# Patient Record
Sex: FEMALE | Race: ASIAN | NOT HISPANIC OR LATINO | Employment: FULL TIME | URBAN - METROPOLITAN AREA
[De-identification: names, ages, dates, MRNs, and addresses within clinical notes are randomized per-mention and may not be internally consistent; named-entity substitution may affect disease eponyms.]

---

## 2017-01-11 ENCOUNTER — OFFICE VISIT (OUTPATIENT)
Dept: MEDICAL GROUP | Facility: PHYSICIAN GROUP | Age: 57
End: 2017-01-11
Payer: COMMERCIAL

## 2017-01-11 VITALS
SYSTOLIC BLOOD PRESSURE: 122 MMHG | WEIGHT: 142 LBS | BODY MASS INDEX: 27.88 KG/M2 | RESPIRATION RATE: 12 BRPM | OXYGEN SATURATION: 97 % | HEART RATE: 98 BPM | DIASTOLIC BLOOD PRESSURE: 88 MMHG | HEIGHT: 60 IN | TEMPERATURE: 99.1 F

## 2017-01-11 DIAGNOSIS — H92.02 ACUTE PAIN OF LEFT EAR: ICD-10-CM

## 2017-01-11 DIAGNOSIS — S09.91XA LEFT EAR INJURY, INITIAL ENCOUNTER: Primary | ICD-10-CM

## 2017-01-11 PROCEDURE — 99213 OFFICE O/P EST LOW 20 MIN: CPT | Performed by: NURSE PRACTITIONER

## 2017-01-11 RX ORDER — MECLIZINE HYDROCHLORIDE 25 MG/1
25 TABLET ORAL 3 TIMES DAILY PRN
COMMUNITY
End: 2018-02-07

## 2017-01-11 NOTE — MR AVS SNAPSHOT
Urszula Persaud   2017 2:40 PM   Office Visit   MRN: [id number]    Department:  [facility]   Dept Phone:  [phone]    Description:  Female : 1960   Provider:  CASSIE Newberry           Reason for Visit     Otalgia x2 weeks patient was seen at [facility] and was given meclizine      Allergies as of 2017     No Known Allergies      You were diagnosed with     Left ear injury, initial encounter   [704759]  -  Primary     Acute pain of left ear   [5690792]         Vital Signs     Blood Pressure Pulse Temperature Respirations Height Weight    122/88 mmHg 98 37.3 °C (99.1 °F) 12 1.524 m (5') 64.411 kg (142 lb)    Body Mass Index Oxygen Saturation Smoking Status             27.73 kg/m2 97% Never Smoker          Basic Information     Date Of Birth Sex Race Ethnicity Preferred Language    1960 Female  Non- English      Problem List              ICD-10-CM Priority Class Noted - Resolved    HTN (hypertension) I10   2014 - Present    Foot pain M79.673   2014 - Present    Heel spur M77.30   9/3/2014 - Present    Urinary tract infection symptoms R39.9   2016 - Present    Shoulder blade pain M89.8X1   2016 - Present      Health Maintenance        Date Due Completion Dates    IMM DTaP/Tdap/Td Vaccine (1 - Tdap) 10/1/1980 ---    COLON CANCER SCREENING ANNUAL FIT 2016, 2015, 2014    IMM INFLUENZA (1) 2016 ---    MAMMOGRAM 2017, 8/3/2015, 8/3/2015, 2015, 2014 (Prv Comp)    Override on 2014: Previously completed (RDC)    PAP SMEAR 2018, 2011 (Prv Comp)    Override on 2011: Previously completed            Current Immunizations     No immunizations on file.      Below and/or attached are the medications your provider expects you to take. Review all of your home medications and newly ordered medications with your provider and/or pharmacist. Follow medication instructions as  directed by your provider and/or pharmacist. Please keep your medication list with you and share with your provider. Update the information when medications are discontinued, doses are changed, or new medications (including over-the-counter products) are added; and carry medication information at all times in the event of emergency situations     Allergies:  No Known Allergies          Medications  Valid as of: January 11, 2017 -  4:09 PM    Generic Name Brand Name Tablet Size Instructions for use    AmLODIPine Besylate (Tab) NORVASC 5 MG Take 1 Tab by mouth every day.        Clotrimazole-Betamethasone (Cream) LOTRISONE 1-0.05 % Apply sparingly bid for 7-10 days        Diclofenac Sodium (Gel) Diclofenac Sodium 1 % Apply 4 g to skin as directed 3 times a day as needed.        Meclizine HCl (Tab) ANTIVERT 25 MG Take 25 mg by mouth 3 times a day as needed.        .                 Medicines prescribed today were sent to:     [facility]/PHARMACY #[id number] - [place], [place] - [address]    [address] [place] [address]    Phone: [phone] Fax: [phone]    Open 24 Hours?: No      Medication refill instructions:       If your prescription bottle indicates you have medication refills left, it is not necessary to call your provider’s office. Please contact your pharmacy and they will refill your medication.    If your prescription bottle indicates you do not have any refills left, you may request refills at any time through one of the following ways: The online [employer] system (except Urgent Care), by calling your provider’s office, or by asking your pharmacy to contact your provider’s office with a refill request. Medication refills are processed only during regular business hours and may not be available until the next business day. Your provider may request additional information or to have a follow-up visit with you prior to refilling your medication.   *Please Note: Medication refills are assigned a new Rx number when  refilled electronically. Your pharmacy may indicate that no refills were authorized even though a new prescription for the same medication is available at the pharmacy. Please request the medicine by name with the pharmacy before contacting your provider for a refill.        Referral     A referral request has been sent to our patient care coordination department. Please allow 3-5 business days for us to process this request and contact you either by phone or mail. If you do not hear from us by the 5th business day, please call us at [phone].           ChannelMeter Access Code: [email or web address]  Expires: 2/10/2017  1:27 PM    ChannelMeter  A secure, online tool to manage your health information     [employer]’s ChannelMeter® is a secure, online tool that connects you to your personalized health information from the privacy of your home -- day or night - making it very easy for you to manage your healthcare. Once the activation process is completed, you can even access your medical information using the ChannelMeter nehemiah, which is available for free in the Apple Nehemiah store or Google Play store.     ChannelMeter provides the following levels of access (as shown below):   My Chart Features   [facility]  Specialists [facility]  Urgent  Care Non-[facility]  Primary Care  Doctor   Email your healthcare team securely and privately 24/7 X X X    Manage appointments: schedule your next appointment; view details of past/upcoming appointments X      Request prescription refills. X      View recent personal medical records, including lab and immunizations X X X X   View health record, including health history, allergies, medications X X X X   Read reports about your outpatient visits, procedures, consult and ER notes X X X X   See your discharge summary, which is a recap of your hospital and/or ER visit that includes your diagnosis, lab results, and care plan. X X       How to register for ChannelMeter:  1. Go to   [email or web address].  2. Click on the Sign Up Now box, which takes you to the New Member Sign Up page. You will need to provide the following information:  a. Enter your [employer] Access Code exactly as it appears at the top of this page. (You will not need to use this code after you’ve completed the sign-up process. If you do not sign up before the expiration date, you must request a new code.)   b. Enter your date of birth.   c. Enter your home email address.   d. Click Submit, and follow the next screen’s instructions.  3. Create a Kem[employer]arm ID. This will be your Kem[employer] login ID and cannot be changed, so think of one that is secure and easy to remember.  4. Create a Chef Surfingt password. You can change your password at any time.  5. Enter your Password Reset Question and Answer. This can be used at a later time if you forget your password.   6. Enter your e-mail address. This allows you to receive e-mail notifications when new information is available in Kem[employer].  7. Click Sign Up. You can now view your health information.    For assistance activating your [employer] account, call [phone]
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
## 2017-02-08 ENCOUNTER — OFFICE VISIT (OUTPATIENT)
Dept: MEDICAL GROUP | Facility: PHYSICIAN GROUP | Age: 57
End: 2017-02-08
Payer: COMMERCIAL

## 2017-02-08 VITALS
RESPIRATION RATE: 16 BRPM | OXYGEN SATURATION: 96 % | BODY MASS INDEX: 27.8 KG/M2 | HEIGHT: 60 IN | HEART RATE: 89 BPM | WEIGHT: 141.6 LBS | SYSTOLIC BLOOD PRESSURE: 112 MMHG | TEMPERATURE: 99.3 F | DIASTOLIC BLOOD PRESSURE: 68 MMHG

## 2017-02-08 DIAGNOSIS — L29.9 EAR ITCHING: ICD-10-CM

## 2017-02-08 PROCEDURE — 99213 OFFICE O/P EST LOW 20 MIN: CPT | Performed by: NURSE PRACTITIONER

## 2017-02-08 RX ORDER — TRIAMCINOLONE ACETONIDE 1 MG/G
CREAM TOPICAL
Qty: 1 TUBE | Refills: 1 | Status: SHIPPED | OUTPATIENT
Start: 2017-02-08 | End: 2018-02-07

## 2017-02-09 NOTE — PROGRESS NOTES
Subjective:     Chief Complaint   Patient presents with   • Other     Rt Ear problem        HPI:  Urszula Persaud is a 56 y.o. female here today to discuss the following:    Ear itching  Patient has a rash and itching on her right outer ear for 2 weeks. She has applied rubbing alcohol to relieve itching, but continues to irritate it.           Current medicines (including changes today)  Current Outpatient Prescriptions   Medication Sig Dispense Refill   • triamcinolone acetonide (KENALOG) 0.1 % Cream Apply to affected area twice daily until resolved 1 Tube 1   • meclizine (ANTIVERT) 25 MG Tab Take 25 mg by mouth 3 times a day as needed.     • amlodipine (NORVASC) 5 MG Tab Take 1 Tab by mouth every day. 90 Tab 3   • Diclofenac Sodium (VOLTAREN) 1 % Gel Apply 4 g to skin as directed 3 times a day as needed. 1 Tube 5     No current facility-administered medications for this visit.       She  has no past medical history on file.    ROS   Review of Systems   Constitutional: Negative for fever, chills, weight loss and malaise/fatigue.   HENT: Negative for ear pain, nosebleeds, congestion, sore throat and neck pain.  Positive Rash on right outer ear  Respiratory: Negative for cough, sputum production, shortness of breath and wheezing.    Cardiovascular: Negative for chest pain, palpitations,  and leg swelling.   Gastrointestinal: Negative for heartburn, nausea, vomiting, diarrhea and abdominal pain.   All other systems reviewed and are negative except as in HPI.     Objective:   Physical Exam:  Blood pressure 112/68, pulse 89, temperature 37.4 °C (99.3 °F), resp. rate 16, height 1.524 m (5'), weight 64.229 kg (141 lb 9.6 oz), SpO2 96 %. Body mass index is 27.65 kg/(m^2).   General:  Well nourished, well developed in NAD  Head is grossly normal. Right ear helix erythematous, dry scaly rash, no drainage  Neck: Supple without JVD   Pulmonary:  Normal effort.  Cardiovascular: Regular rate   Extremities: no clubbing, cyanosis,  or edema.   Assessment and Plan:   The following treatment plan was discussed   1. Ear itching  triamcinolone acetonide (KENALOG) 0.1 % Cream       Followup: Return if symptoms worsen or fail to improve, for 2 weeks if not resolved.     Please note that this dictation was created using voice recognition software. I have made every reasonable attempt to correct obvious errors, but I expect that there are errors of grammar and possibly content that I did not discover before finalizing the note.

## 2017-04-24 ENCOUNTER — OFFICE VISIT (OUTPATIENT)
Dept: MEDICAL GROUP | Facility: PHYSICIAN GROUP | Age: 57
End: 2017-04-24
Payer: COMMERCIAL

## 2017-04-24 VITALS
OXYGEN SATURATION: 98 % | BODY MASS INDEX: 28.31 KG/M2 | TEMPERATURE: 99.3 F | HEIGHT: 60 IN | RESPIRATION RATE: 16 BRPM | WEIGHT: 144.2 LBS | DIASTOLIC BLOOD PRESSURE: 80 MMHG | SYSTOLIC BLOOD PRESSURE: 116 MMHG | HEART RATE: 89 BPM

## 2017-04-24 DIAGNOSIS — Z12.39 BREAST CANCER SCREENING: ICD-10-CM

## 2017-04-24 DIAGNOSIS — L50.9 URTICARIA: ICD-10-CM

## 2017-04-24 PROCEDURE — 99214 OFFICE O/P EST MOD 30 MIN: CPT | Performed by: NURSE PRACTITIONER

## 2017-04-24 RX ORDER — TRIAMCINOLONE ACETONIDE 40 MG/ML
40 INJECTION, SUSPENSION INTRA-ARTICULAR; INTRAMUSCULAR ONCE
Status: COMPLETED | OUTPATIENT
Start: 2017-04-24 | End: 2017-04-24

## 2017-04-24 RX ADMIN — TRIAMCINOLONE ACETONIDE 40 MG: 40 INJECTION, SUSPENSION INTRA-ARTICULAR; INTRAMUSCULAR at 11:37

## 2017-04-24 NOTE — ASSESSMENT & PLAN NOTE
Started 1 week ago with redness and itching around her right nipple.  She then had itching all over, but no obvious rash.  She has been using essential oils which have been working very well for her.  Discussed plan.

## 2017-04-24 NOTE — MR AVS SNAPSHOT
Urszula Brittben   2017 11:20 AM   Office Visit   MRN: [id number]    Department:  [facility]   Dept Phone:  [phone]    Description:  Female : 1960   Provider:  CASSIE Echavarria           Reason for Visit     Itchy           Allergies as of 2017     No Known Allergies      You were diagnosed with     Breast cancer screening   [920858]       Urticaria   [[id number]]         Vital Signs     Blood Pressure Pulse Temperature Respirations Height Weight    116/80 mmHg 89 37.4 °C (99.3 °F) 16 1.524 m (5') 65.409 kg (144 lb 3.2 oz)    Body Mass Index Oxygen Saturation Smoking Status             28.16 kg/m2 98% Never Smoker          Basic Information     Date Of Birth Sex Race Ethnicity Preferred Language    1960 Female  Non- English      Your appointments     May 02, 2017 11:50 AM   MA SCRN10 with [facility] MG 1   [facility] BREAST [facility] ([address])    [address]  [address]  [facility] [id number] [phone]           No deodorant, powder, perfume or lotion under the arm or breast area.            May 17, 2017  2:00 PM   ANNUAL EXAM PREVENTATIVE with Makeda Jaquez M.D.   [address] [address] [phone]              Problem List              ICD-10-CM Priority Class Noted - Resolved    HTN (hypertension) I10   2014 - Present    Foot pain M79.673   2014 - Present    Heel spur M77.30   9/3/2014 - Present    Urinary tract infection symptoms R39.9   2016 - Present    Shoulder blade pain M89.8X1   2016 - Present    Ear itching L29.9   2017 - Present    Urticaria L50.9   2017 - Present    Breast cancer screening Z12.39   2017 - Present      Health Maintenance        Date Due Completion Dates    IMM DTaP/Tdap/Td Vaccine (1 - Tdap) 10/1/1980 ---    COLON CANCER SCREENING ANNUAL FIT 2016, 2015, 2014    MAMMOGRAM 2017,  7/31/2015, 5/30/2014 (Prv Comp)    Override on 5/30/2014: Previously completed (RDC)    PAP SMEAR 8/18/2018 8/18/2015, 7/30/2011 (Prv Comp)    Override on 7/30/2011: Previously completed            Current Immunizations     No immunizations on file.      Below and/or attached are the medications your provider expects you to take. Review all of your home medications and newly ordered medications with your provider and/or pharmacist. Follow medication instructions as directed by your provider and/or pharmacist. Please keep your medication list with you and share with your provider. Update the information when medications are discontinued, doses are changed, or new medications (including over-the-counter products) are added; and carry medication information at all times in the event of emergency situations     Allergies:  No Known Allergies          Medications  Valid as of: April 24, 2017 -  1:01 PM    Generic Name Brand Name Tablet Size Instructions for use    AmLODIPine Besylate (Tab) NORVASC 5 MG Take 1 Tab by mouth every day.        Diclofenac Sodium (Gel) Diclofenac Sodium 1 % Apply 4 g to skin as directed 3 times a day as needed.        Meclizine HCl (Tab) ANTIVERT 25 MG Take 25 mg by mouth 3 times a day as needed.        Triamcinolone Acetonide (Cream) KENALOG 0.1 % Apply to affected area twice daily until resolved        .                 Medicines prescribed today were sent to:     [facility]/PHARMACY #[id number] - [place], [place] [address]    [address] [place] [address]    Phone: [phone] Fax: [phone]    Open 24 Hours?: No      Medication refill instructions:       If your prescription bottle indicates you have medication refills left, it is not necessary to call your provider’s office. Please contact your pharmacy and they will refill your medication.    If your prescription bottle indicates you do not have any refills left, you may request refills at any time through one of the following ways: The  online [email or web address].com system (except Urgent Care), by calling your provider’s office, or by asking your pharmacy to contact your provider’s office with a refill request. Medication refills are processed only during regular business hours and may not be available until the next business day. Your provider may request additional information or to have a follow-up visit with you prior to refilling your medication.   *Please Note: Medication refills are assigned a new Rx number when refilled electronically. Your pharmacy may indicate that no refills were authorized even though a new prescription for the same medication is available at the pharmacy. Please request the medicine by name with the pharmacy before contacting your provider for a refill.        Your To Do List     Future Labs/Procedures Complete By Expires    MA-SCREEN MAMMO W/CAD-BILAT  As directed 4/24/2018         [email or web address] Access Code: [email or web address]  Expires: 5/24/2017  1:01 PM    Matrimony.com  A secure, online tool to manage your health information     [employer]’s Matrimony.com® is a secure, online tool that connects you to your personalized health information from the privacy of your home -- day or night - making it very easy for you to manage your healthcare. Once the activation process is completed, you can even access your medical information using the Matrimony.com nehemiah, which is available for free in the Apple Nehemiah store or Google Play store.     Matrimony.com provides the following levels of access (as shown below):   My Chart Features   [facility]  Specialists [facility]  Urgent  Care [facility]  Primary Care  Doctor   Email your healthcare team securely and privately 24/7 X X X    Manage appointments: schedule your next appointment; view details of past/upcoming appointments X      Request prescription refills. X      View recent personal medical records, including lab and immunizations X X X X   View health record, including health history, allergies,  medications X X X X   Read reports about your outpatient visits, procedures, consult and ER notes X X X X   See your discharge summary, which is a recap of your hospital and/or ER visit that includes your diagnosis, lab results, and care plan. X X       How to register for [employer]:  1. Go to  [email or web address].  2. Click on the Sign Up Now box, which takes you to the New Member Sign Up page. You will need to provide the following information:  a. Enter your Britestream [employer] Access Code exactly as it appears at the top of this page. (You will not need to use this code after you’ve completed the sign-up process. If you do not sign up before the expiration date, you must request a new code.)   b. Enter your date of birth.   c. Enter your home email address.   d. Click Submit, and follow the next screen’s instructions.  3. Create a Britestream Networks ID. This will be your Brit[employer] Networks login ID and cannot be changed, so think of one that is secure and easy to remember.  4. Create a [employer] password. You can change your password at any time.  5. Enter your Password Reset Question and Answer. This can be used at a later time if you forget your password.   6. Enter your e-mail address. This allows you to receive e-mail notifications when new information is available in Brit[employer]ream Networks.  7. Click Sign Up. You can now view your health information.    For assistance activating your Brit[employer]ream Networks account, call [phone]

## 2017-04-24 NOTE — PROGRESS NOTES
Chief Complaint   Patient presents with   • Itchy       HISTORY OF PRESENT ILLNESS: Patient is a 56 y.o. female established patient who presents today to discuss the following issues:    Urticaria  Started 1 week ago with redness and itching around her right nipple.  She then had itching all over, but no obvious rash.  She has been using essential oils which have been working very well for her.  Discussed plan.    Breast cancer screening  Is due for screening mammogram.      Patient Active Problem List    Diagnosis Date Noted   • Urticaria 2017   • Breast cancer screening 2017   • Ear itching 2017   • Urinary tract infection symptoms 2016   • Shoulder blade pain 2016   • Heel spur 2014   • HTN (hypertension) 2014   • Foot pain 2014       Allergies:Review of patient's allergies indicates no known allergies.    Current Outpatient Prescriptions   Medication Sig Dispense Refill   • triamcinolone acetonide (KENALOG) 0.1 % Cream Apply to affected area twice daily until resolved 1 Tube 1   • meclizine (ANTIVERT) 25 MG Tab Take 25 mg by mouth 3 times a day as needed.     • amlodipine (NORVASC) 5 MG Tab Take 1 Tab by mouth every day. 90 Tab 3   • Diclofenac Sodium (VOLTAREN) 1 % Gel Apply 4 g to skin as directed 3 times a day as needed. 1 Tube 5     Current Facility-Administered Medications   Medication Dose Route Frequency Provider Last Rate Last Dose   • triamcinolone acetonide (KENALOG-40) injection 40 mg  40 mg Intramuscular Once Gigi Lutz, A.P.N.           Social History   Substance Use Topics   • Smoking status: Never Smoker    • Smokeless tobacco: Never Used   • Alcohol Use: No       Family Status   Relation Status Death Age   • Mother     • Father       Family History   Problem Relation Age of Onset   • Diabetes Neg Hx    • Cancer Neg Hx        Review of Systems:   Constitutional: Negative for fever, chills, weight loss and malaise/fatigue.   HENT:  Negative for ear pain, nosebleeds, congestion, sore throat and neck pain.    Eyes: Negative for blurred vision.   Respiratory: Negative for cough, sputum production, shortness of breath and wheezing.    Cardiovascular: Negative for chest pain, palpitations, orthopnea and leg swelling.   Gastrointestinal: Negative for heartburn, nausea, vomiting and abdominal pain.   Genitourinary: Negative for dysuria, urgency and frequency.   Musculoskeletal: Negative for myalgias, joint pain, and back pain.  Skin: Positive for itching all over and redness and itching on right breast.  Neurological: Negative for dizziness, tingling, tremors, sensory change, focal weakness and headaches.   Endo/Heme/Allergies: Does not bruise/bleed easily.   Psychiatric/Behavioral: Negative for depression, suicidal ideas and memory loss.  The patient is not nervous/anxious and does not have insomnia.    All other systems reviewed and are negative except as in HPI.    Exam:  Blood pressure 116/80, pulse 89, temperature 37.4 °C (99.3 °F), resp. rate 16, height 1.524 m (5'), weight 65.409 kg (144 lb 3.2 oz), SpO2 98 %.  General:  Well nourished, well developed female in NAD  Head: Grossly normal.  Neck: Supple without JVD or bruit. Thyroid is not enlarged.  Pulmonary: Clear to ausculation. Normal effort. No rales, ronchi, or wheezing.  Cardiovascular: Regular rate and rhythm without murmur.   Extremities: No clubbing, cyanosis, or edema.  Skin: Intact with no obvious rashes.  Positive for erythema and mild excoriation around right nipple.  Neuro: Grossly intact.  Psych: Alert and oriented x 3.  Mood and affect appropriate.    Medical decision-making and discussion: Urszula is here today to discuss itching.  She was given a Kenalog shot, and she was encouraged to use otc meds such as benadryl and hydrocortisone cream along with her essential oils.  A screening mammogram was also ordered.  She was encouraged to follow-up if she is wprse/no  better.          Assessment/Plan:  1. Breast cancer screening  MA-SCREEN MAMMO W/CAD-BILAT   2. Urticaria  triamcinolone acetonide (KENALOG-40) injection 40 mg       Return if symptoms worsen or fail to improve.    Please note that this dictation was created using voice recognition software. I have made every reasonable attempt to correct obvious errors, but I expect that there are errors of grammar and possibly content that I did not discover before finalizing the note.

## 2017-05-02 ENCOUNTER — HOSPITAL ENCOUNTER (OUTPATIENT)
Dept: RADIOLOGY | Facility: MEDICAL CENTER | Age: 57
End: 2017-05-02
Attending: NURSE PRACTITIONER
Payer: COMMERCIAL

## 2017-05-02 DIAGNOSIS — Z12.39 BREAST CANCER SCREENING: ICD-10-CM

## 2017-05-02 PROCEDURE — G0202 SCR MAMMO BI INCL CAD: HCPCS

## 2017-05-03 ENCOUNTER — TELEPHONE (OUTPATIENT)
Dept: MEDICAL GROUP | Facility: PHYSICIAN GROUP | Age: 57
End: 2017-05-03

## 2017-05-03 NOTE — TELEPHONE ENCOUNTER
----- Message from CASSIE Echavarria sent at 5/2/2017  6:12 PM PDT -----  Please call patient and let her know that her mammogram was normal.  She should plan to have another one in about a year.     Thank you!  Gigi

## 2017-05-31 ENCOUNTER — OFFICE VISIT (OUTPATIENT)
Dept: MEDICAL GROUP | Facility: PHYSICIAN GROUP | Age: 57
End: 2017-05-31
Payer: COMMERCIAL

## 2017-05-31 VITALS
DIASTOLIC BLOOD PRESSURE: 90 MMHG | RESPIRATION RATE: 20 BRPM | TEMPERATURE: 99.5 F | SYSTOLIC BLOOD PRESSURE: 140 MMHG | BODY MASS INDEX: 29.06 KG/M2 | HEIGHT: 60 IN | HEART RATE: 100 BPM | WEIGHT: 148 LBS | OXYGEN SATURATION: 98 %

## 2017-05-31 DIAGNOSIS — Z00.00 WELL ADULT EXAM: ICD-10-CM

## 2017-05-31 DIAGNOSIS — I10 ESSENTIAL HYPERTENSION: ICD-10-CM

## 2017-05-31 DIAGNOSIS — Z13.29 SCREENING FOR ENDOCRINE DISORDER: ICD-10-CM

## 2017-05-31 DIAGNOSIS — Z12.11 SCREENING FOR COLON CANCER: ICD-10-CM

## 2017-05-31 DIAGNOSIS — N76.0 VULVOVAGINITIS: ICD-10-CM

## 2017-05-31 DIAGNOSIS — Z13.6 SCREENING FOR CARDIOVASCULAR CONDITION: ICD-10-CM

## 2017-05-31 PROBLEM — L50.9 URTICARIA: Status: RESOLVED | Noted: 2017-04-24 | Resolved: 2017-05-31

## 2017-05-31 PROBLEM — L29.9 EAR ITCHING: Status: RESOLVED | Noted: 2017-02-08 | Resolved: 2017-05-31

## 2017-05-31 PROCEDURE — 99396 PREV VISIT EST AGE 40-64: CPT | Performed by: FAMILY MEDICINE

## 2017-05-31 RX ORDER — AMLODIPINE BESYLATE 5 MG/1
5 TABLET ORAL DAILY
Qty: 90 TAB | Refills: 3 | Status: SHIPPED | OUTPATIENT
Start: 2017-05-31 | End: 2017-05-31 | Stop reason: SDUPTHER

## 2017-05-31 RX ORDER — AMLODIPINE BESYLATE 5 MG/1
5 TABLET ORAL DAILY
Qty: 90 TAB | Refills: 3 | Status: SHIPPED | OUTPATIENT
Start: 2017-05-31 | End: 2018-02-01 | Stop reason: SDUPTHER

## 2017-05-31 NOTE — PROGRESS NOTES
Chief Complaint   Patient presents with   • Annual Exam       HISTORY OF PRESENT ILLNESS: Patient is a 56 y.o. Female est  patient who presents today to discuss the following issues:    1. Well adult exam  This is a post-menopausal 56 year old female here today for annual preventive visit.  Due for labs.  Mammo is up to date.  Due for colon cancer screening, declines colonoscopy.     2. Screening for colon cancer  3. Screening for cardiovascular condition  4. Screening for endocrine disorder  5. Vulvovaginitis  Reports that she has been having vaginal itching. Tried some hydrocortisone.      6. Essential hypertension  Continues on the amlodipine, but needs refill.  No CP, SOB, leg swelling.       Active Ambulatory Problems     Diagnosis Date Noted   • HTN (hypertension) 07/30/2014   • Foot pain 07/30/2014   • Heel spur 09/03/2014   • Breast cancer screening 04/24/2017     Resolved Ambulatory Problems     Diagnosis Date Noted   • Urinary tract infection symptoms 09/14/2016   • Shoulder blade pain 09/14/2016   • Ear itching 02/08/2017   • Urticaria 04/24/2017     No Additional Past Medical History       Allergies:Review of patient's allergies indicates no known allergies.    Current Outpatient Prescriptions   Medication Sig Dispense Refill   • nystatin/triamcinolone (MYCOLOG) 085896-2.1 UNIT/GM-% Cream Apply nightly for 3 nights. 30 g 0   • amlodipine (NORVASC) 5 MG Tab Take 1 Tab by mouth every day. 90 Tab 3   • triamcinolone acetonide (KENALOG) 0.1 % Cream Apply to affected area twice daily until resolved 1 Tube 1   • Diclofenac Sodium (VOLTAREN) 1 % Gel Apply 4 g to skin as directed 3 times a day as needed. 1 Tube 5   • meclizine (ANTIVERT) 25 MG Tab Take 25 mg by mouth 3 times a day as needed.       No current facility-administered medications for this visit.       Social History   Substance Use Topics   • Smoking status: Never Smoker    • Smokeless tobacco: Never Used   • Alcohol Use: No       Family Status    Relation Status Death Age   • Mother     • Father       Family History   Problem Relation Age of Onset   • Diabetes Neg Hx    • Cancer Neg Hx      Health Maintenance Due   Topic Date Due   • IMM DTaP/Tdap/Td Vaccine (1 - Tdap) 10/01/1980   • COLON CANCER SCREENING ANNUAL FIT  2016       ROS:  Review of Systems   Constitutional: Negative for fever, chills, weight loss and malaise/fatigue.   HENT: Negative for ear pain, nosebleeds, congestion, sore throat and neck pain.    Eyes: Negative for blurred vision.   Respiratory: Negative for cough, sputum production, shortness of breath and wheezing.    Cardiovascular: Negative for chest pain, palpitations, orthopnea and leg swelling.   Gastrointestinal: Negative for heartburn, nausea, vomiting and abdominal pain.   Genitourinary: Negative for dysuria, urgency and frequency.   Musculoskeletal: Negative for myalgias, back pain and joint pain.   Skin: Negative for rash and itching.   Neurological: Negative for dizziness, tingling, tremors, sensory change, focal weakness and headaches.   Endo/Heme/Allergies: Does not bruise/bleed easily.   Psychiatric/Behavioral: Negative for depression, suicidal ideas and memory loss.  The patient is not nervous/anxious and does not have insomnia.      Exam:  Blood pressure 140/90, pulse 100, temperature 37.5 °C (99.5 °F), resp. rate 20, height 1.524 m (5'), weight 67.132 kg (148 lb), SpO2 98 %.  General:  Well nourished, well developed female in NAD  HEENT: Normocephalic and atraumatic. TMs clear bilaterally. Oropharynx is clear      without erythema and no tonsillar exudate. Nasal mucosa pink with minimal      Rhinorrhea.  EYES: EOMI.  Conjunctiva clear.    Neck: Supple without JVD or bruit. Thyroid is not enlarged.  Pulmonary: Clear to ausculation and percussion.  Normal effort. No rales, ronchi, or wheezing.  Cardiovascular: Regular rate and rhythm without murmur, no peripheral edema  Abdomen: positive bowel sounds.   Non tender, non distended, no hepatosplenomegaly.   MSK: normal ROM in upper and lower extremities bilaterally  Psych: appropriate affect and concentration, oriented to person and place  Neuro: no unilateral weakness in upper or lower extremities.  No gait disturbance    Please note that this dictation was created using voice recognition software. I have made every reasonable attempt to correct obvious errors, but I expect that there are errors of grammar and possibly content that I did not discover before finalizing the note.    Assessment/Plan:  1. Well adult exam  Continue healthy lifestyle.     2. Screening for colon cancer    - OCCULT BLOOD, FECAL IMMUNOASSAY    3. Screening for cardiovascular condition    - LIPID PROFILE; Future  - COMP METABOLIC PANEL; Future    4. Screening for endocrine disorder    - COMP METABOLIC PANEL; Future  - VITAMIN D,25 HYDROXY; Future    5. Vulvovaginitis    - nystatin/triamcinolone (MYCOLOG) 514118-7.1 UNIT/GM-% Cream; Apply nightly for 3 nights.  Dispense: 30 g; Refill: 0    6. Essential hypertension    - amlodipine (NORVASC) 5 MG Tab; Take 1 Tab by mouth every day.  Dispense: 90 Tab; Refill: 3      Annual follow up

## 2017-05-31 NOTE — MR AVS SNAPSHOT
Urszula Persaud   2017 11:20 AM   Office Visit   MRN: [id number]    Department:  [facility]   Dept Phone:  [phone]    Description:  Female : 1960   Provider:  Makeda Jaquez M.D.           Reason for Visit     Annual Exam           Allergies as of 2017     No Known Allergies      You were diagnosed with     Well adult exam   [328489]       Screening for colon cancer   [695535]       Screening for cardiovascular condition   [284833]       Screening for endocrine disorder   [6439650]       Vulvovaginitis   [074089]       Essential hypertension   [7811986]         Vital Signs     Blood Pressure Pulse Temperature Respirations Height Weight    140/90 mmHg 100 37.5 °C (99.5 °F) 20 1.524 m (5') 67.132 kg (148 lb)    Body Mass Index Oxygen Saturation Smoking Status             28.90 kg/m2 98% Never Smoker          Basic Information     Date Of Birth Sex Race Ethnicity Preferred Language    1960 Female  Non- English      Problem List              ICD-10-CM Priority Class Noted - Resolved    HTN (hypertension) I10   2014 - Present    Foot pain M79.673   2014 - Present    Heel spur M77.30   9/3/2014 - Present    Breast cancer screening Z12.39   2017 - Present      Health Maintenance        Date Due Completion Dates    IMM DTaP/Tdap/Td Vaccine (1 - Tdap) 10/1/1980 ---    COLON CANCER SCREENING ANNUAL FIT 2016, 2015, 2014    MAMMOGRAM 2018, 2016, 2015, 2014 (Prv Comp)    Override on 2014: Previously completed (RDC)    PAP SMEAR 2018, 2011 (Prv Comp)    Override on 2011: Previously completed            Current Immunizations     No immunizations on file.      Below and/or attached are the medications your provider expects you to take. Review all of your home medications and newly ordered medications with your provider and/or pharmacist. Follow medication instructions as directed  by your provider and/or pharmacist. Please keep your medication list with you and share with your provider. Update the information when medications are discontinued, doses are changed, or new medications (including over-the-counter products) are added; and carry medication information at all times in the event of emergency situations     Allergies:  No Known Allergies          Medications  Valid as of: May 31, 2017 -  3:24 PM    Generic Name Brand Name Tablet Size Instructions for use    AmLODIPine Besylate (Tab) NORVASC 5 MG Take 1 Tab by mouth every day.        Diclofenac Sodium (Gel) Diclofenac Sodium 1 % Apply 4 g to skin as directed 3 times a day as needed.        Meclizine HCl (Tab) ANTIVERT 25 MG Take 25 mg by mouth 3 times a day as needed.        Nystatin-Triamcinolone (Cream) MYCOLOG 973354-0.1 UNIT/GM-% Apply nightly for 3 nights.        Triamcinolone Acetonide (Cream) KENALOG 0.1 % Apply to affected area twice daily until resolved        .                 Medicines prescribed today were sent to:     [facility][id number] - [place] - [address] [facility].    [address] [place] [address]    Phone: [phone] Fax: [phone]    Open 24 Hours?: No    [id number]  [place] - [address] [facility] [address]    [address] [facility] [address]    Phone: [phone] Fax: [phone]    Open 24 Hours?: No      Medication refill instructions:       If your prescription bottle indicates you have medication refills left, it is not necessary to call your provider’s office. Please contact your pharmacy and they will refill your medication.    If your prescription bottle indicates you do not have any refills left, you may request refills at any time through one of the following ways: The online [employer] system (except Urgent Care), by calling your provider’s office, or by asking your pharmacy to contact your provider’s office with a refill request. Medication refills are processed  only during regular business hours and may not be available until the next business day. Your provider may request additional information or to have a follow-up visit with you prior to refilling your medication.   *Please Note: Medication refills are assigned a new Rx number when refilled electronically. Your pharmacy may indicate that no refills were authorized even though a new prescription for the same medication is available at the pharmacy. Please request the medicine by name with the pharmacy before contacting your provider for a refill.        Your To Do List     Future Labs/Procedures Complete By Expires    COMP METABOLIC PANEL  As directed 5/31/2018    LIPID PROFILE  As directed 5/31/2018    VITAMIN D,25 HYDROXY  As directed 5/31/2018      Referral     A referral request has been sent to our patient care coordination department. Please allow 3-5 business days for us to process this request and contact you either by phone or mail. If you do not hear from us by the 5th business day, please call us at [phone].           Fed[employer]ber Access Code: [email or web address]  Expires: 6/30/2017 11:11 AM    FedCyber  A secure, online tool to manage your health information     [employer]’s FedCyber® is a secure, online tool that connects you to your personalized health information from the privacy of your home -- day or night - making it very easy for you to manage your healthcare. Once the activation process is completed, you can even access your medical information using the FedCyber nehemiah, which is available for free in the [employer] Nehemiah store or Google Play store.     FedCyber provides the following levels of access (as shown below):   My Chart Features   [facility] Doctor [facility]  Specialists [facility]  Urgent  Care Non-[facility]  Primary Care  Doctor   Email your healthcare team securely and privately 24/7 X X X    Manage appointments: schedule your next appointment; view details of past/upcoming appointments X       Request prescription refills. X      View recent personal medical records, including lab and immunizations X X X X   View health record, including health history, allergies, medications X X X X   Read reports about your outpatient visits, procedures, consult and ER notes X X X X   See your discharge summary, which is a recap of your hospital and/or ER visit that includes your diagnosis, lab results, and care plan. X X       How to register for Seeq:  1. Go to  [email or web address].  2. Click on the Sign Up Now box, which takes you to the New Member Sign Up page. You will need to provide the following information:  a. Enter your Seeq Access Code exactly as it appears at the top of this page. (You will not need to use this code after you’ve completed the sign-up process. If you do not sign up before the expiration date, you must request a new code.)   b. Enter your date of birth.   c. Enter your home email address.   d. Click Submit, and follow the next screen’s instructions.  3. Create a Seeq ID. This will be your Seeq login ID and cannot be changed, so think of one that is secure and easy to remember.  4. Create a Seeq password. You can change your password at any time.  5. Enter your Password Reset Question and Answer. This can be used at a later time if you forget your password.   6. Enter your e-mail address. This allows you to receive e-mail notifications when new information is available in Seeq.  7. Click Sign Up. You can now view your health information.    For assistance activating your Seeq account, call [phone]

## 2017-05-31 NOTE — TELEPHONE ENCOUNTER
Was the patient seen in the last year in this department? Yes  today    Does patient have an active prescription for medications requested? Yes pharmacy change    Received Request Via: Patient

## 2017-05-31 NOTE — TELEPHONE ENCOUNTER
Patient requests change in pharmacy.    [id number] - [place], [place] - [address] [address]  Phone: [phone] Fax: [phone]

## 2017-06-01 ENCOUNTER — HOSPITAL ENCOUNTER (OUTPATIENT)
Dept: LAB | Facility: MEDICAL CENTER | Age: 57
End: 2017-06-01
Attending: FAMILY MEDICINE
Payer: COMMERCIAL

## 2017-06-01 ENCOUNTER — HOSPITAL ENCOUNTER (OUTPATIENT)
Facility: MEDICAL CENTER | Age: 57
End: 2017-06-01
Attending: FAMILY MEDICINE
Payer: COMMERCIAL

## 2017-06-01 DIAGNOSIS — Z13.29 SCREENING FOR ENDOCRINE DISORDER: ICD-10-CM

## 2017-06-01 DIAGNOSIS — Z13.6 SCREENING FOR CARDIOVASCULAR CONDITION: ICD-10-CM

## 2017-06-01 LAB
25(OH)D3 SERPL-MCNC: 21 NG/ML (ref 30–100)
ALBUMIN SERPL BCP-MCNC: 4.6 G/DL (ref 3.2–4.9)
ALBUMIN/GLOB SERPL: 1.6 G/DL
ALP SERPL-CCNC: 71 U/L (ref 30–99)
ALT SERPL-CCNC: 20 U/L (ref 2–50)
ANION GAP SERPL CALC-SCNC: 7 MMOL/L (ref 0–11.9)
AST SERPL-CCNC: 15 U/L (ref 12–45)
BILIRUB SERPL-MCNC: 1.1 MG/DL (ref 0.1–1.5)
BUN SERPL-MCNC: 15 MG/DL (ref 8–22)
CALCIUM SERPL-MCNC: 9.8 MG/DL (ref 8.5–10.5)
CHLORIDE SERPL-SCNC: 100 MMOL/L (ref 96–112)
CHOLEST SERPL-MCNC: 224 MG/DL (ref 100–199)
CO2 SERPL-SCNC: 29 MMOL/L (ref 20–33)
CREAT SERPL-MCNC: 0.66 MG/DL (ref 0.5–1.4)
GFR SERPL CREATININE-BSD FRML MDRD: >60 ML/MIN/1.73 M 2
GLOBULIN SER CALC-MCNC: 2.9 G/DL (ref 1.9–3.5)
GLUCOSE SERPL-MCNC: 92 MG/DL (ref 65–99)
HDLC SERPL-MCNC: 76 MG/DL
LDLC SERPL CALC-MCNC: 125 MG/DL
POTASSIUM SERPL-SCNC: 3.9 MMOL/L (ref 3.6–5.5)
PROT SERPL-MCNC: 7.5 G/DL (ref 6–8.2)
SODIUM SERPL-SCNC: 136 MMOL/L (ref 135–145)
TRIGL SERPL-MCNC: 114 MG/DL (ref 0–149)

## 2017-06-01 PROCEDURE — 80061 LIPID PANEL: CPT

## 2017-06-01 PROCEDURE — 80053 COMPREHEN METABOLIC PANEL: CPT

## 2017-06-01 PROCEDURE — 82306 VITAMIN D 25 HYDROXY: CPT

## 2017-06-01 PROCEDURE — 82274 ASSAY TEST FOR BLOOD FECAL: CPT

## 2017-06-01 PROCEDURE — 36415 COLL VENOUS BLD VENIPUNCTURE: CPT

## 2017-06-03 LAB — HEMOCCULT STL QL IA: NEGATIVE

## 2017-06-05 ENCOUNTER — TELEPHONE (OUTPATIENT)
Dept: MEDICAL GROUP | Facility: PHYSICIAN GROUP | Age: 57
End: 2017-06-05

## 2017-06-05 NOTE — TELEPHONE ENCOUNTER
----- Message from CASSIE Newberry sent at 6/5/2017  6:47 AM PDT -----  Your FIT test was negative which indicates with 90% accuracy that there is no blood in the colon.  You can put off a colonoscopy for another year in light of these results.

## 2017-06-07 ENCOUNTER — TELEPHONE (OUTPATIENT)
Dept: MEDICAL GROUP | Facility: PHYSICIAN GROUP | Age: 57
End: 2017-06-07

## 2017-06-07 NOTE — TELEPHONE ENCOUNTER
----- Message from Makeda Jaquez M.D. sent at 6/7/2017  7:58 AM PDT -----  Labs are all acceptable except for Vitamin D, which is a little low.  Recommend 5000 IU per day of OTC vitamin D 3.

## 2018-02-01 ENCOUNTER — OFFICE VISIT (OUTPATIENT)
Dept: MEDICAL GROUP | Facility: PHYSICIAN GROUP | Age: 58
End: 2018-02-01
Payer: COMMERCIAL

## 2018-02-01 VITALS
WEIGHT: 144 LBS | HEART RATE: 76 BPM | OXYGEN SATURATION: 96 % | BODY MASS INDEX: 27.19 KG/M2 | RESPIRATION RATE: 16 BRPM | DIASTOLIC BLOOD PRESSURE: 80 MMHG | HEIGHT: 61 IN | SYSTOLIC BLOOD PRESSURE: 132 MMHG | TEMPERATURE: 98.8 F

## 2018-02-01 DIAGNOSIS — I10 ESSENTIAL HYPERTENSION: ICD-10-CM

## 2018-02-01 DIAGNOSIS — Z12.39 SCREENING FOR BREAST CANCER: ICD-10-CM

## 2018-02-01 DIAGNOSIS — M79.671 FOOT PAIN, RIGHT: ICD-10-CM

## 2018-02-01 DIAGNOSIS — Z12.11 SCREENING FOR COLON CANCER: ICD-10-CM

## 2018-02-01 DIAGNOSIS — Z83.3 FAMILY HISTORY OF DIABETES MELLITUS: ICD-10-CM

## 2018-02-01 DIAGNOSIS — Z00.00 WELL ADULT EXAM: ICD-10-CM

## 2018-02-01 DIAGNOSIS — Z13.6 SCREENING FOR CARDIOVASCULAR CONDITION: ICD-10-CM

## 2018-02-01 PROCEDURE — 99396 PREV VISIT EST AGE 40-64: CPT | Performed by: FAMILY MEDICINE

## 2018-02-01 RX ORDER — AMLODIPINE BESYLATE 5 MG/1
5 TABLET ORAL DAILY
Qty: 90 TAB | Refills: 3 | Status: ON HOLD | OUTPATIENT
Start: 2018-02-01 | End: 2018-02-16

## 2018-02-01 ASSESSMENT — PATIENT HEALTH QUESTIONNAIRE - PHQ9: CLINICAL INTERPRETATION OF PHQ2 SCORE: 0

## 2018-02-01 NOTE — PROGRESS NOTES
Chief Complaint   Patient presents with   • Annual Exam       HISTORY OF PRESENT ILLNESS: Patient is a 57 y.o. female new patient who presents today to discuss the following issues:    1. Well adult exam  Here today for annual preventive exam.  She is doing quite well needs refills on her chronic medications.   Due for pap, mammo, colon cancer screening and labs this summer.  No new symptoms        Active Ambulatory Problems     Diagnosis Date Noted   • HTN (hypertension) 2014   • Foot pain 2014   • Heel spur 2014   • Breast cancer screening 2017     Resolved Ambulatory Problems     Diagnosis Date Noted   • Urinary tract infection symptoms 2016   • Shoulder blade pain 2016   • Ear itching 2017   • Urticaria 2017     No Additional Past Medical History       Allergies:Patient has no known allergies.    Current Outpatient Prescriptions   Medication Sig Dispense Refill   • Diclofenac Sodium (VOLTAREN) 1 % Gel Apply 4 g to skin as directed 3 times a day as needed. 1 Tube 5   • amLODIPine (NORVASC) 5 MG Tab Take 1 Tab by mouth every day. 90 Tab 3   • nystatin/triamcinolone (MYCOLOG) 964494-8.1 UNIT/GM-% Cream Apply nightly for 3 nights. 30 g 0   • meclizine (ANTIVERT) 25 MG Tab Take 25 mg by mouth 3 times a day as needed.     • triamcinolone acetonide (KENALOG) 0.1 % Cream Apply to affected area twice daily until resolved 1 Tube 1     No current facility-administered medications for this visit.        Social History   Substance Use Topics   • Smoking status: Never Smoker   • Smokeless tobacco: Never Used   • Alcohol use No       Family Status   Relation Status   • Mother    • Father    • Neg Hx      Family History   Problem Relation Age of Onset   • Diabetes Neg Hx    • Cancer Neg Hx      Health Maintenance Due   Topic Date Due   • IMM DTaP/Tdap/Td Vaccine (1 - Tdap) 10/01/1980       ROS:  Review of Systems   Constitutional: Negative for fever, chills, weight  "loss and malaise/fatigue.   HENT: Negative for ear pain, nosebleeds, congestion, sore throat and neck pain.    Eyes: Negative for blurred vision.   Respiratory: Negative for cough, sputum production, shortness of breath and wheezing.    Cardiovascular: Negative for chest pain, palpitations, orthopnea and leg swelling.   Gastrointestinal: Negative for heartburn, nausea, vomiting and abdominal pain.   Genitourinary: Negative for dysuria, urgency and frequency.   Musculoskeletal: Negative for myalgias, back pain and joint pain.   Skin: Negative for rash and itching.   Neurological: Negative for dizziness, tingling, tremors, sensory change, focal weakness and headaches.   Endo/Heme/Allergies: Does not bruise/bleed easily.   Psychiatric/Behavioral: Negative for depression, suicidal ideas and memory loss.  The patient is not nervous/anxious and does not have insomnia.      Exam:  Blood pressure 132/80, pulse 76, temperature 37.1 °C (98.8 °F), resp. rate 16, height 1.549 m (5' 1\"), weight 65.3 kg (144 lb), SpO2 96 %.  General:  Well nourished, well developed female in NAD  HEENT: Normocephalic and atraumatic. TMs clear bilaterally. Oropharynx is clear      without erythema and no tonsillar exudate. Nasal mucosa pink with minimal      Rhinorrhea.  EYES: EOMI.  Conjunctiva clear.    Neck: Supple without JVD or bruit. Thyroid is not enlarged.  Pulmonary: Clear to ausculation and percussion.  Normal effort. No rales, ronchi, or wheezing.  Cardiovascular: Regular rate and rhythm without murmur, no peripheral edema  Abdomen: positive bowel sounds.  Non tender, non distended, no hepatosplenomegaly.   MSK: normal ROM in upper and lower extremities bilaterally  Psych: appropriate affect and concentration, oriented to person and place  Neuro: no unilateral weakness in upper or lower extremities.  No gait disturbance    Please note that this dictation was created using voice recognition software. I have made every reasonable attempt " to correct obvious errors, but I expect that there are errors of grammar and possibly content that I did not discover before finalizing the note.    Assessment/Plan:  1. Well adult exam  Continue healthy lifestyle, maintain normal weight      2. Foot pain, right  - Diclofenac Sodium (VOLTAREN) 1 % Gel; Apply 4 g to skin as directed 3 times a day as needed.  Dispense: 1 Tube; Refill: 5    3. Essential hypertension  - amLODIPine (NORVASC) 5 MG Tab; Take 1 Tab by mouth every day.  Dispense: 90 Tab; Refill: 3  - COMP METABOLIC PANEL; Future  - LIPID PROFILE; Future    4. Family history of diabetes mellitus  - HEMOGLOBIN A1C; Future    5. Screening for cardiovascular condition  - COMP METABOLIC PANEL; Future  - LIPID PROFILE; Future    6. Screening for breast cancer  - MA-MAMMO SCREENING BILAT W/DARIUS W/O CAD; Future    7. Screening for colon cancer  - OCCULT BLOOD, FECAL IMMUNOASSAY

## 2018-02-06 ENCOUNTER — OFFICE VISIT (OUTPATIENT)
Dept: URGENT CARE | Facility: PHYSICIAN GROUP | Age: 58
End: 2018-02-06
Payer: COMMERCIAL

## 2018-02-06 ENCOUNTER — HOSPITAL ENCOUNTER (OUTPATIENT)
Dept: RADIOLOGY | Facility: MEDICAL CENTER | Age: 58
End: 2018-02-06
Attending: PHYSICIAN ASSISTANT
Payer: COMMERCIAL

## 2018-02-06 VITALS
WEIGHT: 144 LBS | RESPIRATION RATE: 16 BRPM | BODY MASS INDEX: 27.19 KG/M2 | TEMPERATURE: 98.8 F | HEART RATE: 87 BPM | DIASTOLIC BLOOD PRESSURE: 88 MMHG | HEIGHT: 61 IN | OXYGEN SATURATION: 96 % | SYSTOLIC BLOOD PRESSURE: 118 MMHG

## 2018-02-06 DIAGNOSIS — M25.511 ACUTE PAIN OF RIGHT SHOULDER: ICD-10-CM

## 2018-02-06 DIAGNOSIS — M62.838 MUSCLE SPASM: ICD-10-CM

## 2018-02-06 PROCEDURE — 99214 OFFICE O/P EST MOD 30 MIN: CPT | Mod: 25 | Performed by: PHYSICIAN ASSISTANT

## 2018-02-06 PROCEDURE — 73030 X-RAY EXAM OF SHOULDER: CPT | Mod: RT

## 2018-02-06 RX ORDER — METHYLPREDNISOLONE 4 MG/1
TABLET ORAL
Qty: 21 TAB | Refills: 0 | Status: ON HOLD | OUTPATIENT
Start: 2018-02-06 | End: 2018-02-16

## 2018-02-06 RX ORDER — CYCLOBENZAPRINE HCL 10 MG
10 TABLET ORAL 3 TIMES DAILY PRN
Qty: 30 TAB | Refills: 0 | Status: SHIPPED | OUTPATIENT
Start: 2018-02-06 | End: 2018-04-19

## 2018-02-06 RX ORDER — KETOROLAC TROMETHAMINE 30 MG/ML
60 INJECTION, SOLUTION INTRAMUSCULAR; INTRAVENOUS ONCE
Status: COMPLETED | OUTPATIENT
Start: 2018-02-06 | End: 2018-02-06

## 2018-02-06 RX ADMIN — KETOROLAC TROMETHAMINE 60 MG: 30 INJECTION, SOLUTION INTRAMUSCULAR; INTRAVENOUS at 08:41

## 2018-02-06 ASSESSMENT — ENCOUNTER SYMPTOMS
CONSTITUTIONAL NEGATIVE: 1
TINGLING: 0
RESPIRATORY NEGATIVE: 1
FALLS: 0
SENSORY CHANGE: 0
GASTROINTESTINAL NEGATIVE: 1
NECK PAIN: 1
PALPITATIONS: 0

## 2018-02-06 NOTE — PROGRESS NOTES
Subjective:      Urszula Persaud is a 57 y.o. female who presents with Shoulder Pain (woke up with R shoulder pain x3 days)            Shoulder Pain   This is a new problem. The current episode started in the past 7 days. The problem occurs constantly. The problem has been unchanged. Associated symptoms include neck pain. Pertinent negatives include no chest pain. The symptoms are aggravated by bending and twisting. She has tried NSAIDs and ice for the symptoms. The treatment provided mild relief.   Patient woke up 3 days ago with right trapezius muscle pain and spasm. No injury or falls. No previous injury to the shoulder. Range of motion shoulder and neck Limited secondary to stiffness. Feels like a squeezing/cramping. The pain does not radiate. Denies chest pain, palpitations, short of breath, vision changes, nausea, numbness or tingling.      PMH:  has no past medical history on file.  MEDS:   Current Outpatient Prescriptions:   •  MethylPREDNISolone (MEDROL DOSEPAK) 4 MG Tablet Therapy Pack, Medrol Dosepack, Use as directed, Disp: 21 Tab, Rfl: 0  •  cyclobenzaprine (FLEXERIL) 10 MG Tab, Take 1 Tab by mouth 3 times a day as needed., Disp: 30 Tab, Rfl: 0  •  Diclofenac Sodium (VOLTAREN) 1 % Gel, Apply 4 g to skin as directed 3 times a day as needed., Disp: 1 Tube, Rfl: 5  •  amLODIPine (NORVASC) 5 MG Tab, Take 1 Tab by mouth every day., Disp: 90 Tab, Rfl: 3  •  nystatin/triamcinolone (MYCOLOG) 648350-4.1 UNIT/GM-% Cream, Apply nightly for 3 nights., Disp: 30 g, Rfl: 0  •  triamcinolone acetonide (KENALOG) 0.1 % Cream, Apply to affected area twice daily until resolved, Disp: 1 Tube, Rfl: 1  •  meclizine (ANTIVERT) 25 MG Tab, Take 25 mg by mouth 3 times a day as needed., Disp: , Rfl:   ALLERGIES: No Known Allergies  SURGHX:   Past Surgical History:   Procedure Laterality Date   • VEIN LIGATION  2006     SOCHX:  reports that she has never smoked. She has never used smokeless tobacco. She reports that she does not  "drink alcohol or use drugs.  FH: family history is not on file.    Review of Systems   Constitutional: Negative.    HENT: Negative.    Respiratory: Negative.    Cardiovascular: Negative for chest pain, palpitations and leg swelling.   Gastrointestinal: Negative.    Musculoskeletal: Positive for joint pain (Right shoulder) and neck pain. Negative for falls.   Neurological: Negative for tingling and sensory change.       Medications, Allergies, and current problem list reviewed today in Epic     Objective:     /88   Pulse 87   Temp 37.1 °C (98.8 °F)   Resp 16   Ht 1.549 m (5' 1\")   Wt 65.3 kg (144 lb)   SpO2 96%   Breastfeeding? No   BMI 27.21 kg/m²      Physical Exam   Constitutional: She is oriented to person, place, and time. She appears well-developed and well-nourished. No distress.   HENT:   Head: Normocephalic and atraumatic.   Eyes: Conjunctivae and EOM are normal.   Neck: Normal range of motion. Neck supple.   Cardiovascular: Normal rate, regular rhythm and normal heart sounds.    Pulmonary/Chest: Effort normal and breath sounds normal. No respiratory distress. She has no wheezes.   Musculoskeletal:        Right shoulder: She exhibits decreased range of motion, tenderness, pain and spasm. She exhibits no bony tenderness, no swelling, no effusion, no crepitus, no deformity, normal pulse and normal strength.        Arms:  Tenderness over the right trapezius muscle radiating into the right sternocleidomastoid. Range of motion in cervical spine within normal limits but painful at extremes. Range of motion shoulder limited secondary to pain. Distal neurovascular intact.   Neurological: She is alert and oriented to person, place, and time.   Skin: Skin is warm and dry. She is not diaphoretic.   Psychiatric: She has a normal mood and affect. Her behavior is normal. Judgment and thought content normal.   Nursing note and vitals reviewed.         Xray: no fracture or dislocation by my read. Radiology " review pending.     Assessment/Plan:     1. Acute pain of right shoulder  ketorolac (TORADOL) injection 60 mg    DX-SHOULDER 2+ RIGHT    MethylPREDNISolone (MEDROL DOSEPAK) 4 MG Tablet Therapy Pack   2. Muscle spasm  MethylPREDNISolone (MEDROL DOSEPAK) 4 MG Tablet Therapy Pack    cyclobenzaprine (FLEXERIL) 10 MG Tab     X-ray negative. Appears to be soft tissue pain and spasm. Diffuse reproducible point muscular tenderness. Vital signs normal, patient denies any cardiac symptoms. Unclear etiology.  Patient given a Toradol injection, monitored for 15 minutes, no adverse reaction  Medrol Dosepak and Flexeril, increase fluids, do not drive  OTC meds and conservative measures as discussed  If no improvement over the next 48 hours daughter will take patient to the ER.  Return to clinic or go to ED if symptoms worsen or persist. Indications for ED discussed at length. Patient voices understanding. Follow-up with your primary care provider in 3-5 days. Red flags discussed.    Please note that this dictation was created using voice recognition software. I have made every reasonable attempt to correct obvious errors, but I expect that there are errors of grammar and possibly content that I did not discover before finalizing the note.

## 2018-02-07 ENCOUNTER — APPOINTMENT (OUTPATIENT)
Dept: RADIOLOGY | Facility: MEDICAL CENTER | Age: 58
DRG: 853 | End: 2018-02-07
Attending: EMERGENCY MEDICINE
Payer: COMMERCIAL

## 2018-02-07 ENCOUNTER — RESOLUTE PROFESSIONAL BILLING HOSPITAL PROF FEE (OUTPATIENT)
Dept: HOSPITALIST | Facility: MEDICAL CENTER | Age: 58
End: 2018-02-07
Payer: COMMERCIAL

## 2018-02-07 ENCOUNTER — HOSPITAL ENCOUNTER (INPATIENT)
Facility: MEDICAL CENTER | Age: 58
LOS: 9 days | DRG: 853 | End: 2018-02-16
Attending: EMERGENCY MEDICINE | Admitting: HOSPITALIST
Payer: COMMERCIAL

## 2018-02-07 DIAGNOSIS — M54.2 NECK PAIN: ICD-10-CM

## 2018-02-07 DIAGNOSIS — A41.9 SEPSIS, DUE TO UNSPECIFIED ORGANISM: ICD-10-CM

## 2018-02-07 DIAGNOSIS — M25.511 RIGHT SHOULDER PAIN, UNSPECIFIED CHRONICITY: ICD-10-CM

## 2018-02-07 DIAGNOSIS — J18.9 PNEUMONIA OF RIGHT LOWER LOBE DUE TO INFECTIOUS ORGANISM: ICD-10-CM

## 2018-02-07 PROBLEM — E87.20 LACTIC ACIDOSIS: Status: ACTIVE | Noted: 2018-02-07

## 2018-02-07 PROBLEM — E87.1 HYPONATREMIA: Status: ACTIVE | Noted: 2018-02-07

## 2018-02-07 PROBLEM — E87.6 HYPOKALEMIA: Status: ACTIVE | Noted: 2018-02-07

## 2018-02-07 LAB
ALBUMIN SERPL BCP-MCNC: 4.1 G/DL (ref 3.2–4.9)
ALBUMIN/GLOB SERPL: 1.3 G/DL
ALP SERPL-CCNC: 66 U/L (ref 30–99)
ALT SERPL-CCNC: 53 U/L (ref 2–50)
ANION GAP SERPL CALC-SCNC: 12 MMOL/L (ref 0–11.9)
AST SERPL-CCNC: 34 U/L (ref 12–45)
BASOPHILS # BLD AUTO: 0.1 % (ref 0–1.8)
BASOPHILS # BLD: 0.02 K/UL (ref 0–0.12)
BILIRUB SERPL-MCNC: 1.1 MG/DL (ref 0.1–1.5)
BNP SERPL-MCNC: 60 PG/ML (ref 0–100)
BUN SERPL-MCNC: 10 MG/DL (ref 8–22)
CALCIUM SERPL-MCNC: 8.9 MG/DL (ref 8.5–10.5)
CHLORIDE SERPL-SCNC: 94 MMOL/L (ref 96–112)
CO2 SERPL-SCNC: 23 MMOL/L (ref 20–33)
CREAT SERPL-MCNC: 0.65 MG/DL (ref 0.5–1.4)
EKG IMPRESSION: NORMAL
EOSINOPHIL # BLD AUTO: 0 K/UL (ref 0–0.51)
EOSINOPHIL NFR BLD: 0 % (ref 0–6.9)
ERYTHROCYTE [DISTWIDTH] IN BLOOD BY AUTOMATED COUNT: 45.1 FL (ref 35.9–50)
GLOBULIN SER CALC-MCNC: 3.2 G/DL (ref 1.9–3.5)
GLUCOSE SERPL-MCNC: 184 MG/DL (ref 65–99)
HCT VFR BLD AUTO: 37.4 % (ref 37–47)
HGB BLD-MCNC: 13.3 G/DL (ref 12–16)
IMM GRANULOCYTES # BLD AUTO: 0.24 K/UL (ref 0–0.11)
IMM GRANULOCYTES NFR BLD AUTO: 1.3 % (ref 0–0.9)
LACTATE BLD-SCNC: 3.2 MMOL/L (ref 0.5–2)
LACTATE BLD-SCNC: 3.5 MMOL/L (ref 0.5–2)
LYMPHOCYTES # BLD AUTO: 0.63 K/UL (ref 1–4.8)
LYMPHOCYTES NFR BLD: 3.4 % (ref 22–41)
MCH RBC QN AUTO: 33.3 PG (ref 27–33)
MCHC RBC AUTO-ENTMCNC: 35.6 G/DL (ref 33.6–35)
MCV RBC AUTO: 93.7 FL (ref 81.4–97.8)
MONOCYTES # BLD AUTO: 1.41 K/UL (ref 0–0.85)
MONOCYTES NFR BLD AUTO: 7.7 % (ref 0–13.4)
NEUTROPHILS # BLD AUTO: 16.07 K/UL (ref 2–7.15)
NEUTROPHILS NFR BLD: 87.5 % (ref 44–72)
NRBC # BLD AUTO: 0 K/UL
NRBC BLD-RTO: 0 /100 WBC
PLATELET # BLD AUTO: 187 K/UL (ref 164–446)
PMV BLD AUTO: 9.7 FL (ref 9–12.9)
POTASSIUM SERPL-SCNC: 3.5 MMOL/L (ref 3.6–5.5)
PROT SERPL-MCNC: 7.3 G/DL (ref 6–8.2)
RBC # BLD AUTO: 3.99 M/UL (ref 4.2–5.4)
SODIUM SERPL-SCNC: 129 MMOL/L (ref 135–145)
TROPONIN I SERPL-MCNC: <0.01 NG/ML (ref 0–0.04)
WBC # BLD AUTO: 18.4 K/UL (ref 4.8–10.8)

## 2018-02-07 PROCEDURE — 72156 MRI NECK SPINE W/O & W/DYE: CPT

## 2018-02-07 PROCEDURE — 700105 HCHG RX REV CODE 258: Performed by: HOSPITALIST

## 2018-02-07 PROCEDURE — 87077 CULTURE AEROBIC IDENTIFY: CPT

## 2018-02-07 PROCEDURE — 99285 EMERGENCY DEPT VISIT HI MDM: CPT

## 2018-02-07 PROCEDURE — 99223 1ST HOSP IP/OBS HIGH 75: CPT | Performed by: HOSPITALIST

## 2018-02-07 PROCEDURE — 70491 CT SOFT TISSUE NECK W/DYE: CPT

## 2018-02-07 PROCEDURE — A9270 NON-COVERED ITEM OR SERVICE: HCPCS | Performed by: EMERGENCY MEDICINE

## 2018-02-07 PROCEDURE — 36415 COLL VENOUS BLD VENIPUNCTURE: CPT

## 2018-02-07 PROCEDURE — 93005 ELECTROCARDIOGRAM TRACING: CPT | Performed by: EMERGENCY MEDICINE

## 2018-02-07 PROCEDURE — 96361 HYDRATE IV INFUSION ADD-ON: CPT

## 2018-02-07 PROCEDURE — 73221 MRI JOINT UPR EXTREM W/O DYE: CPT | Mod: RT

## 2018-02-07 PROCEDURE — 71275 CT ANGIOGRAPHY CHEST: CPT

## 2018-02-07 PROCEDURE — 700117 HCHG RX CONTRAST REV CODE 255: Performed by: EMERGENCY MEDICINE

## 2018-02-07 PROCEDURE — 87150 DNA/RNA AMPLIFIED PROBE: CPT | Mod: 91

## 2018-02-07 PROCEDURE — 700111 HCHG RX REV CODE 636 W/ 250 OVERRIDE (IP): Performed by: HOSPITALIST

## 2018-02-07 PROCEDURE — 770020 HCHG ROOM/CARE - TELE (206)

## 2018-02-07 PROCEDURE — 85025 COMPLETE CBC W/AUTO DIFF WBC: CPT

## 2018-02-07 PROCEDURE — 96365 THER/PROPH/DIAG IV INF INIT: CPT

## 2018-02-07 PROCEDURE — 304561 HCHG STAT O2

## 2018-02-07 PROCEDURE — A9270 NON-COVERED ITEM OR SERVICE: HCPCS | Performed by: HOSPITALIST

## 2018-02-07 PROCEDURE — 96375 TX/PRO/DX INJ NEW DRUG ADDON: CPT

## 2018-02-07 PROCEDURE — 87040 BLOOD CULTURE FOR BACTERIA: CPT

## 2018-02-07 PROCEDURE — 72158 MRI LUMBAR SPINE W/O & W/DYE: CPT

## 2018-02-07 PROCEDURE — 700102 HCHG RX REV CODE 250 W/ 637 OVERRIDE(OP): Performed by: HOSPITALIST

## 2018-02-07 PROCEDURE — 87186 SC STD MICRODIL/AGAR DIL: CPT

## 2018-02-07 PROCEDURE — 83880 ASSAY OF NATRIURETIC PEPTIDE: CPT

## 2018-02-07 PROCEDURE — 80053 COMPREHEN METABOLIC PANEL: CPT

## 2018-02-07 PROCEDURE — 72157 MRI CHEST SPINE W/O & W/DYE: CPT

## 2018-02-07 PROCEDURE — 84484 ASSAY OF TROPONIN QUANT: CPT

## 2018-02-07 PROCEDURE — 700111 HCHG RX REV CODE 636 W/ 250 OVERRIDE (IP): Performed by: EMERGENCY MEDICINE

## 2018-02-07 PROCEDURE — 83605 ASSAY OF LACTIC ACID: CPT

## 2018-02-07 PROCEDURE — 700102 HCHG RX REV CODE 250 W/ 637 OVERRIDE(OP): Performed by: EMERGENCY MEDICINE

## 2018-02-07 PROCEDURE — A9579 GAD-BASE MR CONTRAST NOS,1ML: HCPCS | Performed by: EMERGENCY MEDICINE

## 2018-02-07 PROCEDURE — 700105 HCHG RX REV CODE 258: Performed by: EMERGENCY MEDICINE

## 2018-02-07 RX ORDER — AMOXICILLIN 250 MG
2 CAPSULE ORAL 2 TIMES DAILY
Status: DISCONTINUED | OUTPATIENT
Start: 2018-02-07 | End: 2018-02-16 | Stop reason: HOSPADM

## 2018-02-07 RX ORDER — AZITHROMYCIN 500 MG/1
500 INJECTION, POWDER, LYOPHILIZED, FOR SOLUTION INTRAVENOUS ONCE
Status: DISCONTINUED | OUTPATIENT
Start: 2018-02-07 | End: 2018-02-07

## 2018-02-07 RX ORDER — HEPARIN SODIUM 5000 [USP'U]/ML
5000 INJECTION, SOLUTION INTRAVENOUS; SUBCUTANEOUS EVERY 8 HOURS
Status: DISCONTINUED | OUTPATIENT
Start: 2018-02-07 | End: 2018-02-10

## 2018-02-07 RX ORDER — OXYCODONE HYDROCHLORIDE 5 MG/1
2.5 TABLET ORAL
Status: DISCONTINUED | OUTPATIENT
Start: 2018-02-07 | End: 2018-02-11

## 2018-02-07 RX ORDER — SODIUM CHLORIDE 9 MG/ML
30 INJECTION, SOLUTION INTRAVENOUS
Status: DISCONTINUED | OUTPATIENT
Start: 2018-02-07 | End: 2018-02-11

## 2018-02-07 RX ORDER — ACETAMINOPHEN 325 MG/1
650 TABLET ORAL EVERY 6 HOURS PRN
Status: DISCONTINUED | OUTPATIENT
Start: 2018-02-07 | End: 2018-02-16 | Stop reason: HOSPADM

## 2018-02-07 RX ORDER — AZITHROMYCIN 250 MG/1
500 TABLET, FILM COATED ORAL ONCE
Status: COMPLETED | OUTPATIENT
Start: 2018-02-07 | End: 2018-02-07

## 2018-02-07 RX ORDER — SODIUM CHLORIDE 9 MG/ML
1000 INJECTION, SOLUTION INTRAVENOUS
Status: DISCONTINUED | OUTPATIENT
Start: 2018-02-07 | End: 2018-02-11

## 2018-02-07 RX ORDER — HYDROMORPHONE HYDROCHLORIDE 2 MG/ML
0.25 INJECTION, SOLUTION INTRAMUSCULAR; INTRAVENOUS; SUBCUTANEOUS
Status: DISCONTINUED | OUTPATIENT
Start: 2018-02-07 | End: 2018-02-11

## 2018-02-07 RX ORDER — SODIUM CHLORIDE 9 MG/ML
INJECTION, SOLUTION INTRAVENOUS CONTINUOUS
Status: DISCONTINUED | OUTPATIENT
Start: 2018-02-07 | End: 2018-02-09

## 2018-02-07 RX ORDER — CYCLOBENZAPRINE HCL 10 MG
10 TABLET ORAL 3 TIMES DAILY PRN
Status: DISCONTINUED | OUTPATIENT
Start: 2018-02-07 | End: 2018-02-16 | Stop reason: HOSPADM

## 2018-02-07 RX ORDER — OXYCODONE HYDROCHLORIDE 5 MG/1
5 TABLET ORAL
Status: DISCONTINUED | OUTPATIENT
Start: 2018-02-07 | End: 2018-02-11

## 2018-02-07 RX ORDER — HYDROMORPHONE HYDROCHLORIDE 2 MG/ML
0.5 INJECTION, SOLUTION INTRAMUSCULAR; INTRAVENOUS; SUBCUTANEOUS
Status: DISCONTINUED | OUTPATIENT
Start: 2018-02-07 | End: 2018-02-07

## 2018-02-07 RX ORDER — IBUPROFEN 800 MG/1
800 TABLET ORAL ONCE
Status: COMPLETED | OUTPATIENT
Start: 2018-02-08 | End: 2018-02-08

## 2018-02-07 RX ORDER — POLYETHYLENE GLYCOL 3350 17 G/17G
1 POWDER, FOR SOLUTION ORAL
Status: DISCONTINUED | OUTPATIENT
Start: 2018-02-07 | End: 2018-02-16 | Stop reason: HOSPADM

## 2018-02-07 RX ORDER — ACETAMINOPHEN 325 MG/1
650 TABLET ORAL ONCE
Status: COMPLETED | OUTPATIENT
Start: 2018-02-07 | End: 2018-02-07

## 2018-02-07 RX ORDER — ONDANSETRON 2 MG/ML
4 INJECTION INTRAMUSCULAR; INTRAVENOUS ONCE
Status: COMPLETED | OUTPATIENT
Start: 2018-02-07 | End: 2018-02-07

## 2018-02-07 RX ORDER — SODIUM CHLORIDE 9 MG/ML
1000 INJECTION, SOLUTION INTRAVENOUS ONCE
Status: COMPLETED | OUTPATIENT
Start: 2018-02-07 | End: 2018-02-07

## 2018-02-07 RX ORDER — BISACODYL 10 MG
10 SUPPOSITORY, RECTAL RECTAL
Status: DISCONTINUED | OUTPATIENT
Start: 2018-02-07 | End: 2018-02-16 | Stop reason: HOSPADM

## 2018-02-07 RX ADMIN — HYDROMORPHONE HYDROCHLORIDE 0.5 MG: 2 INJECTION INTRAMUSCULAR; INTRAVENOUS; SUBCUTANEOUS at 15:47

## 2018-02-07 RX ADMIN — SODIUM CHLORIDE 1000 ML: 9 INJECTION, SOLUTION INTRAVENOUS at 18:54

## 2018-02-07 RX ADMIN — SODIUM CHLORIDE: 9 INJECTION, SOLUTION INTRAVENOUS at 22:53

## 2018-02-07 RX ADMIN — VANCOMYCIN HYDROCHLORIDE 1700 MG: 100 INJECTION, POWDER, LYOPHILIZED, FOR SOLUTION INTRAVENOUS at 23:39

## 2018-02-07 RX ADMIN — SODIUM CHLORIDE: 9 INJECTION, SOLUTION INTRAVENOUS at 20:41

## 2018-02-07 RX ADMIN — ONDANSETRON 4 MG: 2 INJECTION INTRAMUSCULAR; INTRAVENOUS at 15:46

## 2018-02-07 RX ADMIN — TAZOBACTAM SODIUM AND PIPERACILLIN SODIUM 3.38 G: 375; 3 INJECTION, SOLUTION INTRAVENOUS at 21:55

## 2018-02-07 RX ADMIN — AZITHROMYCIN 500 MG: 250 TABLET, FILM COATED ORAL at 20:41

## 2018-02-07 RX ADMIN — AMPICILLIN SODIUM AND SULBACTAM SODIUM 3 G: 2; 1 INJECTION, POWDER, FOR SOLUTION INTRAMUSCULAR; INTRAVENOUS at 18:54

## 2018-02-07 RX ADMIN — GADODIAMIDE 15 ML: 287 INJECTION INTRAVENOUS at 20:26

## 2018-02-07 RX ADMIN — OXYCODONE HYDROCHLORIDE 5 MG: 5 TABLET ORAL at 20:41

## 2018-02-07 RX ADMIN — HYDROMORPHONE HYDROCHLORIDE 0.5 MG: 2 INJECTION INTRAMUSCULAR; INTRAVENOUS; SUBCUTANEOUS at 17:15

## 2018-02-07 RX ADMIN — IOHEXOL 100 ML: 350 INJECTION, SOLUTION INTRAVENOUS at 17:39

## 2018-02-07 RX ADMIN — ACETAMINOPHEN 650 MG: 325 TABLET, FILM COATED ORAL at 18:31

## 2018-02-07 ASSESSMENT — ENCOUNTER SYMPTOMS
SHORTNESS OF BREATH: 0
CONSTIPATION: 0
TINGLING: 0
PALPITATIONS: 0
SENSORY CHANGE: 0
HEARTBURN: 0
DOUBLE VISION: 0
CHILLS: 0
MEMORY LOSS: 0
SORE THROAT: 0
HEMOPTYSIS: 0
NERVOUS/ANXIOUS: 1
COUGH: 0
DIZZINESS: 0
HEADACHES: 0
WEAKNESS: 1
EYE PAIN: 0
SPUTUM PRODUCTION: 0
PND: 0
NAUSEA: 0
CLAUDICATION: 0
BLURRED VISION: 0
STRIDOR: 0
VOMITING: 0
DEPRESSION: 0
MYALGIAS: 1
BLOOD IN STOOL: 0
TREMORS: 0
SPEECH CHANGE: 0
PHOTOPHOBIA: 0
FEVER: 0
ORTHOPNEA: 0
NECK PAIN: 1
BACK PAIN: 0

## 2018-02-07 ASSESSMENT — PAIN SCALES - GENERAL
PAINLEVEL_OUTOF10: 6
PAINLEVEL_OUTOF10: 10

## 2018-02-07 ASSESSMENT — LIFESTYLE VARIABLES: DO YOU DRINK ALCOHOL: NO

## 2018-02-07 NOTE — ED PROVIDER NOTES
ED Provider Note    Scribed for Oz Alanis M.D. by Lit Whitman. 2/7/2018  2:52 PM    Primary care provider: Makeda Jaquez M.D.  Means of arrival: Walk-in  History obtained from: Patient  History limited by: None    CHIEF COMPLAINT  Chief Complaint   Patient presents with   • Shoulder Pain       HPI  Urszula Persaud is a 57 y.o. female who presents to the Emergency Department complaining of constant and severe right shoulder pain. The pain is exacerbated by palpation and taking deep breaths. The pain radiates up her neck to her head and she describes her head pain as a tension headache. She denies fever, sore throat, rhinorrhea, or cough. Patient was seen at Urgent Care yesterday and received negative shoulder x-rays. Patient has beeen taking a muscle relaxant and steroid followed by acetaminophen for management of her pain since she was discharged from Urgent Care. She last took these medications at 12:00 PM today. Also her family has tried hot showers and ice application without respite from her pain. She denies a history of blood clots or cancer.    REVIEW OF SYSTEMS  Pertinent positives include right shoulder pain, neck pain, and headache. Pertinent negatives include no fever, sore throat, rhinorrhea, or cough.  All other systems negative.  C    PAST MEDICAL HISTORY   No blood clots or cancer.    SURGICAL HISTORY   has a past surgical history that includes vein ligation (2006).    SOCIAL HISTORY  Social History   Substance Use Topics   • Smoking status: Never Smoker   • Smokeless tobacco: Never Used   • Alcohol use No      History   Drug Use No       FAMILY HISTORY  Family History   Problem Relation Age of Onset   • Diabetes Neg Hx    • Cancer Neg Hx        CURRENT MEDICATIONS  Home Medications     Reviewed by Elizabeth Sheth (Pharmacy Tech) on 02/07/18 at 4662  Med List Status: Complete   Medication Last Dose Status   amLODIPine (NORVASC) 5 MG Tab 2/7/2018 Active   cyclobenzaprine  "(FLEXERIL) 10 MG Tab 2/7/2018 Active   Diclofenac Sodium (VOLTAREN) 1 % Gel 2/6/2018 Active   MethylPREDNISolone (MEDROL DOSEPAK) 4 MG Tablet Therapy Pack 2/7/2018 Active                ALLERGIES  No Known Allergies    PHYSICAL EXAM  VITAL SIGNS: /98   Pulse (!) 153   Temp 37.4 °C (99.3 °F)   Resp 16   Ht 1.549 m (5' 1\")   Wt 66.2 kg (146 lb)   SpO2 96%   BMI 27.59 kg/m²     Constitutional: Well developed, Well nourished, No acute distress, Non-toxic appearance.   HENT: Normocephalic, Atraumatic, Bilateral external ears normal, Dry mucous membranes, No oral exudates.   Eyes: PERRLA, EOMI, Conjunctiva normal, No discharge.   Neck: No tenderness, Supple, No stridor.   Lymphatic: No lymphadenopathy noted.   Cardiovascular: Tachycardic, Normal rhythm.   Thorax & Lungs: Crackles on the right upper lung field, No respiratory distress, No wheezing  Abdomen: Soft, No tenderness, No masses, No pulsatile masses.   Skin: Warm, Dry, No erythema, No rash.   Extremities:, No edema No cyanosis.   Musculoskeletal: Tenderness to palpation primarily of the right trapezius and paraspinal rhomboid area, worse with breathing. Slight tenderness of the right supraclavicular area. No major deformities noted.  Intact distal pulses  Neurologic: Awake, alert. Moves all extremities spontaneously.  Psychiatric: Affect normal, Judgment normal, Mood normal.     LABS  Labs Reviewed   CBC WITH DIFFERENTIAL - Abnormal; Notable for the following:        Result Value    WBC 18.4 (*)     RBC 3.99 (*)     MCH 33.3 (*)     MCHC 35.6 (*)     Neutrophils-Polys 87.50 (*)     Lymphocytes 3.40 (*)     Immature Granulocytes 1.30 (*)     Neutrophils (Absolute) 16.07 (*)     Lymphs (Absolute) 0.63 (*)     Monos (Absolute) 1.41 (*)     Immature Granulocytes (abs) 0.24 (*)     All other components within normal limits   COMP METABOLIC PANEL - Abnormal; Notable for the following:     Sodium 129 (*)     Potassium 3.5 (*)     Chloride 94 (*)     Anion " "Gap 12.0 (*)     Glucose 184 (*)     ALT(SGPT) 53 (*)     All other components within normal limits   LACTIC ACID - Abnormal; Notable for the following:     Lactic Acid 3.2 (*)     All other components within normal limits   TROPONIN   BTYPE NATRIURETIC PEPTIDE   ESTIMATED GFR   BLOOD CULTURE    Narrative:     1 of 2 for Blood Culture x 2 sites order. Per Hospital  Policy: Only change Specimen Src: to \"Line\" if specified by  physician order.   BLOOD CULTURE    Narrative:     2 of 2 blood culture x2  Sites order. Per Hospital Policy:  Only change Specimen Src: to \"Line\" if specified by physician  order.   LACTIC ACID     All labs reviewed by me.    RADIOLOGY  CT-SOFT TISSUE NECK WITH   Final Result      1.  No acute inflammatory process identified in the soft tissues of the neck.      2.  Thyroid gland enlargement.      3.  Minor degenerative changes posteriorly at C5-6 and C6-7.      CT-CTA CHEST PULMONARY ARTERY W/ RECONS   Final Result      1.  No evidence of pulmonary embolism.      2.  Minimal atelectasis or pneumonia in the right lower lobe and in the lingula.      3.  Mild bilateral adrenal gland enlargement. Significance unknown.            MR-CERVICAL SPINE-WITH & W/O    (Results Pending)   MR-LUMBAR SPINE-WITH & W/O    (Results Pending)   MR-THORACIC SPINE-WITH & W/O    (Results Pending)   MR-SHOULDER-W/O RIGHT    (Results Pending)     The radiologist's interpretation of all radiological studies have been reviewed by me.    COURSE & MEDICAL DECISION MAKING  Nursing notes, VS, PMSFHx reviewed in chart.    Differential diagnoses include but not limited to: Muscle spasm vs mass vs PE vs pneumonia    2:52 PM - Patient seen and examined at bedside. Patient will be treated with ondansetron injection 4 mg and hydromorphone injection 0.5 mg. Ordered CT soft tissue neck with contrast, CT CTA chest pulmonary artery with recons, estimated GFR, CBC with differential, CMP, Troponin STAT, BNP, and EKG to evaluate her " symptoms.     Decision Making:  Patient with exquisite tenderness to the right trapezius area, the patient is tachycardic, mildly elevated temperature. Due the degree of pain, tachycardia, I entertained the idea of a possible pulmonary embolism versus a mass, therefore get a CT soft tissue neck and an CT scan of the chest to rule out PE. Given the patient IV fluids, pain medicines however the patient spiked a fever up to 102.2, give the patient IV antibiotics, question small pneumonia on CT. I reevaluated the patient, the patient does not have any focal tenderness at the shoulder, does not have an effusion, no erythema, has minimal pain with passive range of motion however with full range of motion she does have pain throughout the area. Differential could possibly be due to epidural abscess, discitis, septic arthritis, therefore get an MRI with and without of her spine, also get an MRI of her right shoulder. I've given the patient IV antibiotics, discussed the case with the hospitalist for admission to the hospital.    DISPOSITION:  Patient will be admitted to Dr. Chong in critical condition.      FINAL IMPRESSION  1. Sepsis, due to unspecified organism (CMS-HCC)    2. Neck pain    3. Pneumonia of right lower lobe due to infectious organism (CMS-HCC)    4. Critical care time of 35 minutes      ILit (Scribe), am scribing for, and in the presence of, Oz Alanis M.D..    Electronically signed by: Lit Whitman (Scribe), 2/7/2018    IOz M.D. personally performed the services described in this documentation, as scribed by Lit Whitman in my presence, and it is both accurate and complete.    The note accurately reflects work and decisions made by me.  Oz Alanis  2/7/2018  7:29 PM      8:58 PM I received a call from radiology, describing an abnormal signal in the patient's painful shoulder, which suggests some fluid, though no gas or air. Considering  that this patient is being admitted in critical condition without any obvious source, this does raise concern for infection in the shoulder itself. I have paged the admitting hospitalist to update him on this discussion with radiology.    9:44 PM I discussed this case with Dr. nhan mcneal and, on call for orthopedics. He has reviewed and sees the abnormality on the patient's chest wall, and we'll discuss the case with Dr. Pruitt, on call for general surgery, and consider exploration/debridement, and coordination with the hospitalist service, who has already placed admission orders on this patient.

## 2018-02-07 NOTE — ED TRIAGE NOTES
"Chief Complaint   Patient presents with   • Shoulder Pain     Ambulatory to triage, pt tearful. R shoulder pain. Seen yesterday at , xrays negative.     /98   Pulse (!) 153   Temp 37.4 °C (99.3 °F)   Resp 16   Ht 1.549 m (5' 1\")   Wt 66.2 kg (146 lb)   SpO2 96%   BMI 27.59 kg/m²     Pt Informed regarding triage process and verbalized understanding to inform triage tech or RN for any changes in condition.  Placed in lobby.    "

## 2018-02-08 LAB
ALBUMIN SERPL BCP-MCNC: 3.8 G/DL (ref 3.2–4.9)
ALBUMIN/GLOB SERPL: 1.4 G/DL
ALP SERPL-CCNC: 50 U/L (ref 30–99)
ALT SERPL-CCNC: 49 U/L (ref 2–50)
ANION GAP SERPL CALC-SCNC: 8 MMOL/L (ref 0–11.9)
APPEARANCE UR: CLEAR
AST SERPL-CCNC: 25 U/L (ref 12–45)
BASOPHILS # BLD AUTO: 0.4 % (ref 0–1.8)
BASOPHILS # BLD: 0.07 K/UL (ref 0–0.12)
BILIRUB SERPL-MCNC: 1.9 MG/DL (ref 0.1–1.5)
BILIRUB UR QL STRIP.AUTO: NEGATIVE
BUN SERPL-MCNC: 9 MG/DL (ref 8–22)
CALCIUM SERPL-MCNC: 8.2 MG/DL (ref 8.5–10.5)
CHLORIDE SERPL-SCNC: 101 MMOL/L (ref 96–112)
CO2 SERPL-SCNC: 24 MMOL/L (ref 20–33)
COLOR UR: YELLOW
CREAT SERPL-MCNC: 0.67 MG/DL (ref 0.5–1.4)
EOSINOPHIL # BLD AUTO: 0 K/UL (ref 0–0.51)
EOSINOPHIL NFR BLD: 0 % (ref 0–6.9)
ERYTHROCYTE [DISTWIDTH] IN BLOOD BY AUTOMATED COUNT: 45.7 FL (ref 35.9–50)
EST. AVERAGE GLUCOSE BLD GHB EST-MCNC: 105 MG/DL
GLOBULIN SER CALC-MCNC: 2.7 G/DL (ref 1.9–3.5)
GLUCOSE SERPL-MCNC: 165 MG/DL (ref 65–99)
GLUCOSE UR STRIP.AUTO-MCNC: NEGATIVE MG/DL
HBA1C MFR BLD: 5.3 % (ref 0–5.6)
HCT VFR BLD AUTO: 35.1 % (ref 37–47)
HGB BLD-MCNC: 12.6 G/DL (ref 12–16)
IMM GRANULOCYTES # BLD AUTO: 0.16 K/UL (ref 0–0.11)
IMM GRANULOCYTES NFR BLD AUTO: 0.9 % (ref 0–0.9)
KETONES UR STRIP.AUTO-MCNC: NEGATIVE MG/DL
LACTATE BLD-SCNC: 1.6 MMOL/L (ref 0.5–2)
LEUKOCYTE ESTERASE UR QL STRIP.AUTO: NEGATIVE
LYMPHOCYTES # BLD AUTO: 0.87 K/UL (ref 1–4.8)
LYMPHOCYTES NFR BLD: 5.1 % (ref 22–41)
MAGNESIUM SERPL-MCNC: 1.6 MG/DL (ref 1.5–2.5)
MCH RBC QN AUTO: 33.6 PG (ref 27–33)
MCHC RBC AUTO-ENTMCNC: 35.9 G/DL (ref 33.6–35)
MCV RBC AUTO: 93.6 FL (ref 81.4–97.8)
MICRO URNS: NORMAL
MONOCYTES # BLD AUTO: 0.79 K/UL (ref 0–0.85)
MONOCYTES NFR BLD AUTO: 4.6 % (ref 0–13.4)
NEUTROPHILS # BLD AUTO: 15.26 K/UL (ref 2–7.15)
NEUTROPHILS NFR BLD: 89 % (ref 44–72)
NITRITE UR QL STRIP.AUTO: NEGATIVE
NRBC # BLD AUTO: 0 K/UL
NRBC BLD-RTO: 0 /100 WBC
PH UR STRIP.AUTO: 6 [PH]
PLATELET # BLD AUTO: 175 K/UL (ref 164–446)
PMV BLD AUTO: 9.3 FL (ref 9–12.9)
POTASSIUM SERPL-SCNC: 3.7 MMOL/L (ref 3.6–5.5)
PROCALCITONIN SERPL-MCNC: 2.89 NG/ML
PROT SERPL-MCNC: 6.5 G/DL (ref 6–8.2)
PROT UR QL STRIP: NEGATIVE MG/DL
RBC # BLD AUTO: 3.75 M/UL (ref 4.2–5.4)
RBC UR QL AUTO: NEGATIVE
SODIUM SERPL-SCNC: 133 MMOL/L (ref 135–145)
SP GR UR STRIP.AUTO: 1.01
UROBILINOGEN UR STRIP.AUTO-MCNC: 0.2 MG/DL
WBC # BLD AUTO: 17.2 K/UL (ref 4.8–10.8)

## 2018-02-08 PROCEDURE — 99233 SBSQ HOSP IP/OBS HIGH 50: CPT | Performed by: HOSPITALIST

## 2018-02-08 PROCEDURE — 700102 HCHG RX REV CODE 250 W/ 637 OVERRIDE(OP): Performed by: INTERNAL MEDICINE

## 2018-02-08 PROCEDURE — 770020 HCHG ROOM/CARE - TELE (206)

## 2018-02-08 PROCEDURE — 83735 ASSAY OF MAGNESIUM: CPT

## 2018-02-08 PROCEDURE — A9270 NON-COVERED ITEM OR SERVICE: HCPCS | Performed by: FAMILY MEDICINE

## 2018-02-08 PROCEDURE — A9270 NON-COVERED ITEM OR SERVICE: HCPCS | Performed by: HOSPITALIST

## 2018-02-08 PROCEDURE — A9270 NON-COVERED ITEM OR SERVICE: HCPCS | Performed by: INTERNAL MEDICINE

## 2018-02-08 PROCEDURE — 83036 HEMOGLOBIN GLYCOSYLATED A1C: CPT

## 2018-02-08 PROCEDURE — 83605 ASSAY OF LACTIC ACID: CPT

## 2018-02-08 PROCEDURE — 81003 URINALYSIS AUTO W/O SCOPE: CPT

## 2018-02-08 PROCEDURE — 80053 COMPREHEN METABOLIC PANEL: CPT

## 2018-02-08 PROCEDURE — 84145 PROCALCITONIN (PCT): CPT

## 2018-02-08 PROCEDURE — 700101 HCHG RX REV CODE 250: Performed by: FAMILY MEDICINE

## 2018-02-08 PROCEDURE — 36415 COLL VENOUS BLD VENIPUNCTURE: CPT

## 2018-02-08 PROCEDURE — 700111 HCHG RX REV CODE 636 W/ 250 OVERRIDE (IP): Performed by: HOSPITALIST

## 2018-02-08 PROCEDURE — 700102 HCHG RX REV CODE 250 W/ 637 OVERRIDE(OP): Performed by: HOSPITALIST

## 2018-02-08 PROCEDURE — 700105 HCHG RX REV CODE 258: Performed by: HOSPITALIST

## 2018-02-08 PROCEDURE — 85025 COMPLETE CBC W/AUTO DIFF WBC: CPT

## 2018-02-08 PROCEDURE — 700102 HCHG RX REV CODE 250 W/ 637 OVERRIDE(OP): Performed by: FAMILY MEDICINE

## 2018-02-08 RX ORDER — METOPROLOL TARTRATE 1 MG/ML
5 INJECTION, SOLUTION INTRAVENOUS ONCE
Status: COMPLETED | OUTPATIENT
Start: 2018-02-08 | End: 2018-02-08

## 2018-02-08 RX ORDER — POTASSIUM CHLORIDE 20 MEQ/1
40 TABLET, EXTENDED RELEASE ORAL ONCE
Status: COMPLETED | OUTPATIENT
Start: 2018-02-08 | End: 2018-02-08

## 2018-02-08 RX ORDER — SODIUM CHLORIDE 9 MG/ML
1000 INJECTION, SOLUTION INTRAVENOUS ONCE
Status: COMPLETED | OUTPATIENT
Start: 2018-02-08 | End: 2018-02-08

## 2018-02-08 RX ORDER — FAMOTIDINE 20 MG/1
20 TABLET, FILM COATED ORAL 2 TIMES DAILY
Status: DISCONTINUED | OUTPATIENT
Start: 2018-02-08 | End: 2018-02-11

## 2018-02-08 RX ORDER — MAGNESIUM SULFATE HEPTAHYDRATE 40 MG/ML
2 INJECTION, SOLUTION INTRAVENOUS ONCE
Status: COMPLETED | OUTPATIENT
Start: 2018-02-08 | End: 2018-02-08

## 2018-02-08 RX ORDER — KETOROLAC TROMETHAMINE 10 MG/1
10 TABLET, FILM COATED ORAL EVERY 6 HOURS PRN
Status: DISCONTINUED | OUTPATIENT
Start: 2018-02-08 | End: 2018-02-16 | Stop reason: HOSPADM

## 2018-02-08 RX ORDER — CEFAZOLIN SODIUM 2 G/100ML
2 INJECTION, SOLUTION INTRAVENOUS EVERY 8 HOURS
Status: DISCONTINUED | OUTPATIENT
Start: 2018-02-08 | End: 2018-02-16 | Stop reason: HOSPADM

## 2018-02-08 RX ADMIN — OXYCODONE HYDROCHLORIDE 5 MG: 5 TABLET ORAL at 13:29

## 2018-02-08 RX ADMIN — CYCLOBENZAPRINE 10 MG: 10 TABLET, FILM COATED ORAL at 06:11

## 2018-02-08 RX ADMIN — FAMOTIDINE 20 MG: 20 TABLET, FILM COATED ORAL at 21:26

## 2018-02-08 RX ADMIN — OXYCODONE HYDROCHLORIDE 5 MG: 5 TABLET ORAL at 08:22

## 2018-02-08 RX ADMIN — HEPARIN SODIUM 5000 UNITS: 5000 INJECTION, SOLUTION INTRAVENOUS; SUBCUTANEOUS at 13:29

## 2018-02-08 RX ADMIN — STANDARDIZED SENNA CONCENTRATE AND DOCUSATE SODIUM 2 TABLET: 8.6; 5 TABLET, FILM COATED ORAL at 09:14

## 2018-02-08 RX ADMIN — CEFAZOLIN SODIUM 2 G: 2 INJECTION, SOLUTION INTRAVENOUS at 15:50

## 2018-02-08 RX ADMIN — ACETAMINOPHEN 650 MG: 325 TABLET, FILM COATED ORAL at 00:42

## 2018-02-08 RX ADMIN — STANDARDIZED SENNA CONCENTRATE AND DOCUSATE SODIUM 2 TABLET: 8.6; 5 TABLET, FILM COATED ORAL at 00:01

## 2018-02-08 RX ADMIN — STANDARDIZED SENNA CONCENTRATE AND DOCUSATE SODIUM 2 TABLET: 8.6; 5 TABLET, FILM COATED ORAL at 21:26

## 2018-02-08 RX ADMIN — SODIUM CHLORIDE: 9 INJECTION, SOLUTION INTRAVENOUS at 21:55

## 2018-02-08 RX ADMIN — SODIUM CHLORIDE: 9 INJECTION, SOLUTION INTRAVENOUS at 11:56

## 2018-02-08 RX ADMIN — ACETAMINOPHEN 650 MG: 325 TABLET, FILM COATED ORAL at 08:09

## 2018-02-08 RX ADMIN — METOPROLOL TARTRATE 5 MG: 5 INJECTION, SOLUTION INTRAVENOUS at 01:02

## 2018-02-08 RX ADMIN — SODIUM CHLORIDE: 9 INJECTION, SOLUTION INTRAVENOUS at 15:56

## 2018-02-08 RX ADMIN — SODIUM CHLORIDE 1000 ML: 9 INJECTION, SOLUTION INTRAVENOUS at 09:28

## 2018-02-08 RX ADMIN — HEPARIN SODIUM 5000 UNITS: 5000 INJECTION, SOLUTION INTRAVENOUS; SUBCUTANEOUS at 21:26

## 2018-02-08 RX ADMIN — MAGNESIUM SULFATE IN WATER 2 G: 40 INJECTION, SOLUTION INTRAVENOUS at 09:30

## 2018-02-08 RX ADMIN — OXYCODONE HYDROCHLORIDE 5 MG: 5 TABLET ORAL at 23:53

## 2018-02-08 RX ADMIN — OXYCODONE HYDROCHLORIDE 5 MG: 5 TABLET ORAL at 19:50

## 2018-02-08 RX ADMIN — CEFAZOLIN SODIUM 2 G: 2 INJECTION, SOLUTION INTRAVENOUS at 10:53

## 2018-02-08 RX ADMIN — POTASSIUM CHLORIDE 40 MEQ: 1500 TABLET, EXTENDED RELEASE ORAL at 09:13

## 2018-02-08 RX ADMIN — ACETAMINOPHEN 650 MG: 325 TABLET, FILM COATED ORAL at 15:50

## 2018-02-08 RX ADMIN — CEFAZOLIN SODIUM 2 G: 2 INJECTION, SOLUTION INTRAVENOUS at 21:55

## 2018-02-08 RX ADMIN — KETOROLAC TROMETHAMINE 10 MG: 10 TABLET, FILM COATED ORAL at 15:00

## 2018-02-08 RX ADMIN — ACETAMINOPHEN 650 MG: 325 TABLET, FILM COATED ORAL at 21:54

## 2018-02-08 RX ADMIN — IBUPROFEN 800 MG: 800 TABLET, FILM COATED ORAL at 00:01

## 2018-02-08 ASSESSMENT — ENCOUNTER SYMPTOMS
FEVER: 1
MUSCULOSKELETAL NEGATIVE: 1
WEAKNESS: 1
EYES NEGATIVE: 1
CARDIOVASCULAR NEGATIVE: 1
GASTROINTESTINAL NEGATIVE: 1
RESPIRATORY NEGATIVE: 1

## 2018-02-08 ASSESSMENT — PATIENT HEALTH QUESTIONNAIRE - PHQ9
SUM OF ALL RESPONSES TO PHQ9 QUESTIONS 1 AND 2: 0
2. FEELING DOWN, DEPRESSED, IRRITABLE, OR HOPELESS: NOT AT ALL
1. LITTLE INTEREST OR PLEASURE IN DOING THINGS: NOT AT ALL
SUM OF ALL RESPONSES TO PHQ QUESTIONS 1-9: 0

## 2018-02-08 ASSESSMENT — PAIN SCALES - GENERAL
PAINLEVEL_OUTOF10: 7
PAINLEVEL_OUTOF10: 4
PAINLEVEL_OUTOF10: 7
PAINLEVEL_OUTOF10: 9
PAINLEVEL_OUTOF10: 9
PAINLEVEL_OUTOF10: 7
PAINLEVEL_OUTOF10: 6
PAINLEVEL_OUTOF10: 7
PAINLEVEL_OUTOF10: 7
PAINLEVEL_OUTOF10: 4

## 2018-02-08 ASSESSMENT — LIFESTYLE VARIABLES
EVER_SMOKED: NEVER
ALCOHOL_USE: NO

## 2018-02-08 NOTE — PROGRESS NOTES
Patient heart rate sustaining in the 120s. Called Dr Sarkar and left a message. Paged Dr Richards using pager system. Waiting on call back. Notified Charge Rn Yarely

## 2018-02-08 NOTE — PROGRESS NOTES
"Patient no longer reporting pain in her head, only reporting 4/10 pain in her right arm and neck, PRN flexeril administered per MAR. Patient remains afebrile, without use of cold packs. Patient states \"I am feeling better.\" Family at bedside, updated on POC per patient request.   "

## 2018-02-08 NOTE — CONSULTS
Date of Service:  2/8/2018    PCP: Makeda Jaquez M.D.    CC:  Right shoulder pain    HPI: This is a 57 y.o. Right hand dominant female who presented the day prior to consultation on 2/7 with severe right posterior shoulder pain, radiating to her neck with associated tension-type headaches.  She denies fevers/chills, cough, or associated constitutional symptoms.  She was evaluated in urgent care the day prior at which put shoulder x-rays were obtained and negative for acute process.  She was discharged on a pain regimen including tylenol, muscle relaxants, and steroids.  On presentation she was tachycardic to the 150s.  Extensive workup was obtained including MRI right shoulder, MRI cervical/thoracic/lumbar spine, CTPA, and CT neck, which was negative for shoulder joint effusion or findings concerning for discitis/osteomyelitis/epidural abscess in her spine.  Findings were suggestive of possible myositis involving her chest wall.  She was started on IV zosyn & vancomycin.  Blood cultures are positive for MSSA.    At present evaluation, the patient is in severe pain, states it is 10/10.  Complains of pain on the posterior aspect of the shoulder overlying the trapezius muscle as well as anteriorly into her chest wall.  Pain is exacerbated by movement & palpation.  She also is experiencing a severe tension-type right sided headache.    ROS: As above. The remainder of a complete review of systems is negative in all systems except as noted.    PMHx:  Active Ambulatory Problems     Diagnosis Date Noted   • HTN (hypertension) 07/30/2014   • Foot pain 07/30/2014   • Heel spur 09/03/2014   • Breast cancer screening 04/24/2017     Resolved Ambulatory Problems     Diagnosis Date Noted   • Urinary tract infection symptoms 09/14/2016   • Shoulder blade pain 09/14/2016   • Ear itching 02/08/2017   • Urticaria 04/24/2017     No Additional Past Medical History       SHx:  Social History     Social History   • Marital status: Single      Spouse name: N/A   • Number of children: N/A   • Years of education: N/A     Occupational History   • Not on file.     Social History Main Topics   • Smoking status: Never Smoker   • Smokeless tobacco: Never Used   • Alcohol use No   • Drug use: No   • Sexual activity: No      Comment:       Other Topics Concern   • Not on file     Social History Narrative   • No narrative on file       FHx:  family history is not on file.    Allergies:  No Known Allergies    Medications:  No current facility-administered medications on file prior to encounter.      Current Outpatient Prescriptions on File Prior to Encounter   Medication Sig Dispense Refill   • MethylPREDNISolone (MEDROL DOSEPAK) 4 MG Tablet Therapy Pack Medrol Dosepack, Use as directed 21 Tab 0   • cyclobenzaprine (FLEXERIL) 10 MG Tab Take 1 Tab by mouth 3 times a day as needed. 30 Tab 0   • Diclofenac Sodium (VOLTAREN) 1 % Gel Apply 4 g to skin as directed 3 times a day as needed. 1 Tube 5   • amLODIPine (NORVASC) 5 MG Tab Take 1 Tab by mouth every day. 90 Tab 3       Objective Exam:  Vitals:    02/08/18 0038 02/08/18 0129 02/08/18 0500 02/08/18 0800   BP:  105/74 100/76 121/82   Pulse:  100 91 (!) 111   Resp:  16 18 18   Temp: (!) 38.1 °C (100.6 °F) 37.2 °C (99 °F) 37.7 °C (99.9 °F) 37.8 °C (100 °F)   SpO2:   95% 98%   Weight:       Height:           General: alert, oriented.  Appears distressed & uncomfortable.  HEENT: atraumatic, neck supple, mucous membranes pink & moist.  No neck tenderness to palpation.  Chest: nonlabored breathing.  Mild fullness to the anterior aspect of chest wall overlying the clavicle as well as posteriorly in region of trapezius.  No significant erythema.  No palpable fluctuance.  Exquisitely tender to palpation.  CV: regular rate, rhythm  Abd: soft, nontender  Ext: right upper extremity - minimal pain with passive shoulder ROM.  No tenderness about the shoulder specifically.  No significant swelling.  Flexes, extends elbow,  wrist, digits. Hand warm, well-perfused, palpable radial pulse.  Neuro: Sensation preserved throughout hand.  Skin: intact    Laboratory--reviewed personally and are as follows:  Lab Results   Component Value Date/Time    WBC 17.2 (H) 02/08/2018 02:58 AM    RBC 3.75 (L) 02/08/2018 02:58 AM    HEMOGLOBIN 12.6 02/08/2018 02:58 AM    HEMATOCRIT 35.1 (L) 02/08/2018 02:58 AM    MCV 93.6 02/08/2018 02:58 AM    MCH 33.6 (H) 02/08/2018 02:58 AM    MCHC 35.9 (H) 02/08/2018 02:58 AM    MPV 9.3 02/08/2018 02:58 AM    NEUTSPOLYS 89.00 (H) 02/08/2018 02:58 AM    LYMPHOCYTES 5.10 (L) 02/08/2018 02:58 AM    MONOCYTES 4.60 02/08/2018 02:58 AM    EOSINOPHILS 0.00 02/08/2018 02:58 AM    BASOPHILS 0.40 02/08/2018 02:58 AM      Lab Results   Component Value Date/Time    SODIUM 133 (L) 02/08/2018 02:58 AM    POTASSIUM 3.7 02/08/2018 02:58 AM    CHLORIDE 101 02/08/2018 02:58 AM    CO2 24 02/08/2018 02:58 AM    GLUCOSE 165 (H) 02/08/2018 02:58 AM    BUN 9 02/08/2018 02:58 AM    CREATININE 0.67 02/08/2018 02:58 AM    BUNCREATRAT 19 07/07/2016 07:31 AM      No results found for: PROTHROMBTM, INR     Radiology  R shoulder XR, MRI reviewed.  No acute abnormality.  No significant joint effusion.    Assessment:  Right chest wall & posterior shoulder pain, possibly 2/2 myositis.  Do not suspect septic shoulder at this time given exam findings & no fluid on MRI.    Plan:  - Would continue broad-spectrum IV antibiotics  - Does not require orthopaedic intervention at this time  - Agree with general surgery evaluation for possible chest wall myositis/infectious process  - Will sign off, please re-consult if new concerns

## 2018-02-08 NOTE — ASSESSMENT & PLAN NOTE
With phlegmon, s/p I&D by Dr. Perez yesterday, still with drain in, not draining much  Continue IV antibiotics

## 2018-02-08 NOTE — PROGRESS NOTES
Notified MD of elevated HR in the 130-140's sustaining and MEWS score of 6. New orders received for 5mg Lopressor.

## 2018-02-08 NOTE — CONSULTS
ADULT INFECTIOUS DISEASE CONSULT     Date of Service:2/8/2018    Consult Requested By: Gavin Badillo M.D.    Reason for Consultation: Staph aureus bacteremia    History of Present Illness:   Urszula Persaud is a 57 y.o.  female who has history of hypertension who started having pain in her right shoulder about 5-6 days ago. Pain was exacerbated by palpation and taking deep breaths. Is also pain in her right side of the neck. She went to urgent care on 2/6/2018 and was sent home with the muscle relaxant and a steroid. But the pain got so bad that she came into the emergency room. In the ER she had a WBC count of 18,000. She was also found to have swelling on the right side of her neck. Her blood cultures have come back positive for MSSA. In view of all these issues infectious disease consult has been called. Patient denies any recent boils , travel, trauma or dental work    Review Of Systems:  Gen.-Complains of fevers. Denies any chills.  HEENT- denies any sore throat, headache or vision changes  Pulmonary- denies any cough, shortness of breath  Cardiovascular- denies any chest pain, leg swelling.    GI- denies any nausea vomiting diarrhea or abdominal pain  Musculoskeletal- complains of right shoulder and neck area pain  Neuro- denies any weakness or sensory change  Psych- denies any depression or suicidal ideation  Genitourinary- denies any frequency or dysuria        PMH:   History reviewed. No pertinent past medical history.      PSH:  Past Surgical History:   Procedure Laterality Date   • VEIN LIGATION  2006       FAMILY HX:  Family History   Problem Relation Age of Onset   • Diabetes Neg Hx    • Cancer Neg Hx        SOCIAL HX:  Social History     Social History   • Marital status: Single     Spouse name: N/A   • Number of children: N/A   • Years of education: N/A     Occupational History   • Not on file.     Social History Main Topics   • Smoking status: Never Smoker   • Smokeless tobacco: Never Used    • Alcohol use No   • Drug use: No   • Sexual activity: No      Comment:       Other Topics Concern   • Not on file     Social History Narrative   • No narrative on file     History   Smoking Status   • Never Smoker   Smokeless Tobacco   • Never Used     History   Alcohol Use No       Allergies/Intolerances:  No Known Allergies    History reviewed with the patient    Other Current Medications:    Current Facility-Administered Medications:   •  ceFAZolin (ANCEF) IVPB 2 g, 2 g, Intravenous, Q8HRS, Gavin Badillo M.D., 2 g at 02/08/18 1550  •  ketorolac (TORADOL) tablet 10 mg, 10 mg, Oral, Q6HRS PRN, Analilia Sarkar M.D., 10 mg at 02/08/18 1500  •  cyclobenzaprine (FLEXERIL) tablet 10 mg, 10 mg, Oral, TID PRN, Michele Yu M.D., 10 mg at 02/08/18 0611  •  NS infusion 1,986 mL, 30 mL/kg, Intravenous, Once PRN, Michele Yu M.D.  •  senna-docusate (PERICOLACE or SENOKOT S) 8.6-50 MG per tablet 2 Tab, 2 Tab, Oral, BID, 2 Tab at 02/08/18 0914 **AND** polyethylene glycol/lytes (MIRALAX) PACKET 1 Packet, 1 Packet, Oral, QDAY PRN **AND** magnesium hydroxide (MILK OF MAGNESIA) suspension 30 mL, 30 mL, Oral, QDAY PRN **AND** bisacodyl (DULCOLAX) suppository 10 mg, 10 mg, Rectal, QDAY PRN, Michele Yu M.D.  •  Respiratory Care per Protocol, , Nebulization, Continuous RT, Michele Yu M.D.  •  NS infusion, , Intravenous, Continuous, Michele Yu M.D., Last Rate: 150 mL/hr at 02/08/18 1156  •  NS (BOLUS) infusion 1,000 mL, 1,000 mL, Intravenous, Once PRN, Michele Yu M.D.  •  heparin injection 5,000 Units, 5,000 Units, Subcutaneous, Q8HRS, Michele Yu M.D., 5,000 Units at 02/08/18 1329  •  acetaminophen (TYLENOL) tablet 650 mg, 650 mg, Oral, Q6HRS PRN, Michele Yu M.D., 650 mg at 02/08/18 1550  •  Notify provider if pain remains uncontrolled, , , CONTINUOUS **AND** Use the numeric rating scale (NRS-11) on regular floors and Critical-Care Pain Observation Tool (CPOT)  "on ICUs/Trauma to assess pain, , , CONTINUOUS **AND** Pulse Ox (Oximetry), , , CONTINUOUS **AND** Pharmacy Consult Request ...Pain Management Review, , Other, PRN **AND** If patient difficult to arouse and/or has respiratory depression, stop any opiates that are currently infusing and call a Rapid Response., , , CONTINUOUS **AND** oxyCODONE immediate-release (ROXICODONE) tablet 2.5 mg, 2.5 mg, Oral, Q3HRS PRN **AND** oxyCODONE immediate-release (ROXICODONE) tablet 5 mg, 5 mg, Oral, Q3HRS PRN, 5 mg at 18 1329 **AND** HYDROmorphone (DILAUDID) injection 0.25 mg, 0.25 mg, Intravenous, Q3HRS PRN, Michele Yu M.D.  [unfilled]    Most Recent Vital Signs:  /75   Pulse (!) 109   Temp 38 °C (100.4 °F)   Resp 18   Ht 1.549 m (5' 1\")   Wt 68.8 kg (151 lb 10.8 oz)   SpO2 95%   Breastfeeding? No   BMI 28.66 kg/m²   Temp  Av.2 °C (100.7 °F)  Min: 36.5 °C (97.7 °F)  Max: 40.3 °C (104.6 °F)    Physical Exam:  General: Looks ill  HEENT: sclera anicteric, PERRL, EOMI, MMM, no oral lesions  Neck: supple, no lymphadenopathy  Chest: Some crackles at the right base. There is swelling around the upper clavicular area, erythematous and tender to touch  Cardiac: Regular, no murmurs no gallops heard  Abdomen: + bowel sounds, soft, non-tender, non-distended, no HSM  Extremities: Right shoulder tender to touch. Range of motion decreased  Skin: no rashes   Neuro: Alert and oriented times 3, non-focal exam    Pertinent Lab Results:  Recent Labs      18   1545  18   0258   WBC  18.4*  17.2*      Recent Labs      18   1545  18   0258   HEMOGLOBIN  13.3  12.6   HEMATOCRIT  37.4  35.1*   MCV  93.7  93.6   MCH  33.3*  33.6*   PLATELETCT  187  175         Recent Labs      18   1545  18   0258   SODIUM  129*  133*   POTASSIUM  3.5*  3.7   CHLORIDE  94*  101   CO2  23  24   CREATININE  0.65  0.67        Recent Labs      18   1545  18   0258   ALBUMIN  4.1  3.8    " "    Pertinent Micro:  Results     Procedure Component Value Units Date/Time    BLOOD CULTURE [[id number]]  (Abnormal) Collected:  02/07/18 1848    Order Status:  Completed Specimen:  Blood from Peripheral Updated:  02/08/18 0850     Significant Indicator POS (POS)     Source BLD     Site PERIPHERAL     Blood Culture -- (A)     Growth detected by Bactec instrument.  02/08/2018  08:50  Gram Stain: Gram positive cocci: Possible Staphylococcus sp.      Narrative:       CALL  [place]  171 tel. [phone],  CALLED  171 tel. [phone] 02/08/2018, 08:50, RB PERF. RESULTS CALLED  TO:[id number] and DILAN  1 of 2 for Blood Culture x 2 sites order. Per Hospital  Policy: Only change Specimen Src: to \"Line\" if specified by  physician order.    BLOOD CULTURE [[id number]]  (Abnormal) Collected:  02/07/18 1848    Order Status:  Completed Specimen:  Blood from Peripheral Updated:  02/08/18 0849     Significant Indicator POS (POS)     Source BLD     Site PERIPHERAL     Blood Culture -- (A)     Growth detected by Bactec instrument.  02/08/2018  08:43  Staphylococcus aureus (methicillin sensitive)  detected by PCR.  Susceptibility to follow.      Narrative:       CALL  [facility]  171 tel. [phone],  CALLED  171 tel. [phone] 02/08/2018, 08:49, RB PERF. RESULTS CALLED  TO:[id number] and DILAN  2 of 2 blood culture x2  Sites order. Per Hospital Policy:  Only change Specimen Src: to \"Line\" if specified by physician  order.    Urinalysis [[id number]] Collected:  02/08/18 0539    Order Status:  Completed Specimen:  Urine from Urine, Clean Catch Updated:  02/08/18 0557     Color Yellow     Character Clear     Specific Gravity 1.012     Ph 6.0     Glucose Negative mg/dL      Ketones Negative mg/dL      Protein Negative mg/dL      Bilirubin Negative     Urobilinogen, Urine 0.2     Nitrite Negative     Leukocyte Esterase Negative     Occult Blood Negative     Micro Urine Req see below     Comment: Microscopic examination not performed when specimen is clear  and " chemically negative for protein, blood, leukocyte esterase  and nitrite.         Narrative:       If not done within the last 24 hours    Culture Respiratory W/ GRM STN [[id number]]     Order Status:  Completed Specimen:  Respirate from Sputum     Blood Culture [[id number]]     Order Status:  Canceled Specimen:  Blood from Peripheral     Blood Culture [[id number]]     Order Status:  Canceled Specimen:  Blood from Peripheral     Culture Respiratory W/ GRM STN [[id number]]     Order Status:  Canceled Specimen:  Respirate from Sputum     Blood Culture [[id number]] Collected:  02/07/18 0000    Order Status:  Canceled Specimen:  Other from Peripheral     Blood Culture [[id number]] Collected:  02/07/18 0000    Order Status:  Canceled Specimen:  Other from Peripheral         Blood Culture   Date Value Ref Range Status   02/07/2018 (A)  Preliminary    Growth detected by Bactec instrument.  02/08/2018  08:50  Gram Stain: Gram positive cocci: Possible Staphylococcus sp.     02/07/2018 (A)  Preliminary    Growth detected by Bactec instrument.  02/08/2018  08:43  Staphylococcus aureus (methicillin sensitive)  detected by PCR.  Susceptibility to follow.          Studies:  Ct-soft Tissue Neck With    Result Date: 2/7/2018 2/7/2018 5:01 PM HISTORY/REASON FOR EXAM: Constant right shoulder pain. TECHNIQUE/EXAM DESCRIPTION AND NUMBER OF VIEWS:  CT soft tissue neck with contrast. The study was performed on a helical multidetector CT scanner. Contiguous thin section helical images were obtained of the neck from the skull base through the thoracic inlet. 80 mL of Omnipaque 350 nonionic contrast was injected intravenously. Low dose optimization technique was utilized for this CT exam including automated exposure control and adjustment of the mA and/or kV according to patient size. COMPARISON: None. FINDINGS: There are no mass lesions or fluid collections in the deep or superficial soft tissues of the neck. There is no abnormal enhancement.  Cervical lymph nodes show normal size and configuration. There is no pathologic adenopathy evident. Vascular enhancement  of the cervical carotid and vertebral arteries and internal jugular veins appears intact. The nasopharynx, oropharynx, and hypopharynx show normal airways and no abnormal soft tissue swelling. The larynx and trachea are unremarkable. The prevertebral soft tissues appear intact. The parapharyngeal spaces show normal symmetry and fat planes. The pterygopalatine and infratemporal fossae appear intact. The tongue and floor of the mouth are unremarkable. The submandibular, sublingual, and submental spaces appear intact. The parotid and submandibular glands show normal size and density. No abnormal calcifications are evident. The thyroid gland it is enlarged and appears homogeneous. The remaining structures at the thoracic inlet and superior mediastinum within the field of view are unremarkable. There is minimal posterior disc space narrowing and posterior osteophytic spurring at C5-6 and C6-7. No intracranial abnormalities are evident.     1.  No acute inflammatory process identified in the soft tissues of the neck. 2.  Thyroid gland enlargement. 3.  Minor degenerative changes posteriorly at C5-6 and C6-7.    Dx-shoulder 2+ Right    Result Date: 2/6/2018 2/6/2018 9:05 AM HISTORY/REASON FOR EXAM:  Atraumatic Pain/Swelling/Deformity Acute pain of right shoulder after GLF 2 days ago TECHNIQUE/EXAM DESCRIPTION AND NUMBER OF VIEWS:  3 views of the RIGHT shoulder. COMPARISON: None FINDINGS: No acute fracture or dislocation. Mild osteoarthritis of the AC joint and glenohumeral joint     1. No acute osseous abnormality.    Mr-cervical Spine-with & W/o    Result Date: 2/8/2018 2/7/2018 7:03 PM HISTORY/REASON FOR EXAM:  Atraumatic pain, paresis. Right shoulder pain, headache. TECHNIQUE/EXAM DESCRIPTION: MRI of the cervical spine without and with contrast. The study was performed on a [employer] 1.5 Aubrie MRI  scanner. T1 sagittal, T2 fast spin-echo sagittal, T1 postcontrast fat-suppressed sagittal, and gradient-echo axial images were obtained of the cervical spine. 15 mL Omniscan contrast were administered  intravenously. COMPARISON:  None. FINDINGS:  There is minimal grade 1 retrolisthesis of and C5 on 6. There is mild and vertebral disc space narrowing at C5-6. The disk spaces are well-maintained and have a normal appearance. The cervical cord has a normal caliber, course and signal intensity. There is no definite evidence of abnormal enhancement in the cervical cord or spine. Level specific findings as follows: C2-C3: No significant central canal or neural foraminal stenosis. C3-C4: No significant central canal or neural foraminal stenosis. C4-C5: There is mild posterior midline disc bulge which effaces anterior thecal sac. There is borderline central canal stenosis. C5-C6: There is mild broad posterior disc bulge and endplate spurring. There is mild ligamentum flavum hypertrophy. There is mild central canal stenosis. There is mild to moderate right and moderate left neural foraminal stenosis secondary to endplate spurring and facet arthropathy. C6-C7: There is mild broad posterior disc bulge. There is mild ligamentum flavum hypertrophy. There is mild central canal stenosis. There is moderate left neural foraminal stenosis secondary to disc bulge and facet arthropathy. C7-T1: No significant central canal or neural foraminal stenosis. The visualized prevertebral and posterior paraspinous soft tissues are grossly unremarkable.     1.  No acute abnormality or abnormal enhancement in the cervical spine. If there is strong clinical suspicion for discitis/osteomyelitis or epidural infectious process, short interval repeat imaging is recommended. 2.  Multilevel degenerative changes of the cervical spine as described above.    Mr-lumbar Spine-with & W/o    Result Date: 2/8/2018 2/7/2018 7:03 PM HISTORY/REASON FOR EXAM:   Atraumatic back pain. TECHNIQUE/EXAM DESCRIPTION: MRI of the lumbar spine without and with contrast. The study was performed on a [employer] Signa 1.5 Aubrie MRI scanner. T1 sagittal, T2 fast spin-echo sagittal, and T2 axial images were obtained of the lumbar spine. T1 postcontrast fat-suppressed sagittal images were obtained. 15 mL Omniscan contrast was administered intravenously. COMPARISON:  None. FINDINGS: Marrow signal intensity is normal. There is no abnormal enhancement. Vertebral body heights are preserved. Vertebral alignment is normal. There is mild intervertebral disc space narrowing at L5-S1 with decrease of the normal T2 signal intensity within the disc space consistent with mild degenerative disc desiccation. The spinal cord demonstrates normal course and caliber. There is no abnormal cord enhancement. Conus terminates at the T12/L1 level. Level-specific findings as follows: L5-S1: There is mild broad posterior disc bulge. There is mild facet arthropathy. There is mild bilateral neural foraminal stenosis. L4-5: There is mild diffuse disc bulge. There is minimal bilateral neural foraminal stenosis. L3-4: No significant central canal or neural foraminal stenosis. L2-3: No significant central canal or neural foraminal stenosis. L1-2: No significant central canal or neural foraminal stenosis. The visualized prevertebral and posterior paraspinous soft tissues are grossly unremarkable.     1.  No acute abnormality or abnormal enhancement in the lumbar spine. If there is strong clinical suspicion for discitis/osteomyelitis or epidural infectious process, short interval repeat imaging is recommended. 2.  Multilevel degenerative changes of the lumbar spine as described above.    Mr-shoulder-w/o Right    Addendum Date: 2/8/2018    I further discussed this case with Dr. Badillo at 4:20 PM on 2/8/2018. The patient is exquisitely tender over the medial clavicle. That area is not included on this study. There is however  likely some edema and induration of the soft tissues surrounding the mid and medial clavicle at the edge of the images. Review of CT scan of the neck and chest from the same date demonstrates induration of the soft tissues surrounding the right medial clavicle as well as centered in the area of the sternoclavicular joint. These findings would be suspicious for septic arthritis/osteomyelitis of the right sternoclavicular joint.    Result Date: 2/8/2018 2/7/2018 7:03 PM HISTORY/REASON FOR EXAM: Right-sided shoulder pain TECHNIQUE/EXAM DESCRIPTION: MRI of the RIGHT shoulder without contrast. Using a [employer] Signa 1.5 Aubrie MRI scanner, T1 sagittal, fast spin-echo T2 fat-suppressed oblique coronal, sagittal, and axial and intermediate fast spin-echo oblique coronal images were obtained. COMPARISON: None. FINDINGS: Osseous acromial outlet: The acromion demonstrates a curved undersurface. There is no lateral downsloping. There is no evidence of an inferiorly directed subacromial enthesophyte. There is mild degenerative change of the acromioclavicular joint. There are no inferiorly directed osteophytes. There is minimal fluid seen within the subacromial/subdeltoid bursa. Rotator cuff: There is tendinopathy involving the supraspinatus tendon with mild fraying of the bursal surface fibers. No evidence of full-thickness tear or retraction. Glenoid labrum: There is no definite superior labral tear or evidence of disruption of the biceps anchor. There is a multiloculated fluid signal focus seen anterior to the subscapularis tendon which measures 2.4 x 1.4 x 1.1 cm in size. This extends towards the anterior/superior labrum. The anterior/inferior and posterior/inferior glenoid labrum are intact. Osseous structures/Cartilaginous surfaces: No evidence of marrow edema.  Cartilaginous surfaces are well maintained. Miscellaneous: No significant joint effusion. The long head of the biceps tendon is appropriately situated within the  bicipital groove.     1.  Mild tendinopathy of the supraspinatus tendon with very mild fraying of the bursal surface fibers. No evidence of full-thickness tear or retraction. 2.  No definite tear of the glenoid labrum. There is however a multiloculated fluid signal focus present anterior to the subscapularis tendon which extends towards the anterior/superior labrum. This may represent a ganglion cyst but can also potential he  represent a paralabral cyst related to nonvisualized superior labral tear. 3.  Mild degenerative change of the acromioclavicular joint.    Mr-thoracic Spine-with & W/o    Result Date: 2/8/2018 2/7/2018 7:03 PM HISTORY/REASON FOR EXAM: Atraumatic back and neck pain. TECHNIQUE/EXAM DESCRIPTION: MRI of the thoracic spine without and with contrast. The study was performed on a [employer] Signa 1.5 Aubrie MRI scanner. T1 sagittal, T1 postcontrast fat-suppressed sagittal, T2 sagittal, and T2 axial images were obtained of the thoracic spine. 15 mL Omniscan contrast were administered intravenously. COMPARISON:  None FINDINGS: The thoracic vertebral bodies have a normal height and alignment. The thoracic disks have a normal appearance and signal intensity. The thoracic cord has a normal caliber, course and signal intensity. There is no evidence of abnormal enhancement in the thoracic cord or spine. There is no evidence of disk extrusion, cord compression, central canal stenosis, or neural foraminal stenosis. There is atelectasis and/or consolidation in the visualized portion of the right lung base.     1.  No acute abnormality or abnormal enhancement in the thoracic spine. If there is strong clinical suspicion for discitis/osteomyelitis or epidural infectious process, short interval repeat imaging is recommended. 2.  Possible right lung base pneumonitis.    Ct-cta Chest Pulmonary Artery W/ Recons    Result Date: 2/7/2018 2/7/2018 5:01 PM HISTORY/REASON FOR EXAM:  Chest pain. TECHNIQUE/EXAM DESCRIPTION: CT  angiogram scan for pulmonary embolism with contrast, with reconstructions. 1.25 mm helical sections were obtained from the lung apices through the lung bases following the rapid bolus administration of 80 mL of Omnipaque 350 nonionic contrast. Thin-section overlapping reconstruction interval was utilized. Coronal reconstructions were generated from the axial data. MIP post processing was performed and utilized for the interpretation. Low dose optimization technique was utilized for this CT exam including automated exposure control and adjustment of the mA and/or kV according to patient size. COMPARISON: None FINDINGS: Pulmonary Embolism: No. Main Pulmonary Arteries: No. Segmental branches: No. Subsegmental branches: No. Additional Comments: The heart is normal in size and configuration. Lungs: There is minimal atelectasis or pneumonia in the posterior aspect of the right lower lobe and minimally in the anterior inferior aspect of the lingula. The remainder of the lung parenchyma is clear. Pleura: No pleural effusion. Nodes: No enlarged lymph nodes. Additional findings: There is mild enlargement of both adrenal glands. No hydronephrosis is visualized     1.  No evidence of pulmonary embolism. 2.  Minimal atelectasis or pneumonia in the right lower lobe and in the lingula. 3.  Mild bilateral adrenal gland enlargement. Significance unknown.   IMPRESSION:     Staph aureus bacteremia  Possible septic arthritis of the right shoulder  Developing abscess clavicular region      PLAN:   Urszula Persaud is a 57 y.o.  female with history of hypertension with MSSA sepsis  Continue with [facility]  Orthopedic consultation for possible clavicular area abscess as well as tapping of the shoulder joint  Check echocardiogram, sedimentation rate and CRP  Continue with the supportive measures  Have reviewed all the records      Discussed with IM. Will continue to follow    Brandi Trent M.D.

## 2018-02-08 NOTE — H&P
Hospital Medicine History and Physical    Date of Service  2/7/2018    Chief Complaint  Chief Complaint   Patient presents with   • Shoulder Pain       History of Presenting Illness  57 y.o. female with a past medical history of Hypertension, presented 2/7/2018 with evaluation for severe right shoulder pain which started yesterday, patient reports discomfort as a tension type of discomfort around hair right side of her shoulder and trapezius area exacerbated by inspiration. Patient was seen yesterday in urgent care had shoulder x-rays which were negative has patient was prescribed steroids as well as the muscle relaxants however despite this medical regimen her symptoms only got worse. She denies having productive cough, denies hemoptysis. Upon today's evaluation ER patient underwent a CTA PE exam which did not show any evidence of pulmonary embolisms however did show evidence of minimal atelectasis versus pneumonitis in the right lower lobe and the lingula. However, given that the patient was tachycardic in the 150s and appeared very sick I was concerned for a possible epidural abscess versus osteomyelitis, and given to the degree of her leukocytosis a stat MRI of her cervical lumbar and thoracic spine with and without contrast were ordered. At this present time ER P discussed case with radiology and per the wet read, no apparent osteomyelitis or discitis were noted however patient apparently has abnormality around her chest wall for concern about possible fasciitis. ERP consulted orthopedics and general surgery,awaiting further recommendations has been started on IV vancomycin and IV Zosyn for the time being.          Primary Care Physician  Makeda Jaquez M.D.    Consultants   Surgery   Orthopaedics    Code Status  Code: Full code    Review of Systems  Review of Systems   Constitutional: Positive for malaise/fatigue. Negative for chills and fever.   HENT: Negative for congestion, hearing  loss, sore throat and tinnitus.    Eyes: Negative for blurred vision, double vision, photophobia and pain.   Respiratory: Negative for cough, hemoptysis, sputum production, shortness of breath and stridor.    Cardiovascular: Negative for chest pain, palpitations, orthopnea, claudication and PND.   Gastrointestinal: Negative for blood in stool, constipation, heartburn, melena, nausea and vomiting.   Genitourinary: Negative for dysuria, frequency and urgency.   Musculoskeletal: Positive for myalgias and neck pain. Negative for back pain.   Neurological: Positive for weakness. Negative for dizziness, tingling, tremors, sensory change, speech change and headaches.   Psychiatric/Behavioral: Negative for depression, memory loss and suicidal ideas. The patient is nervous/anxious.           Past Medical History  No past medical history on file.    Surgical History  Past Surgical History:   Procedure Laterality Date   • VEIN LIGATION         Medications  No current facility-administered medications on file prior to encounter.      Current Outpatient Prescriptions on File Prior to Encounter   Medication Sig Dispense Refill   • MethylPREDNISolone (MEDROL DOSEPAK) 4 MG Tablet Therapy Pack Medrol Dosepack, Use as directed 21 Tab 0   • cyclobenzaprine (FLEXERIL) 10 MG Tab Take 1 Tab by mouth 3 times a day as needed. 30 Tab 0   • Diclofenac Sodium (VOLTAREN) 1 % Gel Apply 4 g to skin as directed 3 times a day as needed. 1 Tube 5   • amLODIPine (NORVASC) 5 MG Tab Take 1 Tab by mouth every day. 90 Tab 3       Family History  Family History   Problem Relation Age of Onset   • Diabetes Neg Hx    • Cancer Neg Hx        Social History  Social History   Substance Use Topics   • Smoking status: Never Smoker   • Smokeless tobacco: Never Used   • Alcohol use No       Allergies  No Known Allergies     Physical Exam  Laboratory   Hemodynamics  Temp (24hrs), Av.2 °C (100.8 °F), Min:37.4 °C (99.3 °F), Max:39 °C (102.2 °F)   Temperature:  (!) 39 °C (102.2 °F)  Pulse  Av.8  Min: 126  Max: 158 Heart Rate (Monitored): (!) 136  Blood Pressure: 148/98, NIBP: 132/97      Respiratory      Respiration: 14, Pulse Oximetry: 94 %             Physical Exam   Constitutional: She is oriented to person, place, and time. She appears well-developed and well-nourished. She appears distressed (due to right shoulder/neck pain).   HENT:   Head: Normocephalic and atraumatic.   Mouth/Throat: No oropharyngeal exudate.   Eyes: Conjunctivae are normal. Pupils are equal, round, and reactive to light. Right eye exhibits no discharge. No scleral icterus.   Neck: Neck supple. No JVD present. No thyromegaly present.   Cardiovascular: Intact distal pulses.    No murmur heard.  Tachycardic, regular   Pulmonary/Chest: Effort normal and breath sounds normal. No stridor. No respiratory distress. She has no wheezes. She has no rales.   Abdominal: Soft. Bowel sounds are normal. She exhibits no distension. There is no tenderness. There is no rebound.   Musculoskeletal: Normal range of motion. She exhibits no edema.   Pain on direct palpation over her right shoulder, trapezius area, and right chest area., no erythema, no discharge. No nuchal rigidity   Neurological: She is alert and oriented to person, place, and time. No cranial nerve deficit.   Strength 5 out of 5 upper and lower extremities, no sensory loss   Skin: Skin is warm. She is not diaphoretic. No erythema.   Psychiatric: Thought content normal.   Anxious          Assessment/Plan  * Sepsis (CMS-[facility])- (present on admission)   Assessment & Plan    This is sepsis (without associated acute organ dysfunction).   Possibly secondary to chest wall abscess, this is per wet read information relayed to ERP from radiology.  MRI of her cervical, thoracic, lumbar spine with and without contrast apparently does not show any evidence of epidural abscesses or discitis  Blood cultures taken and pending  IV vancomycin and IV Zosyn  started  Orthopedics and general surgery consulted by govind FAGAN for recommendations        CAP (community acquired pneumonia)- (present on admission)   Assessment & Plan    Possible finding on the chest x-ray  Patient is on IV Zosyn which will be sufficient to cover common respiratory organisms.  Continue RT protocol, duo nebs, Pep therapy if warranted, and incentive spirometry.           Hyponatremia- (present on admission)   Assessment & Plan    Continue IV fluids recheck BMP in the morning        Neck pain- (present on admission)   Assessment & Plan    As above possibly due to fasciitis around her neck and chest wall.  Continue IV antibiotics awaiting surgical recommendations        Lactic acidosis- (present on admission)   Assessment & Plan    As a result of sepsis continue IV antibiotics continue to trend lactic gases every 3 hours until normalization.        Hypokalemia- (present on admission)   Assessment & Plan    Replenish electrolytes recheck BMP in the morning            I anticipate this patient will require at least two midnights for appropriate medical management, necessitating inpatient admission.    Prophylaxis: sc heparin    Recent Labs      02/07/18   1545   WBC  18.4*   RBC  3.99*   HEMOGLOBIN  13.3   HEMATOCRIT  37.4   MCV  93.7   MCH  33.3*   MCHC  35.6*   RDW  45.1   PLATELETCT  187   MPV  9.7     Recent Labs      02/07/18   1545   SODIUM  129*   POTASSIUM  3.5*   CHLORIDE  94*   CO2  23   GLUCOSE  184*   BUN  10   CREATININE  0.65   CALCIUM  8.9     Recent Labs      02/07/18   1545   ALTSGPT  53*   ASTSGOT  34   ALKPHOSPHAT  66   TBILIRUBIN  1.1   GLUCOSE  184*         Recent Labs      02/07/18   1545   BNPBTYPENAT  60         Lab Results   Component Value Date    TROPONINI <0.01 02/07/2018       Imaging  CT-SOFT TISSUE NECK WITH   Final Result      1.  No acute inflammatory process identified in the soft tissues of the neck.      2.  Thyroid gland enlargement.      3.  Minor degenerative  changes posteriorly at C5-6 and C6-7.      CT-CTA CHEST PULMONARY ARTERY W/ RECONS   Final Result      1.  No evidence of pulmonary embolism.      2.  Minimal atelectasis or pneumonia in the right lower lobe and in the lingula.      3.  Mild bilateral adrenal gland enlargement. Significance unknown.            MR-CERVICAL SPINE-WITH & W/O    (Results Pending)   MR-LUMBAR SPINE-WITH & W/O    (Results Pending)   MR-THORACIC SPINE-WITH & W/O    (Results Pending)   MR-SHOULDER-W/O RIGHT    (Results Pending)

## 2018-02-08 NOTE — ED NOTES
Med rec completed per pt and daughter at bedside  Allergies reviewed - NKDA  No ABX in last 30 days   Pt started a Medrol Dosepak yesterday

## 2018-02-08 NOTE — CARE PLAN
Bedside report received, pt care assumed, tele box on. Pt aaox4, no signs of distress noted at this time. POC discussed with pt and verbalizes no questions. Pt reports 6/10 pain in her right shoulder, neck and head, repositioned patient and applied a cold pack. Pt denies any additional needs at this time. Bed in lowest position, pt educated on fall risk and verbalized understanding, call light within reach, hourly rounding in place.

## 2018-02-08 NOTE — PROGRESS NOTES
2 RN Skin check complete:   Patient has some scratches on upper left extremity. No open sores/wounds found.

## 2018-02-08 NOTE — CARE PLAN
Problem: Communication  Goal: The ability to communicate needs accurately and effectively will improve  Outcome: PROGRESSING AS EXPECTED    Intervention: [place] patient and significant other/support system to call light to alert staff of needs   02/08/18 0528   OTHER   Oriented to: All of the Following : Location of Bathroom, Visiting Policy, Unit Routine, Call Light and Bedside Controls, Bedside Rail Policy, Smoking Policy, Rights and Responsibilities, Bedside Report, and Patient Education Notebook         Problem: Safety  Goal: Will remain free from falls  Outcome: PROGRESSING AS EXPECTED    Intervention: Assess risk factors for falls  Fall risk assessed, fall precautions in place, pt verbalizes understanding of fall risk.

## 2018-02-08 NOTE — PROGRESS NOTES
Yarely castillo RN took call from Lab called with critical result of 2 positive blood cultures gram positive cocci stap aureus at 0850. Critical lab result read back to Yarely.   Dr. Sarkar notified of critical lab result at 0850.  Critical lab result read back by Dr. Sarkar.

## 2018-02-08 NOTE — ASSESSMENT & PLAN NOTE
This was sepsis (without associated acute organ dysfunction).   Hx of tooth infection in last 3 months  MRI of her cervical, thoracic, lumbar spine with and without contrast apparently did  NOT show any evidence of epidural abscesses  Resolved lactic acid   Likely 2/2 cellulitis/deep neck infection    Blood culture: MSSA on 2/7  Repeat blood culture on 2/9: MSSA  Day 5 cefazolin (started on 2/8/18)  Drainage on 2/10  Echo on 2/9 no signs of endocarditis;  WES:  Tdone on 2/13/18 neg for endocarditis

## 2018-02-08 NOTE — PROGRESS NOTES
"Pharmacy Kinetics 57 y.o. female on vancomycin day # 1 2018    Currently on Vancomycin New Start   Other antibiotics: Zosyn continuous infusion (10.125 g)     Indication for Treatment: Empiric - unknown source of infection     Pertinent history per medical record: Admitted on 2018 for evaluation of constant/severe shoulder pain.  CT chest with small RLL pneumonia; however, all other imaging negative.  Patient had MRI of cervical/lumbar/throacic spine & shoulder - results are pending.  Given leukocytosis, lactic acidosis and fever (102.2 degrees Farenheit), broad spectrum empiric antibiotics initiated.      Allergies: Patient has no known allergies.     List concerns for renal function: IV contrast (18)    Pertinent cultures to date:   18 - Peripheral BC x 2: in process    Recent Labs      18   1545   WBC  18.4*   NEUTSPOLYS  87.50*     Recent Labs      18   1545   BUN  10   CREATININE  0.65   ALBUMIN  4.1     No results for input(s): VANCOTROUGH, VANCOPEAK, VANCORANDOM in the last 72 hours.No intake or output data in the 24 hours ending 18 2132   Blood pressure 148/98, pulse (!) 136, temperature (!) 39 °C (102.2 °F), resp. rate 14, height 1.549 m (5' 1\"), weight 66.2 kg (146 lb), SpO2 94 %. Temp (24hrs), Av.2 °C (100.8 °F), Min:37.4 °C (99.3 °F), Max:39 °C (102.2 °F)      A/P   1. Vancomycin dose change: 1700 mg IV x 1, followed by 1100 mg IV q12h (1100/2300)  2. Next vancomycin level:  at 1030, prior to 4th dose (not ordered)  3. Goal trough: 16-20 mcg/mL   4. Comments: Patient started on broad spectrum antibiotics for infection of unknown source.  Patient has been complaining of severe shoulder pain - though imaging at this time has not revealed this to be the source.  MRI results are pending.  Only concern for accumulation is recent IV contrast.  Will start patient on 17 mg/kg q12h and plan for steady state level as above or sooner if decline in renal function.  Please " follow-up with MRI findings and adjust goal trough as indicated.      Roro Cruz, PharmD, BCPS

## 2018-02-09 ENCOUNTER — APPOINTMENT (OUTPATIENT)
Dept: RADIOLOGY | Facility: MEDICAL CENTER | Age: 58
DRG: 853 | End: 2018-02-09
Attending: HOSPITALIST
Payer: COMMERCIAL

## 2018-02-09 LAB
ALBUMIN SERPL BCP-MCNC: 2.8 G/DL (ref 3.2–4.9)
ALBUMIN/GLOB SERPL: 1.2 G/DL
ALP SERPL-CCNC: 53 U/L (ref 30–99)
ALT SERPL-CCNC: 30 U/L (ref 2–50)
ANION GAP SERPL CALC-SCNC: 8 MMOL/L (ref 0–11.9)
AST SERPL-CCNC: 19 U/L (ref 12–45)
BASOPHILS # BLD AUTO: 1.7 % (ref 0–1.8)
BASOPHILS # BLD: 0.14 K/UL (ref 0–0.12)
BILIRUB SERPL-MCNC: 3.6 MG/DL (ref 0.1–1.5)
BUN SERPL-MCNC: 10 MG/DL (ref 8–22)
CALCIUM SERPL-MCNC: 7.7 MG/DL (ref 8.5–10.5)
CHLORIDE SERPL-SCNC: 105 MMOL/L (ref 96–112)
CO2 SERPL-SCNC: 20 MMOL/L (ref 20–33)
CREAT SERPL-MCNC: 0.6 MG/DL (ref 0.5–1.4)
CRP SERPL HS-MCNC: 15.66 MG/DL (ref 0–0.75)
EOSINOPHIL # BLD AUTO: 0 K/UL (ref 0–0.51)
EOSINOPHIL NFR BLD: 0 % (ref 0–6.9)
ERYTHROCYTE [DISTWIDTH] IN BLOOD BY AUTOMATED COUNT: 47.2 FL (ref 35.9–50)
ERYTHROCYTE [SEDIMENTATION RATE] IN BLOOD BY WESTERGREN METHOD: 7 MM/HOUR (ref 0–30)
GLOBULIN SER CALC-MCNC: 2.4 G/DL (ref 1.9–3.5)
GLUCOSE SERPL-MCNC: 151 MG/DL (ref 65–99)
HCT VFR BLD AUTO: 30.8 % (ref 37–47)
HGB BLD-MCNC: 11 G/DL (ref 12–16)
LV EJECT FRACT  99904: 60
LV EJECT FRACT MOD 2C 99903: 56.95
LV EJECT FRACT MOD 4C 99902: 63.08
LV EJECT FRACT MOD BP 99901: 60.57
LYMPHOCYTES # BLD AUTO: 0.58 K/UL (ref 1–4.8)
LYMPHOCYTES NFR BLD: 6.9 % (ref 22–41)
MAGNESIUM SERPL-MCNC: 1.8 MG/DL (ref 1.5–2.5)
MANUAL DIFF BLD: ABNORMAL
MCH RBC QN AUTO: 33.5 PG (ref 27–33)
MCHC RBC AUTO-ENTMCNC: 35.7 G/DL (ref 33.6–35)
MCV RBC AUTO: 93.9 FL (ref 81.4–97.8)
METAMYELOCYTES NFR BLD MANUAL: 0.9 %
MONOCYTES # BLD AUTO: 0.29 K/UL (ref 0–0.85)
MONOCYTES NFR BLD AUTO: 3.4 % (ref 0–13.4)
MORPHOLOGY BLD-IMP: NORMAL
NEUTROPHILS # BLD AUTO: 7.32 K/UL (ref 2–7.15)
NEUTROPHILS NFR BLD: 59.5 % (ref 44–72)
NEUTS BAND NFR BLD MANUAL: 27.6 % (ref 0–10)
NRBC # BLD AUTO: 0 K/UL
NRBC BLD-RTO: 0 /100 WBC
OVALOCYTES BLD QL SMEAR: NORMAL
PLATELET # BLD AUTO: 142 K/UL (ref 164–446)
PLATELET BLD QL SMEAR: NORMAL
PMV BLD AUTO: 9.6 FL (ref 9–12.9)
POIKILOCYTOSIS BLD QL SMEAR: NORMAL
POTASSIUM SERPL-SCNC: 3.8 MMOL/L (ref 3.6–5.5)
PROCALCITONIN SERPL-MCNC: 3.46 NG/ML
PROT SERPL-MCNC: 5.2 G/DL (ref 6–8.2)
RBC # BLD AUTO: 3.28 M/UL (ref 4.2–5.4)
RBC BLD AUTO: PRESENT
SODIUM SERPL-SCNC: 133 MMOL/L (ref 135–145)
WBC # BLD AUTO: 8.4 K/UL (ref 4.8–10.8)

## 2018-02-09 PROCEDURE — 770020 HCHG ROOM/CARE - TELE (206)

## 2018-02-09 PROCEDURE — 700111 HCHG RX REV CODE 636 W/ 250 OVERRIDE (IP): Performed by: HOSPITALIST

## 2018-02-09 PROCEDURE — 700102 HCHG RX REV CODE 250 W/ 637 OVERRIDE(OP): Performed by: FAMILY MEDICINE

## 2018-02-09 PROCEDURE — 85007 BL SMEAR W/DIFF WBC COUNT: CPT

## 2018-02-09 PROCEDURE — 85652 RBC SED RATE AUTOMATED: CPT

## 2018-02-09 PROCEDURE — 80053 COMPREHEN METABOLIC PANEL: CPT

## 2018-02-09 PROCEDURE — 86140 C-REACTIVE PROTEIN: CPT

## 2018-02-09 PROCEDURE — 99233 SBSQ HOSP IP/OBS HIGH 50: CPT | Performed by: HOSPITALIST

## 2018-02-09 PROCEDURE — 87150 DNA/RNA AMPLIFIED PROBE: CPT

## 2018-02-09 PROCEDURE — A9270 NON-COVERED ITEM OR SERVICE: HCPCS | Performed by: FAMILY MEDICINE

## 2018-02-09 PROCEDURE — 71045 X-RAY EXAM CHEST 1 VIEW: CPT

## 2018-02-09 PROCEDURE — 700105 HCHG RX REV CODE 258: Performed by: HOSPITALIST

## 2018-02-09 PROCEDURE — 87077 CULTURE AEROBIC IDENTIFY: CPT

## 2018-02-09 PROCEDURE — 87040 BLOOD CULTURE FOR BACTERIA: CPT

## 2018-02-09 PROCEDURE — A9270 NON-COVERED ITEM OR SERVICE: HCPCS | Performed by: INTERNAL MEDICINE

## 2018-02-09 PROCEDURE — A9270 NON-COVERED ITEM OR SERVICE: HCPCS | Performed by: HOSPITALIST

## 2018-02-09 PROCEDURE — 700102 HCHG RX REV CODE 250 W/ 637 OVERRIDE(OP): Performed by: INTERNAL MEDICINE

## 2018-02-09 PROCEDURE — 85027 COMPLETE CBC AUTOMATED: CPT

## 2018-02-09 PROCEDURE — 93306 TTE W/DOPPLER COMPLETE: CPT | Mod: 26 | Performed by: INTERNAL MEDICINE

## 2018-02-09 PROCEDURE — 84145 PROCALCITONIN (PCT): CPT

## 2018-02-09 PROCEDURE — 700102 HCHG RX REV CODE 250 W/ 637 OVERRIDE(OP): Performed by: HOSPITALIST

## 2018-02-09 PROCEDURE — 83735 ASSAY OF MAGNESIUM: CPT

## 2018-02-09 PROCEDURE — 93306 TTE W/DOPPLER COMPLETE: CPT

## 2018-02-09 RX ORDER — IBUPROFEN 600 MG/1
600 TABLET ORAL ONCE
Status: COMPLETED | OUTPATIENT
Start: 2018-02-09 | End: 2018-02-09

## 2018-02-09 RX ADMIN — ACETAMINOPHEN 650 MG: 325 TABLET, FILM COATED ORAL at 06:00

## 2018-02-09 RX ADMIN — FAMOTIDINE 20 MG: 20 TABLET, FILM COATED ORAL at 22:43

## 2018-02-09 RX ADMIN — OXYCODONE HYDROCHLORIDE 5 MG: 5 TABLET ORAL at 22:43

## 2018-02-09 RX ADMIN — HYDROMORPHONE HYDROCHLORIDE 0.25 MG: 2 INJECTION INTRAMUSCULAR; INTRAVENOUS; SUBCUTANEOUS at 02:01

## 2018-02-09 RX ADMIN — OXYCODONE HYDROCHLORIDE 5 MG: 5 TABLET ORAL at 18:06

## 2018-02-09 RX ADMIN — SODIUM CHLORIDE: 9 INJECTION, SOLUTION INTRAVENOUS at 05:04

## 2018-02-09 RX ADMIN — ACETAMINOPHEN 650 MG: 325 TABLET, FILM COATED ORAL at 16:16

## 2018-02-09 RX ADMIN — KETOROLAC TROMETHAMINE 10 MG: 10 TABLET, FILM COATED ORAL at 13:50

## 2018-02-09 RX ADMIN — CEFAZOLIN SODIUM 2 G: 2 INJECTION, SOLUTION INTRAVENOUS at 06:00

## 2018-02-09 RX ADMIN — FAMOTIDINE 20 MG: 20 TABLET, FILM COATED ORAL at 08:43

## 2018-02-09 RX ADMIN — HEPARIN SODIUM 5000 UNITS: 5000 INJECTION, SOLUTION INTRAVENOUS; SUBCUTANEOUS at 05:59

## 2018-02-09 RX ADMIN — STANDARDIZED SENNA CONCENTRATE AND DOCUSATE SODIUM 2 TABLET: 8.6; 5 TABLET, FILM COATED ORAL at 08:43

## 2018-02-09 RX ADMIN — HEPARIN SODIUM 5000 UNITS: 5000 INJECTION, SOLUTION INTRAVENOUS; SUBCUTANEOUS at 22:43

## 2018-02-09 RX ADMIN — CEFAZOLIN SODIUM 2 G: 2 INJECTION, SOLUTION INTRAVENOUS at 14:26

## 2018-02-09 RX ADMIN — CEFAZOLIN SODIUM 2 G: 2 INJECTION, SOLUTION INTRAVENOUS at 22:43

## 2018-02-09 RX ADMIN — IBUPROFEN 600 MG: 600 TABLET, FILM COATED ORAL at 01:01

## 2018-02-09 ASSESSMENT — ENCOUNTER SYMPTOMS
MYALGIAS: 1
EYES NEGATIVE: 1
WEAKNESS: 1
RESPIRATORY NEGATIVE: 1
FEVER: 0
NECK PAIN: 1
SHORTNESS OF BREATH: 0
VOMITING: 0
CARDIOVASCULAR NEGATIVE: 1
GASTROINTESTINAL NEGATIVE: 1
SENSORY CHANGE: 0
COUGH: 0
ABDOMINAL PAIN: 0
NAUSEA: 0
CHILLS: 1
SPEECH CHANGE: 0
FEVER: 1

## 2018-02-09 ASSESSMENT — PAIN SCALES - GENERAL
PAINLEVEL_OUTOF10: 6
PAINLEVEL_OUTOF10: 7
PAINLEVEL_OUTOF10: 8
PAINLEVEL_OUTOF10: 9
PAINLEVEL_OUTOF10: 7
PAINLEVEL_OUTOF10: 8
PAINLEVEL_OUTOF10: 8

## 2018-02-09 ASSESSMENT — LIFESTYLE VARIABLES: EVER_SMOKED: NEVER

## 2018-02-09 ASSESSMENT — COPD QUESTIONNAIRES
DO YOU EVER COUGH UP ANY MUCUS OR PHLEGM?: NO/ONLY WITH OCCASIONAL COLDS OR INFECTIONS
HAVE YOU SMOKED AT LEAST 100 CIGARETTES IN YOUR ENTIRE LIFE: NO/DON'T KNOW
COPD SCREENING SCORE: 1
DURING THE PAST 4 WEEKS HOW MUCH DID YOU FEEL SHORT OF BREATH: NONE/LITTLE OF THE TIME

## 2018-02-09 NOTE — CONSULTS
Surgical Consultation    Date: 2/8/2018    Requesting Physician: Dr. Palomino  PCP: Makeda Jaquez M.D.  Attending Physician: Gavin Gong M.D.    CC: Right shoulder pain    HPI: This is a 57 y.o. female who is presenting right shoulder pain that this started 5 days ago after the patient woke from sleep. She was initially seen at urgent care where she was given muscle relaxants and steroids, but her symptoms worsen. She was admitted on February 7, 2018 and reportedly was tachycardic and ill-appearing. She had a leukocytosis and there was initial concern for epidural abscess so multiple MRI scans were performed.  She was found to have MSSA bacteremia and has been on antibiotics. Multiple scans have been performed and orthopedic surgery was consulted for evaluation of her shoulder. I was called today for concern for inflammation around the clavicle and beneath the sternoclavicular joint on CT. In speaking with the patient, she reports feeling better than on admission. She still has pain in her shoulder and points to her acromion process. She denies fever, chills, nausea, vomiting, chest pain, shortness of breath, abdominal pain, obstipation, hematemesis, hematochezia, melena, neurologic symptoms, headache. She denies toothache.    Past medical history:  -Arthritis  -Hypertension    Past Surgical History:   Procedure Laterality Date   • VEIN LIGATION  2006       Current Facility-Administered Medications   Medication Dose Route Frequency Provider Last Rate Last Dose   • ceFAZolin (ANCEF) IVPB 2 g  2 g Intravenous Q8HRS Gavin Badillo M.D.   2 g at 02/08/18 1550   • ketorolac (TORADOL) tablet 10 mg  10 mg Oral Q6HRS PRN Analilia Sarkar M.D.   10 mg at 02/08/18 1500   • famotidine (PEPCID) tablet 20 mg  20 mg Oral BID Analilia Sarkar M.D.   20 mg at 02/08/18 2126   • cyclobenzaprine (FLEXERIL) tablet 10 mg  10 mg Oral TID PRN Michele Yu M.D.   10 mg at 02/08/18 0611   • NS infusion 1,986 mL  30 mL/kg  Intravenous Once PRN Michele Yu M.D.       • senna-docusate (PERICOLACE or SENOKOT S) 8.6-50 MG per tablet 2 Tab  2 Tab Oral BID Michele Yu M.D.   2 Tab at 02/08/18 2126    And   • polyethylene glycol/lytes (MIRALAX) PACKET 1 Packet  1 Packet Oral QDAY PRN Michele Yu M.D.        And   • magnesium hydroxide (MILK OF MAGNESIA) suspension 30 mL  30 mL Oral QDAY PRN Michele Yu M.D.        And   • bisacodyl (DULCOLAX) suppository 10 mg  10 mg Rectal QDAY PRN Michele Yu M.D.       • Respiratory Care per Protocol   Nebulization Continuous RT Michele Yu M.D.       • NS infusion   Intravenous Continuous Michele Yu M.D. 150 mL/hr at 02/08/18 1556     • NS (BOLUS) infusion 1,000 mL  1,000 mL Intravenous Once PRN Michele Yu M.D.       • heparin injection 5,000 Units  5,000 Units Subcutaneous Q8HRS Michele Yu M.D.   5,000 Units at 02/08/18 2126   • acetaminophen (TYLENOL) tablet 650 mg  650 mg Oral Q6HRS PRN Michele Yu M.D.   650 mg at 02/08/18 1550   • Pharmacy Consult Request ...Pain Management Review   Other PRN Michele Yu M.D.        And   • oxyCODONE immediate-release (ROXICODONE) tablet 2.5 mg  2.5 mg Oral Q3HRS PRN Michele Yu M.D.        And   • oxyCODONE immediate-release (ROXICODONE) tablet 5 mg  5 mg Oral Q3HRS PRN Michele Yu M.D.   5 mg at 02/08/18 1950    And   • HYDROmorphone (DILAUDID) injection 0.25 mg  0.25 mg Intravenous Q3HRS PRN Michele Yu M.D.           Social History     Social History   • Marital status: Single     Spouse name: N/A   • Number of children: N/A   • Years of education: N/A     Occupational History   • Not on file.     Social History Main Topics   • Smoking status: Never Smoker   • Smokeless tobacco: Never Used   • Alcohol use No   • Drug use: No   • Sexual activity: No      Comment:       Other Topics Concern   • Not on file     Social History Narrative   • No narrative on  "file       Family History   Problem Relation Age of Onset   • Diabetes Neg Hx    • Cancer Neg Hx        Allergies:  Patient has no known allergies.    Review of Systems:  Negative except as noted above in the HPI on 10 point review    Physical Exam:  Blood pressure 104/71, pulse (!) 105, temperature 37.6 °C (99.7 °F), resp. rate 18, height 1.549 m (5' 1\"), weight 74.3 kg (163 lb 12.8 oz), SpO2 93 %, not currently breastfeeding.    Constitutional: she is oriented to person, place, and time.  she appears well-developed and well-nourished. No distress.   Head: Normocephalic and atraumatic.   Neck: Normal range of motion. Neck supple. No JVD present. No tracheal deviation present. No thyromegaly present.   Extremities: Warm and perfused  Cardiovascular: Normal rate, regular rhythm, normal heart sounds and intact distal pulses.  Exam reveals no gallop and no friction rub.  No murmur heard.  Pulmonary/Chest: Effort normal and breath sounds normal. No stridor. No respiratory distress. she has no wheezes.  Abdominal: Soft, nontender, nondistended. Bowel sounds are normal. There is no rebound and no guarding.   Musculoskeletal: The patient has tenderness to palpation above the right acromion process which extends medially to the base of the neck. There is mild associated erythema and fullness, but no fluctuance. The pain and erythema did not extend medially towards the sternoclavicular joint and there is no tenderness over the right sternoclavicular joint or medial clavicle  Neurological: she is alert and oriented to person, place, and time. she has normal reflexes. No cranial nerve deficit. Coordination normal.   Skin: Skin is warm and dry. No rash noted. she is not diaphoretic. No erythema. No pallor.   Psychiatric: she has a normal mood and affect.  Behavior is normal.       Labs:  Recent Labs      02/07/18   1545  02/08/18   0258   WBC  18.4*  17.2*   RBC  3.99*  3.75*   HEMOGLOBIN  13.3  12.6   HEMATOCRIT  37.4  35.1* "   MCV  93.7  93.6   MCH  33.3*  33.6*   MCHC  35.6*  35.9*   RDW  45.1  45.7   PLATELETCT  187  175   MPV  9.7  9.3     Recent Labs      02/07/18   1545  02/08/18   0258   SODIUM  129*  133*   POTASSIUM  3.5*  3.7   CHLORIDE  94*  101   CO2  23  24   GLUCOSE  184*  165*   BUN  10  9   CREATININE  0.65  0.67   CALCIUM  8.9  8.2*         Recent Labs      02/07/18   1545  02/08/18   0258   ASTSGOT  34  25   ALTSGPT  53*  49   TBILIRUBIN  1.1  1.9*   ALKPHOSPHAT  66  50   GLOBULIN  3.2  2.7       Radiology:  MR-SHOULDER-W/O RIGHT   Final Result   Addendum 1 of 1   I further discussed this case with Dr. Badillo at 4:20 PM on 2/8/2018. The    patient is exquisitely tender over the medial clavicle. That area is not    included on this study. There is however likely some edema and induration    of the soft tissues surrounding    the mid and medial clavicle at the edge of the images. Review of CT scan    of the neck and chest from the same date demonstrates induration of the    soft tissues surrounding the right medial clavicle as well as centered in    the area of the sternoclavicular    joint. These findings would be suspicious for septic    arthritis/osteomyelitis of the right sternoclavicular joint.      Final      1.  Mild tendinopathy of the supraspinatus tendon with very mild fraying of the bursal surface fibers. No evidence of full-thickness tear or retraction.      2.  No definite tear of the glenoid labrum. There is however a multiloculated fluid signal focus present anterior to the subscapularis tendon which extends towards the anterior/superior labrum. This may represent a ganglion cyst but can also potential he    represent a paralabral cyst related to nonvisualized superior labral tear.      3.  Mild degenerative change of the acromioclavicular joint.      MR-LUMBAR SPINE-WITH & W/O   Final Result      1.  No acute abnormality or abnormal enhancement in the lumbar spine. If there is strong clinical suspicion  for discitis/osteomyelitis or epidural infectious process, short interval repeat imaging is recommended.   2.  Multilevel degenerative changes of the lumbar spine as described above.      MR-THORACIC SPINE-WITH & W/O   Final Result      1.  No acute abnormality or abnormal enhancement in the thoracic spine. If there is strong clinical suspicion for discitis/osteomyelitis or epidural infectious process, short interval repeat imaging is recommended.   2.  Possible right lung base pneumonitis.      MR-CERVICAL SPINE-WITH & W/O   Final Result      1.  No acute abnormality or abnormal enhancement in the cervical spine. If there is strong clinical suspicion for discitis/osteomyelitis or epidural infectious process, short interval repeat imaging is recommended.   2.  Multilevel degenerative changes of the cervical spine as described above.      CT-SOFT TISSUE NECK WITH   Final Result      1.  No acute inflammatory process identified in the soft tissues of the neck.      2.  Thyroid gland enlargement.      3.  Minor degenerative changes posteriorly at C5-6 and C6-7.      CT-CTA CHEST PULMONARY ARTERY W/ RECONS   Final Result      1.  No evidence of pulmonary embolism.      2.  Minimal atelectasis or pneumonia in the right lower lobe and in the lingula.      3.  Mild bilateral adrenal gland enlargement. Significance unknown.            MR-SHOULDER-WITH RIGHT    (Results Pending)   ECHOCARDIOGRAM COMP W/O CONT    (Results Pending)   IR-CONSULT AND TREAT    (Results Pending)   DX-CHEST-PORTABLE (1 VIEW)    (Results Pending)       Assessment: This is a 57 y.o. with MSSA bacteremia and right shoulder pain and inflammation of unknown etiology. Based on my exam, I am not suspicious of a sternoclavicular joint infection. Her pain seems to be limited to the shoulder. Her leukocytosis has improved, but has not resolved. She is currently hemodynamically stable.  She has no abdominal pain. She has no toothache. UA was  negative.      Recommendations:   -Recommend echocardiogram to look for endocarditis   -Continue antibiotics  -May need to reimage the right shoulder area at some point, however this is out of my area of expertise. There is no imaging or clinical evidence of mediastinitis at this time.  -Please contact with any further questions, concerns, updates.    Thank you very much for this consultation.    Gavin Gong M.D.  [facility]  [phone]

## 2018-02-09 NOTE — PROGRESS NOTES
Patient alert and oriented and able to make needs known. Ambulating in the room to and from bathroom. Using incentive spirometer every hour with assistance. Pain well controlled with po medications. IVF running. ID doctor came to see patient. Dr Palomino called this nurse and asked to page on call gen surgery to his cell phone, Paged Dr Gong to [place] Call light in reach. Will continue to monitor.

## 2018-02-09 NOTE — CARE PLAN
Problem: Bowel/Gastric:  Goal: Will not experience complications related to bowel motility  Outcome: PROGRESSING AS EXPECTED  Discussed maintaining PO intake, regular ambulation, taking stool softeners to maintain regular bowel movements while on narcotics. Pt verbalized understanding.     Problem: Knowledge Deficit  Goal: Knowledge of disease process/condition, treatment plan, diagnostic tests, and medications will improve  Outcome: PROGRESSING AS EXPECTED  Discussed POC, tests with pt and family members. All questions answered at this time.

## 2018-02-09 NOTE — PROGRESS NOTES
[facility] Internal Medicine Interval Note    Name Urszula Persaud       1960   Age/Sex 57 y.o. female   MRN [id number]   Code Status FULL        Chief complaint/ reason for interval visit (Primary Diagnosis)   57 year old female with hx of HTN came with neck pain we found cellulitis and bacteremia MSSA    Interval Problem Daily Status Update    - Images did not show abscess.  - Ortho: no septic joint and no intervention needed.   - BC: MSSA switch vanco and zosyn to cefazolin.  - improving in lactic acidosis.   - She had tachycardia treated with IV fluid.   - Echo to r/o endocarditis   - General surgery was consulted.       Review of Systems   Constitutional: Positive for fever.   HENT: Negative.    Eyes: Negative.    Respiratory: Negative.    Cardiovascular: Negative.    Gastrointestinal: Negative.    Genitourinary: Negative.    Musculoskeletal: Negative.    Skin: Positive for rash.   Neurological: Positive for weakness.       Consultants/Specialty  Ortho     Disposition  Home    Quality Measures    Reviewed items::  Labs reviewed, EKG reviewed, Medications reviewed and Radiology images reviewed  Soliz catheter::  No Soliz  DVT prophylaxis pharmacological::  Heparin          Physical Exam       Vitals:    18 1152 18 1220 18 1535 18 1700   BP: (!) 97/69 112/77 104/75    Pulse: (!) 115 (!) 111 (!) 109    Resp: 18  18    Temp: 36.9 °C (98.5 °F)  38 °C (100.4 °F) 36.8 °C (98.3 °F)   SpO2: 92%  95%    Weight:       Height:         Body mass index is 28.66 kg/m². Weight: 68.8 kg (151 lb 10.8 oz)  Oxygen Therapy:  Pulse Oximetry: 95 %, O2 (LPM): 2, O2 Delivery: Silicone Nasal Cannula    Physical Exam   Constitutional: She is oriented to person, place, and time and well-developed, well-nourished, and in no distress. No distress.   HENT:   Head: Normocephalic.   Redness swelling and warm skin on the R side of lower neck.    Neck: No JVD present. No tracheal deviation  present. No thyromegaly present.   Cardiovascular: Normal rate.    No murmur heard.  Pulmonary/Chest: No respiratory distress. She has no wheezes.   Abdominal: Soft. She exhibits no distension. There is no tenderness. There is no rebound.   Neurological: She is alert and oriented to person, place, and time. No cranial nerve deficit. Coordination normal.         Lab Data Review:      2/8/2018  4:07 PM    Recent Labs      02/07/18   1545  02/08/18   0258   SODIUM  129*  133*   POTASSIUM  3.5*  3.7   CHLORIDE  94*  101   CO2  23  24   BUN  10  9   CREATININE  0.65  0.67   MAGNESIUM   --   1.6   CALCIUM  8.9  8.2*       Recent Labs      02/07/18   1545  02/08/18   0258   ALTSGPT  53*  49   ASTSGOT  34  25   ALKPHOSPHAT  66  50   TBILIRUBIN  1.1  1.9*   GLUCOSE  184*  165*       Recent Labs      02/07/18   1545  02/08/18   0258   RBC  3.99*  3.75*   HEMOGLOBIN  13.3  12.6   HEMATOCRIT  37.4  35.1*   PLATELETCT  187  175       Recent Labs      02/07/18   1545  02/08/18   0258   WBC  18.4*  17.2*   NEUTSPOLYS  87.50*  89.00*   LYMPHOCYTES  3.40*  5.10*   MONOCYTES  7.70  4.60   EOSINOPHILS  0.00  0.00   BASOPHILS  0.10  0.40   ASTSGOT  34  25   ALTSGPT  53*  49   ALKPHOSPHAT  66  50   TBILIRUBIN  1.1  1.9*           Assessment/Plan     * Sepsis (CMS-HCC)- (present on admission)   Assessment & Plan    This is sepsis (without associated acute organ dysfunction).   MRI of her cervical, thoracic, lumbar spine with and without contrast apparently did  NOT show any evidence of epidural abscesses or discitis.  Elevated procalcitonin and lactic acid.   Likely secondary to cellulitis   Ortho: no joint infection.   Blood culture: MSSA   Switch IV vancomycin and IV Zosyn to cefazolin   IV fluid  Follow up and evaluate with new images if needed.   The patient does not have diabetes.   Ortho on board, appreciated orth rec's         CAP (community acquired pneumonia)- (present on admission)   Assessment & Plan    Possible finding on the  chest x-ray  No symptoms   Could be atelectasis   Incentive spirometry   Follow up.         Hyponatremia- (present on admission)   Assessment & Plan    Continue IV fluids recheck BMP in the morning        Neck pain- (present on admission)   Assessment & Plan    Likely cellulitis   Continue IV antibiotics  reevaluate with new images if needed.         Lactic acidosis- (present on admission)   Assessment & Plan    As a result of sepsis continue IV antibiotics continue to trend lactic until normalization.  Improved.         Hypokalemia- (present on admission)   Assessment & Plan    Replenish electrolytes recheck BMP in the morning

## 2018-02-09 NOTE — PROGRESS NOTES
Pharmacy called about 0.25 mg dose of dilaudid not showing up in [facility]. Per Etienne in pharmacy, Pharmacist will change order and medication should be available.

## 2018-02-09 NOTE — PROGRESS NOTES
Pt resting in bed with even, visible chest rise. Call light within reach. Will continue to monitor.

## 2018-02-09 NOTE — PROGRESS NOTES
[facility] [place] [facility] Medicine Interval Note    Name Urszula Persaud       1960   Age/Sex 57 y.o. female   MRN [id number]   Code Status FULL        Chief complaint/ reason for interval visit (Primary Diagnosis)   57 year old female with hx of HTN came with neck pain we found cellulitis deep  and bacteremia MSSA    Interval Problem Daily Status Update    - evaluated by ortho and general surgey no intervention needed at this time, appreciate their rec's   - Improving on WBC and fever and tachycardia.   - improving on pain.   - Continue cefazolin   - Echo: no signs of  endocarditis   - WES is pending   - R pleural effussion: follow up with new CXR and tap if needed.   - repeat blood culture       Review of Systems   Constitutional: Negative for fever.   HENT: Negative.    Eyes: Negative.    Respiratory: Negative.    Cardiovascular: Negative.    Gastrointestinal: Negative.    Genitourinary: Negative.    Musculoskeletal: Positive for neck pain.   Skin: Positive for rash.   Neurological: Positive for weakness.       Consultants/Specialty  Ortho     Disposition  Home    Quality Measures    Reviewed items::  Labs reviewed, EKG reviewed, Medications reviewed and Radiology images reviewed  Soliz catheter::  No Soliz  DVT prophylaxis pharmacological::  Heparin          Physical Exam       Vitals:    18 0346 18 0704 18 1130 18 1530   BP: 115/80 118/82 121/83 134/89   Pulse: (!) 104 (!) 101 98 (!) 101   Resp:    Temp: 37.6 °C (99.7 °F) 37.1 °C (98.7 °F) 37.3 °C (99.2 °F) 37.7 °C (99.9 °F)   SpO2: 93% 96% 97% 96%   Weight:       Height:         Body mass index is 30.95 kg/m². Weight: 74.3 kg (163 lb 12.8 oz)  Oxygen Therapy:  Pulse Oximetry: 96 %, O2 (LPM): 2, O2 Delivery: Silicone Nasal Cannula    Physical Exam   Constitutional: She is oriented to person, place, and time and well-developed, well-nourished, and in no  distress. No distress.   HENT:   Head: Normocephalic.   Redness swelling and warm skin on the R side of lower neck.    Neck: No JVD present. No tracheal deviation present. No thyromegaly present.   Cardiovascular: Normal rate.    No murmur heard.  Pulmonary/Chest: No respiratory distress. She has no wheezes.   Abdominal: Soft. She exhibits no distension. There is no tenderness. There is no rebound.   Neurological: She is alert and oriented to person, place, and time. No cranial nerve deficit. Coordination normal.         Lab Data Review:      2/8/2018  4:07 PM    Recent Labs      02/07/18   1545  02/08/18   0258  02/09/18   0201   SODIUM  129*  133*  133*   POTASSIUM  3.5*  3.7  3.8   CHLORIDE  94*  101  105   CO2  23  24  20   BUN  10  9  10   CREATININE  0.65  0.67  0.60   MAGNESIUM   --   1.6  1.8   CALCIUM  8.9  8.2*  7.7*       Recent Labs      02/07/18   1545  02/08/18   0258  02/09/18   0201   ALTSGPT  53*  49  30   ASTSGOT  34  25  19   ALKPHOSPHAT  66  50  53   TBILIRUBIN  1.1  1.9*  3.6*   GLUCOSE  184*  165*  151*       Recent Labs      02/07/18   1545  02/08/18   0258  02/09/18   0201   RBC  3.99*  3.75*  3.28*   HEMOGLOBIN  13.3  12.6  11.0*   HEMATOCRIT  37.4  35.1*  30.8*   PLATELETCT  187  175  142*       Recent Labs      02/07/18   1545  02/08/18   0258  02/09/18   0201   WBC  18.4*  17.2*  8.4   NEUTSPOLYS  87.50*  89.00*  59.50   LYMPHOCYTES  3.40*  5.10*  6.90*   MONOCYTES  7.70  4.60  3.40   EOSINOPHILS  0.00  0.00  0.00   BASOPHILS  0.10  0.40  1.70   ASTSGOT  34  25  19   ALTSGPT  53*  49  30   ALKPHOSPHAT  66  50  53   TBILIRUBIN  1.1  1.9*  3.6*           Assessment/Plan     * Sepsis (CMS-HCC)- (present on admission)   Assessment & Plan    This is sepsis (without associated acute organ dysfunction).   Hx of tooth infection in last 3 months  MRI of her cervical, thoracic, lumbar spine with and without contrast apparently did  NOT show any evidence of epidural abscesses.   improving on  lactic  acid.   Likely secondary to cellulitis/deep neck infection    Ortho: no joint infection.   Blood culture: MSSA on 2/7  Switch IV vancomycin and IV Zosyn to cefazolin on 2/8  Day 2 cefazolin   Echo on 2/9 no signs of endocarditis.   Repeat blood culture.  WES  Follow up and evaluate with new images if needed.   Ortho, infectious disease and general surgery on board, appreciated  rec's   We will follow up so closely to r/o any deep abscess.           CAP (community acquired pneumonia)- (present on admission)   Assessment & Plan    Possible finding on the chest x-ray  No symptoms   Could be atelectasis   Incentive spirometry   Repeat CXR tomorrow and thoracentesis  if needed.   Follow up.         Hyponatremia- (present on admission)   Assessment & Plan    Continue IV fluids recheck BMP in the morning        Neck pain- (present on admission)   Assessment & Plan    Likely cellulitis   Continue IV antibiotics  reevaluate with new images if needed.         Lactic acidosis- (present on admission)   Assessment & Plan    As a result of sepsis continue IV antibiotics continue to trend lactic until normalization.  Improved.         Hypokalemia- (present on admission)   Assessment & Plan    Replenish electrolytes recheck BMP in the morning

## 2018-02-09 NOTE — PROGRESS NOTES
Infectious Disease Progress Note    Author: Brandi Trent M.D. Date & Time of service: 2018  2:55 PM    Chief Complaint:  Staph aureus bacteremia    Interval History:  2018-MAXIMUM TEMPERATURE 103.8 wbc 8.4 platelets 142 creatinine 0.6 says she feels slightly better  Labs Reviewed, Medications Reviewed and Radiology Reviewed.    Review of Systems:  Review of Systems   Constitutional: Positive for chills, fever and malaise/fatigue.   HENT: Negative for hearing loss.    Respiratory: Negative for cough and shortness of breath.    Cardiovascular: Negative for chest pain and leg swelling.   Gastrointestinal: Negative for abdominal pain, nausea and vomiting.   Genitourinary: Negative for dysuria.   Musculoskeletal: Positive for myalgias.   Neurological: Negative for sensory change and speech change.       Hemodynamics:  Temp (24hrs), Av.9 °C (100.3 °F), Min:36.8 °C (98.3 °F), Max:39.9 °C (103.8 °F)  Temperature: 37.3 °C (99.2 °F)  Pulse  Av.6  Min: 91  Max: 158  Blood Pressure: 121/83       Physical Exam:  Physical Exam   Constitutional: She is oriented to person, place, and time. No distress.   Looks ill   HENT:   Mouth/Throat: No oropharyngeal exudate.   Eyes: No scleral icterus.   Neck: Neck supple.   Cardiovascular: Regular rhythm.    No murmur heard.  Pulmonary/Chest: She has no rales.   Area of erythema right shoulder and clavicle area extending to the chest sparing the shoulder joint itself   Musculoskeletal:   Right shoulder today and less tender and moving   Neurological: She is alert and oriented to person, place, and time.   Vitals reviewed.      Meds:    Current Facility-Administered Medications:   •  ceFAZolin  •  ketorolac  •  famotidine  •  cyclobenzaprine  •  NS  •  senna-docusate **AND** polyethylene glycol/lytes **AND** magnesium hydroxide **AND** bisacodyl  •  Respiratory Care per Protocol  •  NS  •  heparin  •  acetaminophen  •  Notify provider if pain remains uncontrolled **AND**  Use the numeric rating scale (NRS-11) on regular floors and Critical-Care Pain Observation Tool (CPOT) on ICUs/Trauma to assess pain **AND** Pulse Ox (Oximetry) **AND** Pharmacy Consult Request **AND** If patient difficult to arouse and/or has respiratory depression, stop any opiates that are currently infusing and call a Rapid Response. **AND** oxyCODONE immediate-release **AND** oxyCODONE immediate-release **AND** HYDROmorphone    Labs:  Recent Labs      02/07/18   1545  02/08/18   0258  02/09/18   0201   WBC  18.4*  17.2*  8.4   RBC  3.99*  3.75*  3.28*   HEMOGLOBIN  13.3  12.6  11.0*   HEMATOCRIT  37.4  35.1*  30.8*   MCV  93.7  93.6  93.9   MCH  33.3*  33.6*  33.5*   RDW  45.1  45.7  47.2   PLATELETCT  187  175  142*   MPV  9.7  9.3  9.6   NEUTSPOLYS  87.50*  89.00*  59.50   LYMPHOCYTES  3.40*  5.10*  6.90*   MONOCYTES  7.70  4.60  3.40   EOSINOPHILS  0.00  0.00  0.00   BASOPHILS  0.10  0.40  1.70   RBCMORPHOLO   --    --   Present     Recent Labs      02/07/18   1545  02/08/18   0258  02/09/18   0201   SODIUM  129*  133*  133*   POTASSIUM  3.5*  3.7  3.8   CHLORIDE  94*  101  105   CO2  23  24  20   GLUCOSE  184*  165*  151*   BUN  10  9  10     Recent Labs      02/07/18   1545  02/08/18   0258  02/09/18   0201   ALBUMIN  4.1  3.8  2.8*   TBILIRUBIN  1.1  1.9*  3.6*   ALKPHOSPHAT  66  50  53   TOTPROTEIN  7.3  6.5  5.2*   ALTSGPT  53*  49  30   ASTSGOT  34  25  19   CREATININE  0.65  0.67  0.60       Imaging:  Ct-soft Tissue Neck With    Result Date: 2/7/2018 2/7/2018 5:01 PM HISTORY/REASON FOR EXAM: Constant right shoulder pain. TECHNIQUE/EXAM DESCRIPTION AND NUMBER OF VIEWS:  CT soft tissue neck with contrast. The study was performed on a helical multidetector CT scanner. Contiguous thin section helical images were obtained of the neck from the skull base through the thoracic inlet. 80 mL of Omnipaque 350 nonionic contrast was injected intravenously. Low dose optimization technique was utilized for this CT  exam including automated exposure control and adjustment of the mA and/or kV according to patient size. COMPARISON: None. FINDINGS: There are no mass lesions or fluid collections in the deep or superficial soft tissues of the neck. There is no abnormal enhancement. Cervical lymph nodes show normal size and configuration. There is no pathologic adenopathy evident. Vascular enhancement  of the cervical carotid and vertebral arteries and internal jugular veins appears intact. The nasopharynx, oropharynx, and hypopharynx show normal airways and no abnormal soft tissue swelling. The larynx and trachea are unremarkable. The prevertebral soft tissues appear intact. The parapharyngeal spaces show normal symmetry and fat planes. The pterygopalatine and infratemporal fossae appear intact. The tongue and floor of the mouth are unremarkable. The submandibular, sublingual, and submental spaces appear intact. The parotid and submandibular glands show normal size and density. No abnormal calcifications are evident. The thyroid gland it is enlarged and appears homogeneous. The remaining structures at the thoracic inlet and superior mediastinum within the field of view are unremarkable. There is minimal posterior disc space narrowing and posterior osteophytic spurring at C5-6 and C6-7. No intracranial abnormalities are evident.     1.  No acute inflammatory process identified in the soft tissues of the neck. 2.  Thyroid gland enlargement. 3.  Minor degenerative changes posteriorly at C5-6 and C6-7.    Dx-chest-portable (1 View)    Result Date: 2/9/2018 2/9/2018 6:05 AM HISTORY/REASON FOR EXAM: Chest Pain TECHNIQUE/EXAM DESCRIPTION:  Single AP view of the chest. COMPARISON: None FINDINGS: Overlying cardiac leads are present. The cardiac silhouette appears within normal limits. Atherosclerotic calcification of the aorta is noted.  The central  pulmonary vasculature appears prominent and indistinct. The lungs appear well expanded  bilaterally.  Diffuse scattered hazy pulmonary parenchymal opacities are seen. Veil-like opacities are seen in the right lung base. The bony structures appear age-appropriate.     1.  Pulmonary edema and/or infiltrates. 2.  Small layering right pleural effusion 3.  Atherosclerosis    Dx-shoulder 2+ Right    Result Date: 2/6/2018 2/6/2018 9:05 AM HISTORY/REASON FOR EXAM:  Atraumatic Pain/Swelling/Deformity Acute pain of right shoulder after GLF 2 days ago TECHNIQUE/EXAM DESCRIPTION AND NUMBER OF VIEWS:  3 views of the RIGHT shoulder. COMPARISON: None FINDINGS: No acute fracture or dislocation. Mild osteoarthritis of the AC joint and glenohumeral joint     1. No acute osseous abnormality.    Mr-cervical Spine-with & W/o    Result Date: 2/8/2018 2/7/2018 7:03 PM HISTORY/REASON FOR EXAM:  Atraumatic pain, paresis. Right shoulder pain, headache. TECHNIQUE/EXAM DESCRIPTION: MRI of the cervical spine without and with contrast. The study was performed on a [employer] Signa 1.5 Aubrie MRI scanner. T1 sagittal, T2 fast spin-echo sagittal, T1 postcontrast fat-suppressed sagittal, and gradient-echo axial images were obtained of the cervical spine. 15 mL Omniscan contrast were administered  intravenously. COMPARISON:  None. FINDINGS:  There is minimal grade 1 retrolisthesis of and C5 on 6. There is mild and vertebral disc space narrowing at C5-6. The disk spaces are well-maintained and have a normal appearance. The cervical cord has a normal caliber, course and signal intensity. There is no definite evidence of abnormal enhancement in the cervical cord or spine. Level specific findings as follows: C2-C3: No significant central canal or neural foraminal stenosis. C3-C4: No significant central canal or neural foraminal stenosis. C4-C5: There is mild posterior midline disc bulge which effaces anterior thecal sac. There is borderline central canal stenosis. C5-C6: There is mild broad posterior disc bulge and endplate spurring. There is  mild ligamentum flavum hypertrophy. There is mild central canal stenosis. There is mild to moderate right and moderate left neural foraminal stenosis secondary to endplate spurring and facet arthropathy. C6-C7: There is mild broad posterior disc bulge. There is mild ligamentum flavum hypertrophy. There is mild central canal stenosis. There is moderate left neural foraminal stenosis secondary to disc bulge and facet arthropathy. C7-T1: No significant central canal or neural foraminal stenosis. The visualized prevertebral and posterior paraspinous soft tissues are grossly unremarkable.     1.  No acute abnormality or abnormal enhancement in the cervical spine. If there is strong clinical suspicion for discitis/osteomyelitis or epidural infectious process, short interval repeat imaging is recommended. 2.  Multilevel degenerative changes of the cervical spine as described above.    Mr-lumbar Spine-with & W/o    Result Date: 2/8/2018 2/7/2018 7:03 PM HISTORY/REASON FOR EXAM:  Atraumatic back pain. TECHNIQUE/EXAM DESCRIPTION: MRI of the lumbar spine without and with contrast. The study was performed on a [employer] Signa 1.5 Aubrie MRI scanner. T1 sagittal, T2 fast spin-echo sagittal, and T2 axial images were obtained of the lumbar spine. T1 postcontrast fat-suppressed sagittal images were obtained. 15 mL Omniscan contrast was administered intravenously. COMPARISON:  None. FINDINGS: Marrow signal intensity is normal. There is no abnormal enhancement. Vertebral body heights are preserved. Vertebral alignment is normal. There is mild intervertebral disc space narrowing at L5-S1 with decrease of the normal T2 signal intensity within the disc space consistent with mild degenerative disc desiccation. The spinal cord demonstrates normal course and caliber. There is no abnormal cord enhancement. Conus terminates at the T12/L1 level. Level-specific findings as follows: L5-S1: There is mild broad posterior disc bulge. There is mild  facet arthropathy. There is mild bilateral neural foraminal stenosis. L4-5: There is mild diffuse disc bulge. There is minimal bilateral neural foraminal stenosis. L3-4: No significant central canal or neural foraminal stenosis. L2-3: No significant central canal or neural foraminal stenosis. L1-2: No significant central canal or neural foraminal stenosis. The visualized prevertebral and posterior paraspinous soft tissues are grossly unremarkable.     1.  No acute abnormality or abnormal enhancement in the lumbar spine. If there is strong clinical suspicion for discitis/osteomyelitis or epidural infectious process, short interval repeat imaging is recommended. 2.  Multilevel degenerative changes of the lumbar spine as described above.    Mr-shoulder-w/o Right    Addendum Date: 2/8/2018    I further discussed this case with Dr. Badillo at 4:20 PM on 2/8/2018. The patient is exquisitely tender over the medial clavicle. That area is not included on this study. There is however likely some edema and induration of the soft tissues surrounding the mid and medial clavicle at the edge of the images. Review of CT scan of the neck and chest from the same date demonstrates induration of the soft tissues surrounding the right medial clavicle as well as centered in the area of the sternoclavicular joint. These findings would be suspicious for septic arthritis/osteomyelitis of the right sternoclavicular joint.    Result Date: 2/8/2018 2/7/2018 7:03 PM HISTORY/REASON FOR EXAM: Right-sided shoulder pain TECHNIQUE/EXAM DESCRIPTION: MRI of the RIGHT shoulder without contrast. Using a [employer] Signa 1.5 Aubrie MRI scanner, T1 sagittal, fast spin-echo T2 fat-suppressed oblique coronal, sagittal, and axial and intermediate fast spin-echo oblique coronal images were obtained. COMPARISON: None. FINDINGS: Osseous acromial outlet: The acromion demonstrates a curved undersurface. There is no lateral downsloping. There is no evidence of an  inferiorly directed subacromial enthesophyte. There is mild degenerative change of the acromioclavicular joint. There are no inferiorly directed osteophytes. There is minimal fluid seen within the subacromial/subdeltoid bursa. Rotator cuff: There is tendinopathy involving the supraspinatus tendon with mild fraying of the bursal surface fibers. No evidence of full-thickness tear or retraction. Glenoid labrum: There is no definite superior labral tear or evidence of disruption of the biceps anchor. There is a multiloculated fluid signal focus seen anterior to the subscapularis tendon which measures 2.4 x 1.4 x 1.1 cm in size. This extends towards the anterior/superior labrum. The anterior/inferior and posterior/inferior glenoid labrum are intact. Osseous structures/Cartilaginous surfaces: No evidence of marrow edema.  Cartilaginous surfaces are well maintained. Miscellaneous: No significant joint effusion. The long head of the biceps tendon is appropriately situated within the bicipital groove.     1.  Mild tendinopathy of the supraspinatus tendon with very mild fraying of the bursal surface fibers. No evidence of full-thickness tear or retraction. 2.  No definite tear of the glenoid labrum. There is however a multiloculated fluid signal focus present anterior to the subscapularis tendon which extends towards the anterior/superior labrum. This may represent a ganglion cyst but can also potential he  represent a paralabral cyst related to nonvisualized superior labral tear. 3.  Mild degenerative change of the acromioclavicular joint.    Mr-thoracic Spine-with & W/o    Result Date: 2/8/2018 2/7/2018 7:03 PM HISTORY/REASON FOR EXAM: Atraumatic back and neck pain. TECHNIQUE/EXAM DESCRIPTION: MRI of the thoracic spine without and with contrast. The study was performed on a [employer] 1.5 Aubrie MRI scanner. T1 sagittal, T1 postcontrast fat-suppressed sagittal, T2 sagittal, and T2 axial images were obtained of the thoracic  spine. 15 mL Omniscan contrast were administered intravenously. COMPARISON:  None FINDINGS: The thoracic vertebral bodies have a normal height and alignment. The thoracic disks have a normal appearance and signal intensity. The thoracic cord has a normal caliber, course and signal intensity. There is no evidence of abnormal enhancement in the thoracic cord or spine. There is no evidence of disk extrusion, cord compression, central canal stenosis, or neural foraminal stenosis. There is atelectasis and/or consolidation in the visualized portion of the right lung base.     1.  No acute abnormality or abnormal enhancement in the thoracic spine. If there is strong clinical suspicion for discitis/osteomyelitis or epidural infectious process, short interval repeat imaging is recommended. 2.  Possible right lung base pneumonitis.    Echocardiogram Comp W/o Cont    Result Date: 2018  Transthoracic Echo Report Echocardiography Laboratory CONCLUSIONS No prior study is available for comparison. Normal left ventricular chamber size. Left ventricular ejection fraction is visually estimated to be 60%. No significant valve abnormalities. Estimated right ventricular systolic pressure is 45 mmHg. JESSICA ANDRADE Exam Date:         2018                    13:18 Exam Location:     Inpatient Priority:          Routine Ordering Physician:        SENIA CHANG Referring Physician:       [id number] Feliz Sonographer:               Kristin Dykes RDCS Age:    57     Gender:    F MRN:    [id number] :    1960 BSA:    1.68   Ht (in):    61     Wt (lb):    151 Exam Type:     Complete Indications:     Fever ICD Codes:       780.6 CPT Codes:       45914 BP:   104    /   75     HR:   70 Technical Quality:       Fair MEASUREMENTS  (Male / Female) Normal Values 2D ECHO LV Diastolic Diameter PLAX        3.9 cm                4.2 - 5.9 / 3.9 - 5.3 cm LV Systolic Diameter PLAX         2.4 cm                2.1 - 4.0 cm IVS  Diastolic Thickness           0.98 cm               LVPW Diastolic Thickness          1 cm                  RV Diameter 4C                    2.3 cm                2.5 - 2.9 cm LVOT Diameter                     1.8 cm                RA Diameter                       2.8 cm                Estimated LV Ejection Fraction    60 %                  LV Ejection Fraction MOD BP       60.6 %                >= 55  % LV Ejection Fraction MOD 4C       63.1 %                LV Ejection Fraction MOD 2C       56.9 %                LA Volume Index                   21 cm³/m²             16 - 28 cm³/m² IVC Diameter                      1.2 cm                M-MODE Aortic Root Diameter MM           3 cm                  DOPPLER AV Peak Velocity                  1.4 m/s               AV Peak Gradient                  8.2 mmHg              AV Mean Gradient                  4 mmHg                LVOT Peak Velocity                1.2 m/s               AV Area Cont Eq vti               2.1 cm²               MV Velocity Time Integral         25 cm                 Mitral E Point Velocity           1.2 m/s               Mitral E to A Ratio               1.1                   Mitral A Duration                 104 ms                MV Pressure Half Time             40.7 ms               MV Area PHT                       5.4 cm²               MV Deceleration Time              143 ms                Pulmonary Vein Systolic Velocity  0.45 m/s              Pulmonary Vein Diastolic Velocit  0.41 m/s              Pulmonary Vein S/D Ratio          1.1                   Pulmonary Vein A Velocity         0.5 m/s               Pulmonary Vein A Duration         157 ms                Pulm Vein-MV A Duration           53.1 ms               TV Peak E Velocity                0.52 m/s              TR Peak Velocity                  299 cm/s              PV Peak Velocity                  0.95 m/s              PV Peak Gradient                  3.6 mmHg               RVOT Peak Velocity                0.74 m/s              * Indicates values subject to auto-interpretation LV EF:  60    % FINDINGS Left Ventricle Normal left ventricular chamber size. Normal left ventricular wall thickness. Normal left ventricular systolic function. Left ventricular ejection fraction is visually estimated to be 60%. Normal regional wall motion. Normal diastolic function. Right Ventricle The right ventricle was normal in size and function. Right Atrium The right atrium is normal in size. Normal inferior vena cava size and inspiratory collapse. Left Atrium The left atrium is normal in size. Left atrial volume index is 21 mL/sq m. Mitral Valve Structurally normal mitral valve without significant stenosis or regurgitation. Aortic Valve Structurally normal aortic valve without significant stenosis or regurgitation. Tricuspid Valve Structurally normal tricuspid valve. No tricuspid stenosis. Trace tricuspid regurgitation. Estimated right ventricular systolic pressure is 45 mmHg. Pulmonic Valve Structurally normal pulmonic valve without significant stenosis or regurgitation. Pericardium Normal pericardium without effusion. Aorta The aortic root is normal. Ascending aorta diameter is 2.9 cm. Vinnie Naqvi MD (Electronically Signed) Final Date:     09 February 2018                 14:50    Ct-cta Chest Pulmonary Artery W/ Recons    Result Date: 2/7/2018 2/7/2018 5:01 PM HISTORY/REASON FOR EXAM:  Chest pain. TECHNIQUE/EXAM DESCRIPTION: CT angiogram scan for pulmonary embolism with contrast, with reconstructions. 1.25 mm helical sections were obtained from the lung apices through the lung bases following the rapid bolus administration of 80 mL of Omnipaque 350 nonionic contrast. Thin-section overlapping reconstruction interval was utilized. Coronal reconstructions were generated from the axial data. MIP post processing was performed and utilized for the interpretation. Low dose optimization technique was  "utilized for this CT exam including automated exposure control and adjustment of the mA and/or kV according to patient size. COMPARISON: None FINDINGS: Pulmonary Embolism: No. Main Pulmonary Arteries: No. Segmental branches: No. Subsegmental branches: No. Additional Comments: The heart is normal in size and configuration. Lungs: There is minimal atelectasis or pneumonia in the posterior aspect of the right lower lobe and minimally in the anterior inferior aspect of the lingula. The remainder of the lung parenchyma is clear. Pleura: No pleural effusion. Nodes: No enlarged lymph nodes. Additional findings: There is mild enlargement of both adrenal glands. No hydronephrosis is visualized     1.  No evidence of pulmonary embolism. 2.  Minimal atelectasis or pneumonia in the right lower lobe and in the lingula. 3.  Mild bilateral adrenal gland enlargement. Significance unknown.       Micro:  Results     Procedure Component Value Units Date/Time    BLOOD CULTURE [[id number]]  (Abnormal) Collected:  02/07/18 1848    Order Status:  Completed Specimen:  Blood from Peripheral Updated:  02/09/18 1017     Significant Indicator POS (POS)     Source BLD     Site PERIPHERAL     Blood Culture Growth detected by Bactec instrument.  02/08/2018  08:50 (A)     Blood Culture Staphylococcus aureus (A)    Narrative:       CALL  [place]  171 tel. [phone],  CALLED  171 tel. [phone] 02/08/2018, 08:50, RB PERF. RESULTS CALLED  TO:[id number] and DILAN  1 of 2 for Blood Culture x 2 sites order. Per Hospital  Policy: Only change Specimen Src: to \"Line\" if specified by  physician order.    FLUID CULTURE W/GRAM STAIN [[id number]]     Order Status:  No result Specimen:  Body Fluid from Other Body Fluid     BLOOD CULTURE [[id number]]  (Abnormal) Collected:  02/07/18 1848    Order Status:  Completed Specimen:  Blood from Peripheral Updated:  02/08/18 0849     Significant Indicator POS (POS)     Source BLD     Site PERIPHERAL     Blood Culture -- (A)     " "Growth detected by Bactec instrument.  02/08/2018  08:43  Staphylococcus aureus (methicillin sensitive)  detected by PCR.  Susceptibility to follow.      Narrative:       CALL  [facility]  171 tel. [phone],  CALLED  171 tel. [phone] 02/08/2018, 08:49, RB PERF. RESULTS CALLED  TO:[id number] and DILAN  2 of 2 blood culture x2  Sites order. Per Hospital Policy:  Only change Specimen Src: to \"Line\" if specified by physician  order.    Urinalysis [[id number]] Collected:  02/08/18 0539    Order Status:  Completed Specimen:  Urine from Urine, Clean Catch Updated:  02/08/18 0557     Color Yellow     Character Clear     Specific Gravity 1.012     Ph 6.0     Glucose Negative mg/dL      Ketones Negative mg/dL      Protein Negative mg/dL      Bilirubin Negative     Urobilinogen, Urine 0.2     Nitrite Negative     Leukocyte Esterase Negative     Occult Blood Negative     Micro Urine Req see below     Comment: Microscopic examination not performed when specimen is clear  and chemically negative for protein, blood, leukocyte esterase  and nitrite.         Narrative:       If not done within the last 24 hours    Culture Respiratory W/ GRM STN [[id number]]     Order Status:  Completed Specimen:  Respirate from Sputum     Blood Culture [[id number]]     Order Status:  Canceled Specimen:  Blood from Peripheral     Blood Culture [[id number]]     Order Status:  Canceled Specimen:  Blood from Peripheral     Culture Respiratory W/ GRM STN [[id number]]     Order Status:  Canceled Specimen:  Respirate from Sputum     Blood Culture [[id number]] Collected:  02/07/18 0000    Order Status:  Canceled Specimen:  Other from Peripheral     Blood Culture [[id number]] Collected:  02/07/18 0000    Order Status:  Canceled Specimen:  Other from Peripheral           Assessment:  Active Hospital Problems    Diagnosis   • *Sepsis (CMS-HCC) [A41.9]   • Hypokalemia [E87.6]   • Lactic acidosis [E87.2]   • Neck pain [M54.2]   • Hyponatremia [E87.1]   • CAP (community " acquired pneumonia) [J18.9]       Plan:  MSSA bacteremia  Blood cultures are positive on 2/7/2018  Repeat blood cultures  TTE is negative from 2/9/28  Check WES  ESR 7 and CRP 15.6    Possible abscess of the soft tissue chest  She has significant erythema right side of her neck and clavicular area extending onto the chest  May be a developing abscess  Seen by surgery  Continue to monitor clinically    Pneumonia  Continue Ancef  Procalcitonin is 3.46  Discussed with internal medicine.

## 2018-02-09 NOTE — PROGRESS NOTES
Updated Dr. Badillo and Mello regarding the patient, eating and drinking fine now. Received orders to d/c IV fluids. Read back order to d/c fluids

## 2018-02-09 NOTE — PROGRESS NOTES
Bedside report completed with noc RN. Pt ambulating back from restroom, w/ steady gait, CNA helping with IV pole. Bed locked in low position, call light within reach. Bed alarm off, she calls appropriately per noc RN. Day RN reiterated to call for assistance. Daughter at bedside. Reviewed plan of care with noc RN and pt. Patient has no questions at this time.   C/o shoulder pain 8/10.

## 2018-02-09 NOTE — PROGRESS NOTES
Oral temperature rechecked reading 101.2 F. Ice packs placed in bilateral axilla. Will continue to monitor.

## 2018-02-09 NOTE — PROGRESS NOTES
"Pt states \"I'm worried about my shoulder because it's so swollen\". Provided with ice pack to help with swelling.   "

## 2018-02-09 NOTE — PROGRESS NOTES
RAPHAEL Beverly informed this RN of pt vitals   Temp 103.8 F  Pulse 122  Resp 22  Temperature rechecked with oral thermometer which read 102.0F  0035 Dr. Ortega notified. Orders for IBU profen received.   Will continue to monitor.

## 2018-02-09 NOTE — PROGRESS NOTES
Report received at bedside, patient care assumed, tele box on. Pt AAO x 4, no signs of distress noted at this time. POC discussed with pt and all questions answered. Pt c/o 7/10 pain in R shoulder. Repositioned for comfort. Pt denies any additional needs at this time. Bed in lowest position, bed alarm off, pt calls appropriately, pt educated on fall risk and verbalized understanding. Call light and personal possessions within reach. Will continue to monitor.

## 2018-02-09 NOTE — PROGRESS NOTES
Pt pressed call light for assistance. C/O 9/10 shooting pain to R side of head. Medicated per MAR, given hot pack for warmth and comfort, discussed non-pharmacological pain management including slowing breathing and distraction with TV. Will continue to monitor.

## 2018-02-10 ENCOUNTER — APPOINTMENT (OUTPATIENT)
Dept: RADIOLOGY | Facility: MEDICAL CENTER | Age: 58
DRG: 853 | End: 2018-02-10
Attending: INTERNAL MEDICINE
Payer: COMMERCIAL

## 2018-02-10 PROBLEM — I48.91 A-FIB (HCC): Status: ACTIVE | Noted: 2018-02-10

## 2018-02-10 LAB
ALBUMIN SERPL BCP-MCNC: 3.1 G/DL (ref 3.2–4.9)
ALBUMIN/GLOB SERPL: 1.1 G/DL
ALP SERPL-CCNC: 75 U/L (ref 30–99)
ALT SERPL-CCNC: 26 U/L (ref 2–50)
ANION GAP SERPL CALC-SCNC: 10 MMOL/L (ref 0–11.9)
AST SERPL-CCNC: 22 U/L (ref 12–45)
BACTERIA BLD CULT: ABNORMAL
BASOPHILS # BLD AUTO: 0 % (ref 0–1.8)
BASOPHILS # BLD: 0 K/UL (ref 0–0.12)
BILIRUB SERPL-MCNC: 3.6 MG/DL (ref 0.1–1.5)
BUN SERPL-MCNC: 6 MG/DL (ref 8–22)
CALCIUM SERPL-MCNC: 8.4 MG/DL (ref 8.5–10.5)
CHLORIDE SERPL-SCNC: 101 MMOL/L (ref 96–112)
CO2 SERPL-SCNC: 21 MMOL/L (ref 20–33)
CREAT SERPL-MCNC: 0.49 MG/DL (ref 0.5–1.4)
EKG IMPRESSION: NORMAL
EOSINOPHIL # BLD AUTO: 0.09 K/UL (ref 0–0.51)
EOSINOPHIL NFR BLD: 0.9 % (ref 0–6.9)
ERYTHROCYTE [DISTWIDTH] IN BLOOD BY AUTOMATED COUNT: 46.7 FL (ref 35.9–50)
GLOBULIN SER CALC-MCNC: 2.7 G/DL (ref 1.9–3.5)
GLUCOSE SERPL-MCNC: 137 MG/DL (ref 65–99)
HCT VFR BLD AUTO: 31.5 % (ref 37–47)
HGB BLD-MCNC: 11.4 G/DL (ref 12–16)
LACTATE BLD-SCNC: 1.3 MMOL/L (ref 0.5–2)
LYMPHOCYTES # BLD AUTO: 1.38 K/UL (ref 1–4.8)
LYMPHOCYTES NFR BLD: 13.8 % (ref 22–41)
MAGNESIUM SERPL-MCNC: 1.7 MG/DL (ref 1.5–2.5)
MANUAL DIFF BLD: ABNORMAL
MCH RBC QN AUTO: 33.4 PG (ref 27–33)
MCHC RBC AUTO-ENTMCNC: 36.2 G/DL (ref 33.6–35)
MCV RBC AUTO: 92.4 FL (ref 81.4–97.8)
MONOCYTES # BLD AUTO: 0.34 K/UL (ref 0–0.85)
MONOCYTES NFR BLD AUTO: 3.4 % (ref 0–13.4)
MORPHOLOGY BLD-IMP: NORMAL
NEUTROPHILS # BLD AUTO: 8.19 K/UL (ref 2–7.15)
NEUTROPHILS NFR BLD: 60.3 % (ref 44–72)
NEUTS BAND NFR BLD MANUAL: 21.6 % (ref 0–10)
NRBC # BLD AUTO: 0 K/UL
NRBC BLD-RTO: 0 /100 WBC
OVALOCYTES BLD QL SMEAR: NORMAL
PLATELET # BLD AUTO: 162 K/UL (ref 164–446)
PLATELET BLD QL SMEAR: NORMAL
PMV BLD AUTO: 10.1 FL (ref 9–12.9)
POIKILOCYTOSIS BLD QL SMEAR: NORMAL
POTASSIUM SERPL-SCNC: 3.3 MMOL/L (ref 3.6–5.5)
PROCALCITONIN SERPL-MCNC: 1.82 NG/ML
PROT SERPL-MCNC: 5.8 G/DL (ref 6–8.2)
RBC # BLD AUTO: 3.41 M/UL (ref 4.2–5.4)
RBC BLD AUTO: PRESENT
SIGNIFICANT IND 70042: ABNORMAL
SIGNIFICANT IND 70042: ABNORMAL
SITE SITE: ABNORMAL
SITE SITE: ABNORMAL
SODIUM SERPL-SCNC: 132 MMOL/L (ref 135–145)
SOURCE SOURCE: ABNORMAL
SOURCE SOURCE: ABNORMAL
TSH SERPL DL<=0.005 MIU/L-ACNC: 0.39 UIU/ML (ref 0.38–5.33)
WBC # BLD AUTO: 10 K/UL (ref 4.8–10.8)

## 2018-02-10 PROCEDURE — A9270 NON-COVERED ITEM OR SERVICE: HCPCS | Performed by: INTERNAL MEDICINE

## 2018-02-10 PROCEDURE — 700111 HCHG RX REV CODE 636 W/ 250 OVERRIDE (IP): Performed by: INTERNAL MEDICINE

## 2018-02-10 PROCEDURE — 93005 ELECTROCARDIOGRAM TRACING: CPT | Performed by: INTERNAL MEDICINE

## 2018-02-10 PROCEDURE — 700101 HCHG RX REV CODE 250

## 2018-02-10 PROCEDURE — 700111 HCHG RX REV CODE 636 W/ 250 OVERRIDE (IP): Performed by: HOSPITALIST

## 2018-02-10 PROCEDURE — 770022 HCHG ROOM/CARE - ICU (200)

## 2018-02-10 PROCEDURE — 93010 ELECTROCARDIOGRAM REPORT: CPT | Performed by: INTERNAL MEDICINE

## 2018-02-10 PROCEDURE — 87077 CULTURE AEROBIC IDENTIFY: CPT

## 2018-02-10 PROCEDURE — 700102 HCHG RX REV CODE 250 W/ 637 OVERRIDE(OP): Performed by: INTERNAL MEDICINE

## 2018-02-10 PROCEDURE — 160048 HCHG OR STATISTICAL LEVEL 1-5: Performed by: OTOLARYNGOLOGY

## 2018-02-10 PROCEDURE — 160039 HCHG SURGERY MINUTES - EA ADDL 1 MIN LEVEL 3: Performed by: OTOLARYNGOLOGY

## 2018-02-10 PROCEDURE — 501445 HCHG STAPLER, SKIN DISP: Performed by: OTOLARYNGOLOGY

## 2018-02-10 PROCEDURE — 700102 HCHG RX REV CODE 250 W/ 637 OVERRIDE(OP): Performed by: HOSPITALIST

## 2018-02-10 PROCEDURE — 160035 HCHG PACU - 1ST 60 MINS PHASE I: Performed by: OTOLARYNGOLOGY

## 2018-02-10 PROCEDURE — 501838 HCHG SUTURE GENERAL: Performed by: OTOLARYNGOLOGY

## 2018-02-10 PROCEDURE — 0J940ZZ DRAINAGE OF RIGHT NECK SUBCUTANEOUS TISSUE AND FASCIA, OPEN APPROACH: ICD-10-PCS | Performed by: OTOLARYNGOLOGY

## 2018-02-10 PROCEDURE — 71045 X-RAY EXAM CHEST 1 VIEW: CPT

## 2018-02-10 PROCEDURE — 500383 HCHG DRAIN, PENROSE 3/8X12: Performed by: OTOLARYNGOLOGY

## 2018-02-10 PROCEDURE — 160009 HCHG ANES TIME/MIN: Performed by: OTOLARYNGOLOGY

## 2018-02-10 PROCEDURE — 85007 BL SMEAR W/DIFF WBC COUNT: CPT

## 2018-02-10 PROCEDURE — 700101 HCHG RX REV CODE 250: Performed by: INTERNAL MEDICINE

## 2018-02-10 PROCEDURE — 700105 HCHG RX REV CODE 258: Performed by: INTERNAL MEDICINE

## 2018-02-10 PROCEDURE — 99291 CRITICAL CARE FIRST HOUR: CPT | Performed by: HOSPITALIST

## 2018-02-10 PROCEDURE — 83605 ASSAY OF LACTIC ACID: CPT

## 2018-02-10 PROCEDURE — 160002 HCHG RECOVERY MINUTES (STAT): Performed by: OTOLARYNGOLOGY

## 2018-02-10 PROCEDURE — 84443 ASSAY THYROID STIM HORMONE: CPT

## 2018-02-10 PROCEDURE — 87205 SMEAR GRAM STAIN: CPT

## 2018-02-10 PROCEDURE — 700105 HCHG RX REV CODE 258: Performed by: HOSPITALIST

## 2018-02-10 PROCEDURE — 80053 COMPREHEN METABOLIC PANEL: CPT

## 2018-02-10 PROCEDURE — 700111 HCHG RX REV CODE 636 W/ 250 OVERRIDE (IP)

## 2018-02-10 PROCEDURE — 83735 ASSAY OF MAGNESIUM: CPT

## 2018-02-10 PROCEDURE — 700105 HCHG RX REV CODE 258

## 2018-02-10 PROCEDURE — 84145 PROCALCITONIN (PCT): CPT

## 2018-02-10 PROCEDURE — 70491 CT SOFT TISSUE NECK W/DYE: CPT

## 2018-02-10 PROCEDURE — A9270 NON-COVERED ITEM OR SERVICE: HCPCS | Performed by: HOSPITALIST

## 2018-02-10 PROCEDURE — 160028 HCHG SURGERY MINUTES - 1ST 30 MINS LEVEL 3: Performed by: OTOLARYNGOLOGY

## 2018-02-10 PROCEDURE — 85027 COMPLETE CBC AUTOMATED: CPT

## 2018-02-10 PROCEDURE — 87075 CULTR BACTERIA EXCEPT BLOOD: CPT

## 2018-02-10 PROCEDURE — 700117 HCHG RX CONTRAST REV CODE 255: Performed by: HOSPITALIST

## 2018-02-10 PROCEDURE — 87070 CULTURE OTHR SPECIMN AEROBIC: CPT

## 2018-02-10 PROCEDURE — 87186 SC STD MICRODIL/AGAR DIL: CPT

## 2018-02-10 RX ORDER — METOPROLOL TARTRATE 1 MG/ML
5 INJECTION, SOLUTION INTRAVENOUS
Status: DISCONTINUED | OUTPATIENT
Start: 2018-02-10 | End: 2018-02-16 | Stop reason: HOSPADM

## 2018-02-10 RX ORDER — BACITRACIN 50000 [IU]/1
INJECTION, POWDER, FOR SOLUTION INTRAMUSCULAR
Status: DISCONTINUED | OUTPATIENT
Start: 2018-02-10 | End: 2018-02-10 | Stop reason: HOSPADM

## 2018-02-10 RX ORDER — AMIODARONE HYDROCHLORIDE 200 MG/1
200 TABLET ORAL TWICE DAILY
Status: DISCONTINUED | OUTPATIENT
Start: 2018-02-10 | End: 2018-02-16 | Stop reason: HOSPADM

## 2018-02-10 RX ORDER — SODIUM CHLORIDE 9 MG/ML
500 INJECTION, SOLUTION INTRAVENOUS ONCE
Status: DISPENSED | OUTPATIENT
Start: 2018-02-10 | End: 2018-02-11

## 2018-02-10 RX ORDER — DEXTROSE MONOHYDRATE 50 MG/ML
INJECTION, SOLUTION INTRAVENOUS CONTINUOUS
Status: DISCONTINUED | OUTPATIENT
Start: 2018-02-10 | End: 2018-02-10

## 2018-02-10 RX ORDER — HEPARIN SODIUM 5000 [USP'U]/ML
5000 INJECTION, SOLUTION INTRAVENOUS; SUBCUTANEOUS EVERY 8 HOURS
Status: DISCONTINUED | OUTPATIENT
Start: 2018-02-10 | End: 2018-02-16 | Stop reason: HOSPADM

## 2018-02-10 RX ORDER — POTASSIUM CHLORIDE 20 MEQ/1
40 TABLET, EXTENDED RELEASE ORAL ONCE
Status: COMPLETED | OUTPATIENT
Start: 2018-02-10 | End: 2018-02-10

## 2018-02-10 RX ORDER — HEPARIN SODIUM 1000 [USP'U]/ML
2600 INJECTION, SOLUTION INTRAVENOUS; SUBCUTANEOUS PRN
Status: DISCONTINUED | OUTPATIENT
Start: 2018-02-10 | End: 2018-02-10

## 2018-02-10 RX ORDER — HEPARIN SODIUM 1000 [USP'U]/ML
5000 INJECTION, SOLUTION INTRAVENOUS; SUBCUTANEOUS ONCE
Status: DISCONTINUED | OUTPATIENT
Start: 2018-02-10 | End: 2018-02-10

## 2018-02-10 RX ORDER — SODIUM CHLORIDE 9 MG/ML
INJECTION, SOLUTION INTRAVENOUS
Status: COMPLETED
Start: 2018-02-10 | End: 2018-02-10

## 2018-02-10 RX ORDER — MAGNESIUM SULFATE HEPTAHYDRATE 40 MG/ML
2 INJECTION, SOLUTION INTRAVENOUS ONCE
Status: COMPLETED | OUTPATIENT
Start: 2018-02-10 | End: 2018-02-10

## 2018-02-10 RX ORDER — METOPROLOL TARTRATE 1 MG/ML
INJECTION, SOLUTION INTRAVENOUS
Status: ACTIVE
Start: 2018-02-10 | End: 2018-02-10

## 2018-02-10 RX ORDER — METOPROLOL TARTRATE 1 MG/ML
INJECTION, SOLUTION INTRAVENOUS
Status: COMPLETED
Start: 2018-02-10 | End: 2018-02-10

## 2018-02-10 RX ORDER — SODIUM CHLORIDE 9 MG/ML
INJECTION, SOLUTION INTRAVENOUS CONTINUOUS
Status: DISCONTINUED | OUTPATIENT
Start: 2018-02-10 | End: 2018-02-11

## 2018-02-10 RX ADMIN — HEPARIN SODIUM 5000 UNITS: 5000 INJECTION, SOLUTION INTRAVENOUS; SUBCUTANEOUS at 22:00

## 2018-02-10 RX ADMIN — ACETAMINOPHEN 650 MG: 325 TABLET, FILM COATED ORAL at 00:28

## 2018-02-10 RX ADMIN — MAGNESIUM SULFATE IN WATER 2 G: 40 INJECTION, SOLUTION INTRAVENOUS at 08:09

## 2018-02-10 RX ADMIN — HEPARIN SODIUM 5000 UNITS: 5000 INJECTION, SOLUTION INTRAVENOUS; SUBCUTANEOUS at 15:24

## 2018-02-10 RX ADMIN — KETOROLAC TROMETHAMINE 10 MG: 10 TABLET, FILM COATED ORAL at 08:02

## 2018-02-10 RX ADMIN — METOPROLOL TARTRATE 5 MG: 5 INJECTION INTRAVENOUS at 09:04

## 2018-02-10 RX ADMIN — SODIUM CHLORIDE: 9 INJECTION, SOLUTION INTRAVENOUS at 21:59

## 2018-02-10 RX ADMIN — IOHEXOL 80 ML: 350 INJECTION, SOLUTION INTRAVENOUS at 09:36

## 2018-02-10 RX ADMIN — HYDROMORPHONE HYDROCHLORIDE 0.25 MG: 2 INJECTION INTRAMUSCULAR; INTRAVENOUS; SUBCUTANEOUS at 02:27

## 2018-02-10 RX ADMIN — AMIODARONE HYDROCHLORIDE 1 MG/MIN: 50 INJECTION, SOLUTION INTRAVENOUS at 10:42

## 2018-02-10 RX ADMIN — METOPROLOL TARTRATE 25 MG: 25 TABLET, FILM COATED ORAL at 20:15

## 2018-02-10 RX ADMIN — METOPROLOL TARTRATE 5 MG: 5 INJECTION INTRAVENOUS at 08:25

## 2018-02-10 RX ADMIN — FAMOTIDINE 20 MG: 20 TABLET, FILM COATED ORAL at 08:02

## 2018-02-10 RX ADMIN — ACETAMINOPHEN 650 MG: 325 TABLET, FILM COATED ORAL at 08:02

## 2018-02-10 RX ADMIN — CEFAZOLIN SODIUM 2 G: 2 INJECTION, SOLUTION INTRAVENOUS at 16:31

## 2018-02-10 RX ADMIN — CEFAZOLIN SODIUM 2 G: 2 INJECTION, SOLUTION INTRAVENOUS at 06:11

## 2018-02-10 RX ADMIN — AMIODARONE HYDROCHLORIDE 150 MG: 1.5 INJECTION, SOLUTION INTRAVENOUS at 10:26

## 2018-02-10 RX ADMIN — METOPROLOL TARTRATE 25 MG: 25 TABLET, FILM COATED ORAL at 09:00

## 2018-02-10 RX ADMIN — STANDARDIZED SENNA CONCENTRATE AND DOCUSATE SODIUM 2 TABLET: 8.6; 5 TABLET, FILM COATED ORAL at 20:15

## 2018-02-10 RX ADMIN — AMIODARONE HYDROCHLORIDE 200 MG: 200 TABLET ORAL at 15:24

## 2018-02-10 RX ADMIN — OXYCODONE HYDROCHLORIDE 5 MG: 5 TABLET ORAL at 06:11

## 2018-02-10 RX ADMIN — CEFAZOLIN SODIUM 2 G: 2 INJECTION, SOLUTION INTRAVENOUS at 22:00

## 2018-02-10 RX ADMIN — ACETAMINOPHEN 650 MG: 325 TABLET, FILM COATED ORAL at 23:26

## 2018-02-10 RX ADMIN — POTASSIUM CHLORIDE 40 MEQ: 1500 TABLET, EXTENDED RELEASE ORAL at 08:09

## 2018-02-10 RX ADMIN — SODIUM CHLORIDE: 9 INJECTION, SOLUTION INTRAVENOUS at 08:15

## 2018-02-10 RX ADMIN — FAMOTIDINE 20 MG: 20 TABLET, FILM COATED ORAL at 20:15

## 2018-02-10 RX ADMIN — STANDARDIZED SENNA CONCENTRATE AND DOCUSATE SODIUM 2 TABLET: 8.6; 5 TABLET, FILM COATED ORAL at 08:02

## 2018-02-10 RX ADMIN — HEPARIN SODIUM 5000 UNITS: 5000 INJECTION, SOLUTION INTRAVENOUS; SUBCUTANEOUS at 06:11

## 2018-02-10 RX ADMIN — SODIUM CHLORIDE: 9 INJECTION, SOLUTION INTRAVENOUS at 08:59

## 2018-02-10 ASSESSMENT — PAIN SCALES - GENERAL
PAINLEVEL_OUTOF10: 0
PAINLEVEL_OUTOF10: 0
PAINLEVEL_OUTOF10: 5
PAINLEVEL_OUTOF10: 7
PAINLEVEL_OUTOF10: 0
PAINLEVEL_OUTOF10: 8
PAINLEVEL_OUTOF10: 0

## 2018-02-10 ASSESSMENT — ENCOUNTER SYMPTOMS
ABDOMINAL PAIN: 0
WEAKNESS: 1
RESPIRATORY NEGATIVE: 1
FEVER: 0
VOMITING: 0
FEVER: 1
EYES NEGATIVE: 1
CHILLS: 1
MYALGIAS: 1
SHORTNESS OF BREATH: 0
CARDIOVASCULAR NEGATIVE: 1
NECK PAIN: 1
SPEECH CHANGE: 0
SENSORY CHANGE: 0
GASTROINTESTINAL NEGATIVE: 1
NAUSEA: 0
COUGH: 0

## 2018-02-10 NOTE — PROGRESS NOTES
[facility] Internal Medicine Interval Note    Name Urszula Persaud       1960   Age/Sex 57 y.o. female   MRN [id number]   Code Status FULL        Chief complaint/ reason for interval visit (Primary Diagnosis)   57 year old female with hx of HTN came with neck pain we found cellulitis deep neck infection and bacteremia MSSA.     Interval Problem Daily Status Update    - The patient was admitted 2 days ago with sepsis die cellulitis and deep neck infection and bacteremia MSSA cefazolin was started with a good result.  - Today the patient develped fever and Afib with RVR, bolus fluid was given, and metoprolol 5 mg IV or 3 times and no responds, amiodarone has been started.  - CT neck with contras was ordered stat and showed small abscess, the patient underwent a drainage.  - The patient was transferred to ICU for higher level of care.    - Waiting for WES to r/o endocarditis.       Review of Systems   Constitutional: Negative for fever.   HENT: Negative.    Eyes: Negative.    Respiratory: Negative.    Cardiovascular: Negative.    Gastrointestinal: Negative.    Genitourinary: Negative.    Musculoskeletal: Positive for neck pain.   Skin: Negative for rash.   Neurological: Positive for weakness.       Consultants/Specialty  Ortho     Disposition  Home/ICU    Quality Measures    Reviewed items::  Labs reviewed, EKG reviewed, Medications reviewed and Radiology images reviewed  Soliz catheter::  No Soliz  DVT prophylaxis pharmacological::  Heparin          Physical Exam       Vitals:    02/10/18 1350 02/10/18 1400 02/10/18 1401 02/10/18 1415   BP:       Pulse: 74 76 77 72   Resp: 16 (!) 24  16   Temp:  36.4 °C (97.6 °F)     SpO2: 99% 99% 99% 99%   Weight:       Height:         Body mass index is 29.58 kg/m². Weight: 71 kg (156 lb 8.4 oz)  Oxygen Therapy:  Pulse Oximetry: 99 %, O2 (LPM): 4, O2 Delivery: Nasal Cannula    Physical Exam   Constitutional:  She is oriented to person, place, and time and well-developed, well-nourished, and in no distress. No distress.   HENT:   Head: Normocephalic.   Redness swelling and warm skin on the R side of lower neck.    Neck: No JVD present. No tracheal deviation present. No thyromegaly present.   Cardiovascular: Normal rate.    No murmur heard.  irregular and fast    Pulmonary/Chest: No respiratory distress. She has no wheezes.   Abdominal: Soft. She exhibits no distension. There is no tenderness. There is no rebound.   Neurological: She is alert and oriented to person, place, and time. No cranial nerve deficit. Coordination normal.         Lab Data Review:      2/8/2018  4:07 PM    Recent Labs      02/08/18 0258  02/09/18   0201  02/10/18   0257   SODIUM  133*  133*  132*   POTASSIUM  3.7  3.8  3.3*   CHLORIDE  101  105  101   CO2  24  20  21   BUN  9  10  6*   CREATININE  0.67  0.60  0.49*   MAGNESIUM  1.6  1.8  1.7   CALCIUM  8.2*  7.7*  8.4*       Recent Labs      02/08/18   0258  02/09/18   0201  02/10/18   0257   ALTSGPT  49  30  26   ASTSGOT  25  19  22   ALKPHOSPHAT  50  53  75   TBILIRUBIN  1.9*  3.6*  3.6*   GLUCOSE  165*  151*  137*       Recent Labs      02/08/18 0258  02/09/18   0201  02/10/18   0257   RBC  3.75*  3.28*  3.41*   HEMOGLOBIN  12.6  11.0*  11.4*   HEMATOCRIT  35.1*  30.8*  31.5*   PLATELETCT  175  142*  162*       Recent Labs      02/08/18   0258  02/09/18   0201  02/10/18   0257   WBC  17.2*  8.4  10.0   NEUTSPOLYS  89.00*  59.50  60.30   LYMPHOCYTES  5.10*  6.90*  13.80*   MONOCYTES  4.60  3.40  3.40   EOSINOPHILS  0.00  0.00  0.90   BASOPHILS  0.40  1.70  0.00   ASTSGOT  25  19  22   ALTSGPT  49  30  26   ALKPHOSPHAT  50  53  75   TBILIRUBIN  1.9*  3.6*  3.6*           Assessment/Plan     * Sepsis (CMS-HCC)- (present on admission)   Assessment & Plan    This is sepsis (without associated acute organ dysfunction).   Hx of tooth infection in last 3 months  MRI of her cervical, thoracic, lumbar  spine with and without contrast apparently did  NOT show any evidence of epidural abscesses.   improving on  lactic acid.   Likely secondary to cellulitis/deep neck infection    Blood culture: MSSA on 2/7  Switch IV vancomycin and IV Zosyn to cefazolin on 2/8  Day 3 cefazolin   Echo on 2/9 no signs of endocarditis.   Repeat blood culture on 2/9: negative till now. .  WES  Repeat CT neck 2/10: small abscess  Drainage on 2/10          A-fib (CMS-HCC)   Assessment & Plan    New Dx  developed Afib with RVR on 2/10  Metoprolol  did not work  amiodarone has started.   TSH pending  Cardiology on board, appreciate cardiology rec's        CAP (community acquired pneumonia)- (present on admission)   Assessment & Plan    Possible finding on the chest x-ray  No symptoms   Could be atelectasis   Incentive spirometry   Repeat CXR tomorrow and thoracentesis  if needed.   Follow up.         Hyponatremia- (present on admission)   Assessment & Plan    Continue IV fluids recheck BMP in the morning        Neck pain- (present on admission)   Assessment & Plan    Likely cellulitis   Continue IV antibiotics  reevaluate with new images if needed.         Lactic acidosis- (present on admission)   Assessment & Plan    As a result of sepsis continue IV antibiotics continue to trend lactic until normalization.  Improved.         Hypokalemia- (present on admission)   Assessment & Plan    Replenish electrolytes recheck BMP in the morning          Total time of care  process exceeds 35 minutes of critical care.

## 2018-02-10 NOTE — PROGRESS NOTES
0800 Pt HR up in the 160's while walking to restroom.  A few minutes later back in bed, HR jumped up to 200's several times. MD Ocasio immediately came to bedside, stat 500mL bolus ordered and continuous 125mL, lactic acid, procalcitonin and EKG.   Monitor check called, as she was sustaining 200's.   /88, RR WNL. 92% on 2L.   Dr. Badillo to bedside, received orders for 5mg metoprolol IV. RN administered, HR down to 150's, Afib.   EKG and lab at bedside now.

## 2018-02-10 NOTE — ASSESSMENT & PLAN NOTE
New Dx  developed Afib with RVR on 2/10 up to 170s  Metoprolol did not work  Converted after an hour on amiodarone drip, cards following, switched to oral amio  TSH normal

## 2018-02-10 NOTE — PROGRESS NOTES
Bedside report completed with noc RN. Pt resting, c/o neck and shoulder pain 8/10, she is moaning, face grimacing. Bed locked in low position, call light within reach. Bed alarm on. Reviewed plan of care with noc RN and pt. Patient has no questions at this time.   Per noc RN, pain was an issue and the oxycodone more frequently didn't help much.

## 2018-02-10 NOTE — PROGRESS NOTES
Transfer into CSS340 from Telemetry 7.  Pt able to transfer self bed to bed with minimal assist.  Monitor with A fib rate 150-170. Dr. Forman here.

## 2018-02-10 NOTE — CONSULTS
DATE OF SERVICE:  02/10/2018    PRINCIPAL COMPLAINT:  Neck abscess.    HISTORY OF PRESENT ILLNESS:  This is a 57-year-old female who presented 3 days   ago with right shoulder pain.  At that time, x-rays were obtained that showed   no obvious site or infection.  Over the next several days, she was admitted   and was given antibiotics.  She was also seen by surgery for increasing   tenderness and pain in the right shoulder and concern about her clavicular   sternal joint.  Wednesday the 7th, also the pain got much worse.  A CT scan   was obtained today did show inflammatory changes in the right base of the neck   with subcutaneously a 6.7 mm attenuation the lateral aspect of the right   sternocleidomastoid centered around the neck and a small pleural effusion   visible lymphadenopathy.    PAST MEDICAL HISTORY:  Significant for high blood pressure.    MEDICATIONS:  She takes medication for stated that ALLERGIES:  She has no   known drug allergies.    PHYSICAL EXAMINATION:  GENERAL:  She is lying in the bed with her arm at the outside.  She could not   painful to move her arms.  VITAL SIGNS:  Currently afebrile with pulse of 116.  She is in AFib, blood   pressure is 97/67.  She is satting in the 90s on room air.  HEENT:  Both ears are within normal limits.  The nose is patent.  The mouth is   patent, but a little thick secretions.  No bleeding was seen at the upper   neck.  Soft and supple was get down on to the right lower neck and   supraclavicular area as well as the supraclavicular area.  She is exquisitely   tender, erythematous.    The CT was reviewed showing inflammation of the soft tissues and a small area   of abscess in the sternocleidomastoid.  It was discussed with Dr. Gong, the   general surgeon, and given the fact that this is actually progressively got   worse, I do recommend taking her to the operating room for I and D.  We did   discuss the sternoclavicular joint, that does not currently look  infected, but   may need further treatment at this time, we will just address the neck and   leaving the drain and get cultures as well as irrigation.  The risks and   benefits described to her including bleeding, infection, and she understands   this.  We will get her scheduled as soon as possible.       ____________________________________     MD ROLANDA Alexis / NTS    DD:  02/10/2018 11:36:03  DT:  02/10/2018 12:57:01    D#:  [id number]  Job#:  [id number]

## 2018-02-10 NOTE — CONSULTS
DATE OF SERVICE:  02/10/2018    REFERRING PHYSICIAN: Dr Analilia Velasco    REASON FOR CONSULT:  Atrial fibrillation with RVR.    HISTORY OF PRESENT ILLNESS:  This is a pleasant 57-year-old female who was   admitted on 02/07/2018 with methicillin-sensitive staph aureus sepsis with   evidence of infection near the right shoulder.  She came in with right   shoulder pain and had some fevers, chills, and sweats.  No recent dental work.    No recent surgery.  No history of rheumatic heart disease as a child.    Baseline echocardiogram did not show any obvious vegetation and has been seen   by infectious disease and surgery.  She does not smoke.  Denies alcohol use.    Denies any IV drug use.  Works at [employer], is , does have children, and   lives with her daughter.  No past surgical history.  Past medical history   includes the methicillin-sensitive staph aureus sepsis.  She now has atrial   fibrillation with rapid ventricular response.  No history of atrial   arrhythmias in the past.  No previous history of cardiac disease.  Does have   hypertension.  Denies diabetes.    FAMILY HISTORY:  No family history of heart disease.    SOCIAL HISTORY:  She is originally from [facility].    PAST SURGICAL HISTORY:  Includes vein ligation.    PAST MEDICAL HISTORY:  Includes hypertension, now atrial fibrillation with   RVR, and methicillin-sensitive Staph aureus.    OUTPATIENT MEDICATIONS:  Included amlodipine, prednisone, Flexeril, and   Voltaren.    REVIEW OF SYSTEMS:  CONSTITUTIONAL:  Fevers and chills.  PULMONARY:  No shortness of breath.  CARDIAC:  Negative.  GASTROINTESTINAL:  Negative.  GENITOURINARY:  Negative.  RHEUMATOLOGICAL:  As above.  Rest of review of system negative by the AMA/CMS   criteria.    CURRENT INPATIENT MEDICATIONS:  Including starting him on an amiodarone drip,   Flexeril, Toradol, heparin subQ, metoprolol p.r.n., pain medications, and   cefazolin.    PHYSICAL EXAMINATION:  GENERAL:  This is a female in  no acute distress.  VITAL SIGNS:  Blood pressure is 91/74, pulse is 140, respirations 16.  HEENT:  JVD 7 cm.  Carotids without bruits.  LUNGS:  Fairly clear.  CARDIAC:  Tachycardic.  Normal S1, S2, irregularly irregular without murmurs,   rubs, or gallops.  ABDOMEN:  Soft, nontender without hepatosplenomegaly.  EXTREMITIES:  Without clubbing, cyanosis, or edema.  Evidence of some swelling   and pain over the right shoulder.  PSYCHIATRIC:  Alert and oriented x3.  Mood and affect appropriate.  SKIN:  Turgor is normal.    LABORATORY DATA:  Include white count 10.0, hemoglobin 11.4, hematocrit 31.5,   and platelet count 162,000.  Chemistries, CMP unremarkable except for slightly   low potassium at 3.3 and lactic acid 1.3.  No TFTs have been ordered, but   that was normal in 2014.  Blood cultures positive for MSSA.  CT scan of the   shoulder, inflammatory changes near the right aspect of the neck, right   sternocleidomastoid area with joint effusion.  MRI of the spine unremarkable.    Electrocardiogram shows atrial fibrillation with RVR.  Nonspecific ST-T wave   changes.  Echocardiogram was reviewed by me, no obvious vegetation seen.  PA   pressures of 45, otherwise normal.    ASSESSMENT:  1.  Atrial fibrillation with rapid ventricular response in the setting of   Staph aureus sepsis.  Patient is being appropriately treated with antibiotics   and has been seen by infectious disease.  She has evidence of infection in the   right shoulder area we would reccomend transesophageal echocardiogram   to see if there is any vegetation.  This can be done over the next a day or 2.  2.  Atrial fibrillation with rapid ventricular response, amiodarone has been   started appropriately.  Switch subQ heparin to IV heparin, IV metoprolol   P.r.n. Check TSH.  3.  Hypertension.       ____________________________________     MD PALMER MENENDEZ / NTS    DD:  02/10/2018 10:46:39  DT:  02/10/2018 11:56:53    D#:  [id number]  Job#:   [id number]

## 2018-02-10 NOTE — CARE PLAN
Problem: Infection  Goal: Will remain free from infection    Intervention: Assess signs and symptoms of infection  Assessing closely for s/s of worsening infection. Vitals q4, and additionally as needed. Encouraged pt on using alcohol hand wipes before eating meals, and after toileting  Used scrupulous hand hygiene, and sterile technique when administering IV medications.         Problem: Respiratory:  Goal: Respiratory status will improve    Intervention: Administer and titrate oxygen therapy  Titrated oxygen to acceptable level, pt requiring 1-2 liters, is SOB when returning from restroom.   Encouraged use of IS and coughing and deep breathing.

## 2018-02-10 NOTE — OR SURGEON
Immediate Post OP Note    PreOp Diagnosis: Right neck abscess    PostOp Diagnosis: Right neck phglem    Procedure(s):  INCISION AND DRAINAGE NECK - Wound Class: Dirty or Infected    Surgeon(s):  Millie Perez M.D.    Anesthesiologist/Type of Anesthesia:  Anesthesiologist: Chidi Miller M.D./General    Surgical Staff:  Circulator: Dean Riggs R.N.  Scrub Person: Ugo Mitchell    Specimens:  Culture sent    Estimated Blood Loss: minimal    Findings: indurated and mascerated tissues    Complications: none        2/10/2018 1:21 PM Millie Perez

## 2018-02-10 NOTE — PROGRESS NOTES
Pt c/o 7/10 pain in R shoulder. Medicated per MAR. Discussed WES procedures. Pt expresses anxiety. Educated pt that nurse can answer any questions she has after the doctor comes to see her. Pt verbalized understanding, appeared relieved. No other needs voiced at this time. Call light within reach. Will continue to monitor.

## 2018-02-10 NOTE — PROGRESS NOTES
RAPHAEL Mccullough transferred belongings to the patient in her room in 612, including cell phone, eye glasses, bags, clothes, and two flower vases.     RN spoke with Yarely, the daughter and updated her on the transfer to [facility].

## 2018-02-10 NOTE — PROGRESS NOTES
"Progress Note:  2/9/2018, 5:15 PM    S: No acute events.  Reports shoulder pain is better, but still present.  TTE negative, WES pending    O:  Blood pressure 134/89, pulse (!) 101, temperature 37.7 °C (99.9 °F), resp. rate 18, height 1.549 m (5' 1\"), weight 74.3 kg (163 lb 12.8 oz), SpO2 96 %, not currently breastfeeding.    -NAD, awake, alert  -R shoulder medial to acromion process remains most tender. She does have erythema extending to just superior to the clavicle.  It does not extend to the midline, but nearly.  No tenderness over SC joint at this time.      A:   Active Hospital Problems    Diagnosis   • Sepsis (CMS-HCC) [A41.9]     Priority: High   • Hypokalemia [E87.6]   • Lactic acidosis [E87.2]   • Neck pain [M54.2]   • Hyponatremia [E87.1]   • CAP (community acquired pneumonia) [J18.9]     RUE/shoulder cellulitis  MSSA bacteremia    P:   -I will continue to follow her exam  -continue antibiotics  -Recommend repeat imaging to check for abscess if clinical worsening or no continued clinical improvement  -please contact me with questions/concerns    Gavin Gong M.D.  [facility]  [phone]    "

## 2018-02-10 NOTE — PROGRESS NOTES
Pt off floor to CT with 3 Rapid RN's, on O2 and on zoll.   Report given to BRITTNEY Frias in [facility]. All questions answered.  Some belongings still in the room, I will make sure they are delivered down to her new room. Updated Altagracia on this.

## 2018-02-10 NOTE — PROGRESS NOTES
Report received at bedside, patient care assumed, tele box on. Pt AAO x 4, no signs of distress noted at this time. POC discussed with pt and all questions answered. Pt c/o 7/10 pain in R shoulder, neck, head. Medicated per MAR.  Pt denies any additional needs at this time. Bed in lowest position, bed alarm off, pt calls appropriately. Call light and personal possessions within reach. Will continue to monitor.

## 2018-02-10 NOTE — CARE PLAN
Problem: Communication  Goal: The ability to communicate needs accurately and effectively will improve    Intervention: [place] patient and significant other/support system to call light to alert staff of needs  Transfer in to ICU.      Problem: Safety  Goal: Will remain free from injury    Intervention: Provide assistance with mobility  Educate to use call light.

## 2018-02-10 NOTE — OP REPORT
DATE OF SERVICE:  02/10/2018    PREOPERATIVE DIAGNOSIS:  Right neck abscess.    POSTOPERATIVE DIAGNOSIS:  Right neck phlegmon.    SURGEON:  Millie Perez MD    ANESTHESIOLOGIST:  Chidi Miller MD    PROCEDURE:  Incision and drainage of right neck.    FINDINGS:  Indurated and macerated tissue.    PROCEDURE IN DETAIL:  The patient was appropriately identified and was taken   to the operating room where he was laid in supine, she was lying in spine   position.  General anesthesia was induced and endotracheal tube was placed.    The patient was positioned with a shoulder roll under shoulder and head   extended to the left.  The right side of her neck was indurated and   erythematous.  At this time, 1% lidocaine was injected just over the clavicle   on the right side of the neck, a total of 3 mL was used.  The patient was then   prepped and draped in sterile fashion.  Using a #10 blade, an incision was   made in a curvilinear through a neck crease along the right side and lateral   to the SCM and taken down through the subcutaneous tissues.  Almost   immediately just very indurated macerated tissue could be seen and anterior   vessel was seen.  This was identified, isolated using mosquito dissector,   clamped, divided and tied using 3-0 silk ties.  Further dissection was carried   down onto the SCM using a hemostat.  This was carried down anteriorly toward   the anterior portion of the clavicle and anterior sternocleidomastoid area   anteriorly where the concern that infection and small abscess in the SCM was   dissected.  No obvious purulence was obtained, but some just milky fluid was   seen.  This was cultured dissection was taken back posteriorly to SCM and   down.  Posteriorly, there was just indurated macerated tissue and this was   further taken behind the clavicle where there was erythema, but no purulent   drainage.  There was a small bit of bleeding from the upper portion   posteriorly of the  wound.  This was stopped using mosquito and Bovie.  No   other vessel was seen posteriorly.  This was isolated, clamped and divided and   tied using 3-0 silk ties.  The area was irrigated several times.  No further   bleeding was seen.  A Penrose was placed in the wound.  This was sutured in   using a 3-0 nylon and the incision was closed interrupted with 3-0 nylon.  The   patient was unprepped and draped, 4x4 was placed over the incision.  She was   awakened, extubated, and returned to recovery in stable satisfactory   condition.       ____________________________________     MD ROLANDA Alexis / JOSEFINA    DD:  02/10/2018 13:25:04  DT:  02/10/2018 14:11:14    D#:  [id number]  Job#:  [id number]

## 2018-02-11 PROBLEM — L02.11 NECK ABSCESS: Status: ACTIVE | Noted: 2018-02-07

## 2018-02-11 LAB
GRAM STN SPEC: NORMAL
SIGNIFICANT IND 70042: NORMAL
SITE SITE: NORMAL
SOURCE SOURCE: NORMAL

## 2018-02-11 PROCEDURE — 700111 HCHG RX REV CODE 636 W/ 250 OVERRIDE (IP): Performed by: HOSPITALIST

## 2018-02-11 PROCEDURE — 700102 HCHG RX REV CODE 250 W/ 637 OVERRIDE(OP): Performed by: HOSPITALIST

## 2018-02-11 PROCEDURE — 770020 HCHG ROOM/CARE - TELE (206)

## 2018-02-11 PROCEDURE — A9270 NON-COVERED ITEM OR SERVICE: HCPCS | Performed by: HOSPITALIST

## 2018-02-11 PROCEDURE — A9270 NON-COVERED ITEM OR SERVICE: HCPCS | Performed by: INTERNAL MEDICINE

## 2018-02-11 PROCEDURE — 700102 HCHG RX REV CODE 250 W/ 637 OVERRIDE(OP): Performed by: INTERNAL MEDICINE

## 2018-02-11 PROCEDURE — 700111 HCHG RX REV CODE 636 W/ 250 OVERRIDE (IP): Performed by: INTERNAL MEDICINE

## 2018-02-11 PROCEDURE — 700105 HCHG RX REV CODE 258: Performed by: INTERNAL MEDICINE

## 2018-02-11 PROCEDURE — 99233 SBSQ HOSP IP/OBS HIGH 50: CPT | Performed by: HOSPITALIST

## 2018-02-11 RX ORDER — OXYCODONE HYDROCHLORIDE 10 MG/1
10 TABLET ORAL
Status: DISCONTINUED | OUTPATIENT
Start: 2018-02-11 | End: 2018-02-16 | Stop reason: HOSPADM

## 2018-02-11 RX ORDER — HYDROMORPHONE HYDROCHLORIDE 2 MG/ML
0.5 INJECTION, SOLUTION INTRAMUSCULAR; INTRAVENOUS; SUBCUTANEOUS
Status: DISCONTINUED | OUTPATIENT
Start: 2018-02-11 | End: 2018-02-16 | Stop reason: HOSPADM

## 2018-02-11 RX ORDER — POTASSIUM CHLORIDE 20 MEQ/1
40 TABLET, EXTENDED RELEASE ORAL 2 TIMES DAILY
Status: COMPLETED | OUTPATIENT
Start: 2018-02-11 | End: 2018-02-11

## 2018-02-11 RX ORDER — OXYCODONE HYDROCHLORIDE 5 MG/1
5 TABLET ORAL
Status: DISCONTINUED | OUTPATIENT
Start: 2018-02-11 | End: 2018-02-16 | Stop reason: HOSPADM

## 2018-02-11 RX ADMIN — FAMOTIDINE 20 MG: 20 TABLET, FILM COATED ORAL at 08:09

## 2018-02-11 RX ADMIN — AMIODARONE HYDROCHLORIDE 200 MG: 200 TABLET ORAL at 20:11

## 2018-02-11 RX ADMIN — POTASSIUM CHLORIDE 40 MEQ: 1500 TABLET, EXTENDED RELEASE ORAL at 09:13

## 2018-02-11 RX ADMIN — HEPARIN SODIUM 5000 UNITS: 5000 INJECTION, SOLUTION INTRAVENOUS; SUBCUTANEOUS at 14:48

## 2018-02-11 RX ADMIN — CEFAZOLIN SODIUM 2 G: 2 INJECTION, SOLUTION INTRAVENOUS at 22:54

## 2018-02-11 RX ADMIN — OXYCODONE HYDROCHLORIDE 5 MG: 5 TABLET ORAL at 07:55

## 2018-02-11 RX ADMIN — CEFAZOLIN SODIUM 2 G: 2 INJECTION, SOLUTION INTRAVENOUS at 05:01

## 2018-02-11 RX ADMIN — SODIUM CHLORIDE: 9 INJECTION, SOLUTION INTRAVENOUS at 06:10

## 2018-02-11 RX ADMIN — CEFAZOLIN SODIUM 2 G: 2 INJECTION, SOLUTION INTRAVENOUS at 14:48

## 2018-02-11 RX ADMIN — METOPROLOL TARTRATE 25 MG: 25 TABLET, FILM COATED ORAL at 20:11

## 2018-02-11 RX ADMIN — METOPROLOL TARTRATE 25 MG: 25 TABLET, FILM COATED ORAL at 08:09

## 2018-02-11 RX ADMIN — CYCLOBENZAPRINE 10 MG: 10 TABLET, FILM COATED ORAL at 07:56

## 2018-02-11 RX ADMIN — HEPARIN SODIUM 5000 UNITS: 5000 INJECTION, SOLUTION INTRAVENOUS; SUBCUTANEOUS at 05:02

## 2018-02-11 RX ADMIN — OXYCODONE HYDROCHLORIDE 10 MG: 10 TABLET ORAL at 16:57

## 2018-02-11 RX ADMIN — OXYCODONE HYDROCHLORIDE 10 MG: 10 TABLET ORAL at 20:11

## 2018-02-11 RX ADMIN — OXYCODONE HYDROCHLORIDE 5 MG: 5 TABLET ORAL at 13:39

## 2018-02-11 RX ADMIN — STANDARDIZED SENNA CONCENTRATE AND DOCUSATE SODIUM 2 TABLET: 8.6; 5 TABLET, FILM COATED ORAL at 20:10

## 2018-02-11 RX ADMIN — HYDROMORPHONE HYDROCHLORIDE 0.25 MG: 2 INJECTION INTRAMUSCULAR; INTRAVENOUS; SUBCUTANEOUS at 09:13

## 2018-02-11 RX ADMIN — AMIODARONE HYDROCHLORIDE 200 MG: 200 TABLET ORAL at 08:09

## 2018-02-11 RX ADMIN — HEPARIN SODIUM 5000 UNITS: 5000 INJECTION, SOLUTION INTRAVENOUS; SUBCUTANEOUS at 22:25

## 2018-02-11 RX ADMIN — POTASSIUM CHLORIDE 40 MEQ: 1500 TABLET, EXTENDED RELEASE ORAL at 20:10

## 2018-02-11 ASSESSMENT — PAIN SCALES - GENERAL
PAINLEVEL_OUTOF10: 8
PAINLEVEL_OUTOF10: 0
PAINLEVEL_OUTOF10: 2
PAINLEVEL_OUTOF10: 2
PAINLEVEL_OUTOF10: 9
PAINLEVEL_OUTOF10: 2
PAINLEVEL_OUTOF10: 9
PAINLEVEL_OUTOF10: 2
PAINLEVEL_OUTOF10: 0
PAINLEVEL_OUTOF10: 8
PAINLEVEL_OUTOF10: 6
PAINLEVEL_OUTOF10: 10

## 2018-02-11 ASSESSMENT — ENCOUNTER SYMPTOMS
MYALGIAS: 1
VOMITING: 0
DIZZINESS: 0
CHILLS: 0
ABDOMINAL PAIN: 0
NECK PAIN: 1
PALPITATIONS: 0
FEVER: 0
BACK PAIN: 1
SENSORY CHANGE: 0
MYALGIAS: 0
NAUSEA: 0
SPEECH CHANGE: 0
COUGH: 0
HEADACHES: 0
SHORTNESS OF BREATH: 0

## 2018-02-11 ASSESSMENT — PATIENT HEALTH QUESTIONNAIRE - PHQ9
1. LITTLE INTEREST OR PLEASURE IN DOING THINGS: NOT AT ALL
2. FEELING DOWN, DEPRESSED, IRRITABLE, OR HOPELESS: NOT AT ALL
SUM OF ALL RESPONSES TO PHQ9 QUESTIONS 1 AND 2: 0
SUM OF ALL RESPONSES TO PHQ QUESTIONS 1-9: 0

## 2018-02-11 ASSESSMENT — PAIN SCALES - WONG BAKER: WONGBAKER_NUMERICALRESPONSE: HURTS JUST A LITTLE BIT

## 2018-02-11 NOTE — CARE PLAN
Problem: Safety  Goal: Will remain free from injury  Outcome: PROGRESSING AS EXPECTED  Compliant with using call light.  Bed alarm on.

## 2018-02-11 NOTE — PROGRESS NOTES
Baby nursing well. Good latch. Voiding and stooling well. Vital signs within normal limits. Infectious Disease Progress Note    Author: Brandi Trent M.D. Date & Time of service: 2/10/2018  5:00 PM    Chief Complaint:  Staph aureus bacteremia    Interval History:  2018-MAXIMUM TEMPERATURE 103.8 wbc 8.4 platelets 142 creatinine 0.6 says she feels slightly better  2/10/2018-transferred to ICU for A. nurys with RVR MAXIMUM TEMPERATURE 99.7 underwent drainage of the right neck abscess on 2/10/2018 WBC 10.   Labs Reviewed, Medications Reviewed and Radiology Reviewed.    Review of Systems:  Review of Systems   Constitutional: Positive for chills, fever and malaise/fatigue.   HENT: Negative for hearing loss.         Calvert of neck pain   Respiratory: Negative for cough and shortness of breath.    Cardiovascular: Negative for chest pain and leg swelling.   Gastrointestinal: Negative for abdominal pain, nausea and vomiting.   Genitourinary: Negative for dysuria.   Musculoskeletal: Positive for myalgias.   Neurological: Negative for sensory change and speech change.       Hemodynamics:  Temp (24hrs), Av.1 °C (98.7 °F), Min:36.3 °C (97.4 °F), Max:37.6 °C (99.7 °F)  Temperature: 36.4 °C (97.6 °F)  Pulse  Av.6  Min: 70  Max: 180Heart Rate (Monitored): 72  Blood Pressure: 108/79, NIBP: 107/71       Physical Exam:  Physical Exam   Constitutional: She is oriented to person, place, and time. No distress.   Looks ill   HENT:   Mouth/Throat: No oropharyngeal exudate.   Eyes: No scleral icterus.   Neck:   Right neck area bandaged   Cardiovascular:   No murmur heard.  IRR   Pulmonary/Chest: She has no rales.   Area of erythema right shoulder and clavicle area extending to the chest sparing the shoulder joint itself   Abdominal: There is no tenderness. There is no rebound.   Musculoskeletal: She exhibits no edema.   Right shoulder today and less tender and moving   Neurological: She is alert and oriented to person, place, and time.   Vitals reviewed.      Meds:    Current Facility-Administered Medications:   •   NS  •  NS  •  metoprolol  •  Metoprolol Tartrate  •  Metoprolol Tartrate  •  amiodarone  •  heparin  •  ceFAZolin  •  ketorolac  •  famotidine  •  cyclobenzaprine  •  NS  •  senna-docusate **AND** polyethylene glycol/lytes **AND** magnesium hydroxide **AND** bisacodyl  •  Respiratory Care per Protocol  •  NS  •  acetaminophen  •  Notify provider if pain remains uncontrolled **AND** Use the numeric rating scale (NRS-11) on regular floors and Critical-Care Pain Observation Tool (CPOT) on ICUs/Trauma to assess pain **AND** Pulse Ox (Oximetry) **AND** Pharmacy Consult Request **AND** If patient difficult to arouse and/or has respiratory depression, stop any opiates that are currently infusing and call a Rapid Response. **AND** oxyCODONE immediate-release **AND** oxyCODONE immediate-release **AND** HYDROmorphone    Labs:  Recent Labs      02/08/18   0258  02/09/18   0201  02/10/18   0257   WBC  17.2*  8.4  10.0   RBC  3.75*  3.28*  3.41*   HEMOGLOBIN  12.6  11.0*  11.4*   HEMATOCRIT  35.1*  30.8*  31.5*   MCV  93.6  93.9  92.4   MCH  33.6*  33.5*  33.4*   RDW  45.7  47.2  46.7   PLATELETCT  175  142*  162*   MPV  9.3  9.6  10.1   NEUTSPOLYS  89.00*  59.50  60.30   LYMPHOCYTES  5.10*  6.90*  13.80*   MONOCYTES  4.60  3.40  3.40   EOSINOPHILS  0.00  0.00  0.90   BASOPHILS  0.40  1.70  0.00   RBCMORPHOLO   --   Present  Present     Recent Labs      02/08/18   0258  02/09/18   0201  02/10/18   0257   SODIUM  133*  133*  132*   POTASSIUM  3.7  3.8  3.3*   CHLORIDE  101  105  101   CO2  24  20  21   GLUCOSE  165*  151*  137*   BUN  9  10  6*     Recent Labs      02/08/18   0258  02/09/18   0201  02/10/18   0257   ALBUMIN  3.8  2.8*  3.1*   TBILIRUBIN  1.9*  3.6*  3.6*   ALKPHOSPHAT  50  53  75   TOTPROTEIN  6.5  5.2*  5.8*   ALTSGPT  49  30  26   ASTSGOT  25  19  22   CREATININE  0.67  0.60  0.49*       Imaging:  Ct-soft Tissue Neck With    Result Date: 2/7/2018 2/7/2018 5:01 PM HISTORY/REASON FOR EXAM: Constant right  shoulder pain. TECHNIQUE/EXAM DESCRIPTION AND NUMBER OF VIEWS:  CT soft tissue neck with contrast. The study was performed on a helical multidetector CT scanner. Contiguous thin section helical images were obtained of the neck from the skull base through the thoracic inlet. 80 mL of Omnipaque 350 nonionic contrast was injected intravenously. Low dose optimization technique was utilized for this CT exam including automated exposure control and adjustment of the mA and/or kV according to patient size. COMPARISON: None. FINDINGS: There are no mass lesions or fluid collections in the deep or superficial soft tissues of the neck. There is no abnormal enhancement. Cervical lymph nodes show normal size and configuration. There is no pathologic adenopathy evident. Vascular enhancement  of the cervical carotid and vertebral arteries and internal jugular veins appears intact. The nasopharynx, oropharynx, and hypopharynx show normal airways and no abnormal soft tissue swelling. The larynx and trachea are unremarkable. The prevertebral soft tissues appear intact. The parapharyngeal spaces show normal symmetry and fat planes. The pterygopalatine and infratemporal fossae appear intact. The tongue and floor of the mouth are unremarkable. The submandibular, sublingual, and submental spaces appear intact. The parotid and submandibular glands show normal size and density. No abnormal calcifications are evident. The thyroid gland it is enlarged and appears homogeneous. The remaining structures at the thoracic inlet and superior mediastinum within the field of view are unremarkable. There is minimal posterior disc space narrowing and posterior osteophytic spurring at C5-6 and C6-7. No intracranial abnormalities are evident.     1.  No acute inflammatory process identified in the soft tissues of the neck. 2.  Thyroid gland enlargement. 3.  Minor degenerative changes posteriorly at C5-6 and C6-7.    Dx-chest-portable (1 View)    Result  Date: 2/9/2018 2/9/2018 6:05 AM HISTORY/REASON FOR EXAM: Chest Pain TECHNIQUE/EXAM DESCRIPTION:  Single AP view of the chest. COMPARISON: None FINDINGS: Overlying cardiac leads are present. The cardiac silhouette appears within normal limits. Atherosclerotic calcification of the aorta is noted.  The central  pulmonary vasculature appears prominent and indistinct. The lungs appear well expanded bilaterally.  Diffuse scattered hazy pulmonary parenchymal opacities are seen. Veil-like opacities are seen in the right lung base. The bony structures appear age-appropriate.     1.  Pulmonary edema and/or infiltrates. 2.  Small layering right pleural effusion 3.  Atherosclerosis    Dx-shoulder 2+ Right    Result Date: 2/6/2018 2/6/2018 9:05 AM HISTORY/REASON FOR EXAM:  Atraumatic Pain/Swelling/Deformity Acute pain of right shoulder after GLF 2 days ago TECHNIQUE/EXAM DESCRIPTION AND NUMBER OF VIEWS:  3 views of the RIGHT shoulder. COMPARISON: None FINDINGS: No acute fracture or dislocation. Mild osteoarthritis of the AC joint and glenohumeral joint     1. No acute osseous abnormality.    Mr-cervical Spine-with & W/o    Result Date: 2/8/2018 2/7/2018 7:03 PM HISTORY/REASON FOR EXAM:  Atraumatic pain, paresis. Right shoulder pain, headache. TECHNIQUE/EXAM DESCRIPTION: MRI of the cervical spine without and with contrast. The study was performed on a [employer] Signa 1.5 Aubrie MRI scanner. T1 sagittal, T2 fast spin-echo sagittal, T1 postcontrast fat-suppressed sagittal, and gradient-echo axial images were obtained of the cervical spine. 15 mL Omniscan contrast were administered  intravenously. COMPARISON:  None. FINDINGS:  There is minimal grade 1 retrolisthesis of and C5 on 6. There is mild and vertebral disc space narrowing at C5-6. The disk spaces are well-maintained and have a normal appearance. The cervical cord has a normal caliber, course and signal intensity. There is no definite evidence of abnormal enhancement in the  cervical cord or spine. Level specific findings as follows: C2-C3: No significant central canal or neural foraminal stenosis. C3-C4: No significant central canal or neural foraminal stenosis. C4-C5: There is mild posterior midline disc bulge which effaces anterior thecal sac. There is borderline central canal stenosis. C5-C6: There is mild broad posterior disc bulge and endplate spurring. There is mild ligamentum flavum hypertrophy. There is mild central canal stenosis. There is mild to moderate right and moderate left neural foraminal stenosis secondary to endplate spurring and facet arthropathy. C6-C7: There is mild broad posterior disc bulge. There is mild ligamentum flavum hypertrophy. There is mild central canal stenosis. There is moderate left neural foraminal stenosis secondary to disc bulge and facet arthropathy. C7-T1: No significant central canal or neural foraminal stenosis. The visualized prevertebral and posterior paraspinous soft tissues are grossly unremarkable.     1.  No acute abnormality or abnormal enhancement in the cervical spine. If there is strong clinical suspicion for discitis/osteomyelitis or epidural infectious process, short interval repeat imaging is recommended. 2.  Multilevel degenerative changes of the cervical spine as described above.    Mr-lumbar Spine-with & W/o    Result Date: 2/8/2018 2/7/2018 7:03 PM HISTORY/REASON FOR EXAM:  Atraumatic back pain. TECHNIQUE/EXAM DESCRIPTION: MRI of the lumbar spine without and with contrast. The study was performed on a [employer] Signa 1.5 Aubrie MRI scanner. T1 sagittal, T2 fast spin-echo sagittal, and T2 axial images were obtained of the lumbar spine. T1 postcontrast fat-suppressed sagittal images were obtained. 15 mL Omniscan contrast was administered intravenously. COMPARISON:  None. FINDINGS: Marrow signal intensity is normal. There is no abnormal enhancement. Vertebral body heights are preserved. Vertebral alignment is normal. There is mild  intervertebral disc space narrowing at L5-S1 with decrease of the normal T2 signal intensity within the disc space consistent with mild degenerative disc desiccation. The spinal cord demonstrates normal course and caliber. There is no abnormal cord enhancement. Conus terminates at the T12/L1 level. Level-specific findings as follows: L5-S1: There is mild broad posterior disc bulge. There is mild facet arthropathy. There is mild bilateral neural foraminal stenosis. L4-5: There is mild diffuse disc bulge. There is minimal bilateral neural foraminal stenosis. L3-4: No significant central canal or neural foraminal stenosis. L2-3: No significant central canal or neural foraminal stenosis. L1-2: No significant central canal or neural foraminal stenosis. The visualized prevertebral and posterior paraspinous soft tissues are grossly unremarkable.     1.  No acute abnormality or abnormal enhancement in the lumbar spine. If there is strong clinical suspicion for discitis/osteomyelitis or epidural infectious process, short interval repeat imaging is recommended. 2.  Multilevel degenerative changes of the lumbar spine as described above.    Mr-shoulder-w/o Right    Addendum Date: 2/8/2018    I further discussed this case with Dr. Badillo at 4:20 PM on 2/8/2018. The patient is exquisitely tender over the medial clavicle. That area is not included on this study. There is however likely some edema and induration of the soft tissues surrounding the mid and medial clavicle at the edge of the images. Review of CT scan of the neck and chest from the same date demonstrates induration of the soft tissues surrounding the right medial clavicle as well as centered in the area of the sternoclavicular joint. These findings would be suspicious for septic arthritis/osteomyelitis of the right sternoclavicular joint.    Result Date: 2/8/2018 2/7/2018 7:03 PM HISTORY/REASON FOR EXAM: Right-sided shoulder pain TECHNIQUE/EXAM DESCRIPTION: MRI of  the RIGHT shoulder without contrast. Using a [employer] Signa 1.5 Aubrie MRI scanner, T1 sagittal, fast spin-echo T2 fat-suppressed oblique coronal, sagittal, and axial and intermediate fast spin-echo oblique coronal images were obtained. COMPARISON: None. FINDINGS: Osseous acromial outlet: The acromion demonstrates a curved undersurface. There is no lateral downsloping. There is no evidence of an inferiorly directed subacromial enthesophyte. There is mild degenerative change of the acromioclavicular joint. There are no inferiorly directed osteophytes. There is minimal fluid seen within the subacromial/subdeltoid bursa. Rotator cuff: There is tendinopathy involving the supraspinatus tendon with mild fraying of the bursal surface fibers. No evidence of full-thickness tear or retraction. Glenoid labrum: There is no definite superior labral tear or evidence of disruption of the biceps anchor. There is a multiloculated fluid signal focus seen anterior to the subscapularis tendon which measures 2.4 x 1.4 x 1.1 cm in size. This extends towards the anterior/superior labrum. The anterior/inferior and posterior/inferior glenoid labrum are intact. Osseous structures/Cartilaginous surfaces: No evidence of marrow edema.  Cartilaginous surfaces are well maintained. Miscellaneous: No significant joint effusion. The long head of the biceps tendon is appropriately situated within the bicipital groove.     1.  Mild tendinopathy of the supraspinatus tendon with very mild fraying of the bursal surface fibers. No evidence of full-thickness tear or retraction. 2.  No definite tear of the glenoid labrum. There is however a multiloculated fluid signal focus present anterior to the subscapularis tendon which extends towards the anterior/superior labrum. This may represent a ganglion cyst but can also potential he  represent a paralabral cyst related to nonvisualized superior labral tear. 3.  Mild degenerative change of the acromioclavicular  joint.    Mr-thoracic Spine-with & W/o    Result Date: 2018 7:03 PM HISTORY/REASON FOR EXAM: Atraumatic back and neck pain. TECHNIQUE/EXAM DESCRIPTION: MRI of the thoracic spine without and with contrast. The study was performed on a [employer] Signa 1.5 Aubrie MRI scanner. T1 sagittal, T1 postcontrast fat-suppressed sagittal, T2 sagittal, and T2 axial images were obtained of the thoracic spine. 15 mL Omniscan contrast were administered intravenously. COMPARISON:  None FINDINGS: The thoracic vertebral bodies have a normal height and alignment. The thoracic disks have a normal appearance and signal intensity. The thoracic cord has a normal caliber, course and signal intensity. There is no evidence of abnormal enhancement in the thoracic cord or spine. There is no evidence of disk extrusion, cord compression, central canal stenosis, or neural foraminal stenosis. There is atelectasis and/or consolidation in the visualized portion of the right lung base.     1.  No acute abnormality or abnormal enhancement in the thoracic spine. If there is strong clinical suspicion for discitis/osteomyelitis or epidural infectious process, short interval repeat imaging is recommended. 2.  Possible right lung base pneumonitis.    Echocardiogram Comp W/o Cont    Result Date: 2018  Transthoracic Echo Report Echocardiography Laboratory CONCLUSIONS No prior study is available for comparison. Normal left ventricular chamber size. Left ventricular ejection fraction is visually estimated to be 60%. No significant valve abnormalities. Estimated right ventricular systolic pressure is 45 mmHg. JESSICA ANDRADE Exam Date:         2018                    13:18 Exam Location:     Inpatient Priority:          Routine Ordering Physician:        SENIA CHANG Referring Physician:       CHARLENE Foster Sonographer:               Kristin Dykes RDCS Age:    57     Gender:    F MRN:    [id number] :    1960 BSA:    1.68   Ht  (in):    61     Wt (lb):    151 Exam Type:     Complete Indications:     Fever ICD Codes:       780.6 CPT Codes:       76765 BP:   104    /   75     HR:   70 Technical Quality:       Fair MEASUREMENTS  (Male / Female) Normal Values 2D ECHO LV Diastolic Diameter PLAX        3.9 cm                4.2 - 5.9 / 3.9 - 5.3 cm LV Systolic Diameter PLAX         2.4 cm                2.1 - 4.0 cm IVS Diastolic Thickness           0.98 cm               LVPW Diastolic Thickness          1 cm                  RV Diameter 4C                    2.3 cm                2.5 - 2.9 cm LVOT Diameter                     1.8 cm                RA Diameter                       2.8 cm                Estimated LV Ejection Fraction    60 %                  LV Ejection Fraction MOD BP       60.6 %                >= 55  % LV Ejection Fraction MOD 4C       63.1 %                LV Ejection Fraction MOD 2C       56.9 %                LA Volume Index                   21 cm³/m²             16 - 28 cm³/m² IVC Diameter                      1.2 cm                M-MODE Aortic Root Diameter MM           3 cm                  DOPPLER AV Peak Velocity                  1.4 m/s               AV Peak Gradient                  8.2 mmHg              AV Mean Gradient                  4 mmHg                LVOT Peak Velocity                1.2 m/s               AV Area Cont Eq vti               2.1 cm²               MV Velocity Time Integral         25 cm                 Mitral E Point Velocity           1.2 m/s               Mitral E to A Ratio               1.1                   Mitral A Duration                 104 ms                MV Pressure Half Time             40.7 ms               MV Area PHT                       5.4 cm²               MV Deceleration Time              143 ms                Pulmonary Vein Systolic Velocity  0.45 m/s              Pulmonary Vein Diastolic Velocit  0.41 m/s              Pulmonary Vein S/D Ratio          1.1                    Pulmonary Vein A Velocity         0.5 m/s               Pulmonary Vein A Duration         157 ms                Pulm Vein-MV A Duration           53.1 ms               TV Peak E Velocity                0.52 m/s              TR Peak Velocity                  299 cm/s              PV Peak Velocity                  0.95 m/s              PV Peak Gradient                  3.6 mmHg              RVOT Peak Velocity                0.74 m/s              * Indicates values subject to auto-interpretation LV EF:  60    % FINDINGS Left Ventricle Normal left ventricular chamber size. Normal left ventricular wall thickness. Normal left ventricular systolic function. Left ventricular ejection fraction is visually estimated to be 60%. Normal regional wall motion. Normal diastolic function. Right Ventricle The right ventricle was normal in size and function. Right Atrium The right atrium is normal in size. Normal inferior vena cava size and inspiratory collapse. Left Atrium The left atrium is normal in size. Left atrial volume index is 21 mL/sq m. Mitral Valve Structurally normal mitral valve without significant stenosis or regurgitation. Aortic Valve Structurally normal aortic valve without significant stenosis or regurgitation. Tricuspid Valve Structurally normal tricuspid valve. No tricuspid stenosis. Trace tricuspid regurgitation. Estimated right ventricular systolic pressure is 45 mmHg. Pulmonic Valve Structurally normal pulmonic valve without significant stenosis or regurgitation. Pericardium Normal pericardium without effusion. Aorta The aortic root is normal. Ascending aorta diameter is 2.9 cm. Vinnie Naqvi MD (Electronically Signed) Final Date:     09 February 2018                 14:50    Ct-cta Chest Pulmonary Artery W/ Recons    Result Date: 2/7/2018 2/7/2018 5:01 PM HISTORY/REASON FOR EXAM:  Chest pain. TECHNIQUE/EXAM DESCRIPTION: CT angiogram scan for pulmonary embolism with contrast, with reconstructions. 1.25  mm helical sections were obtained from the lung apices through the lung bases following the rapid bolus administration of 80 mL of Omnipaque 350 nonionic contrast. Thin-section overlapping reconstruction interval was utilized. Coronal reconstructions were generated from the axial data. MIP post processing was performed and utilized for the interpretation. Low dose optimization technique was utilized for this CT exam including automated exposure control and adjustment of the mA and/or kV according to patient size. COMPARISON: None FINDINGS: Pulmonary Embolism: No. Main Pulmonary Arteries: No. Segmental branches: No. Subsegmental branches: No. Additional Comments: The heart is normal in size and configuration. Lungs: There is minimal atelectasis or pneumonia in the posterior aspect of the right lower lobe and minimally in the anterior inferior aspect of the lingula. The remainder of the lung parenchyma is clear. Pleura: No pleural effusion. Nodes: No enlarged lymph nodes. Additional findings: There is mild enlargement of both adrenal glands. No hydronephrosis is visualized     1.  No evidence of pulmonary embolism. 2.  Minimal atelectasis or pneumonia in the right lower lobe and in the lingula. 3.  Mild bilateral adrenal gland enlargement. Significance unknown.       Micro:  Results     Procedure Component Value Units Date/Time    CULTURE WOUND W/ GRAM STAIN [[id number]] Collected:  02/10/18 1235    Order Status:  Completed Specimen:  Other Updated:  02/10/18 1316    ANAEROBIC CULTURE [[id number]] Collected:  02/10/18 1235    Order Status:  Completed Specimen:  Other Updated:  02/10/18 1316    BLOOD CULTURE [[id number]]  (Abnormal)  (Susceptibility) Collected:  02/07/18 1848    Order Status:  Completed Specimen:  Blood from Peripheral Updated:  02/10/18 0842     Significant Indicator POS (POS)     Source BLD     Site PERIPHERAL     Blood Culture -- (A)     Growth detected by Bactec instrument.  02/08/2018   "08:43  Staphylococcus aureus (methicillin sensitive)  detected by PCR.       Blood Culture Staphylococcus aureus (A)    Narrative:       CALL  [place]  171 tel. [phone],  CALLED  171 tel. [phone] 02/08/2018, 08:49, RB PERF. RESULTS CALLED  TO:[id number] and DILAN  2 of 2 blood culture x2  Sites order. Per Hospital Policy:  Only change Specimen Src: to \"Line\" if specified by physician  order.    Culture & Susceptibility     STAPHYLOCOCCUS AUREUS     Antibiotic Sensitivity Microscan Unit Status    Ampicillin/sulbactam Sensitive <=8/4 mcg/mL Final    Clindamycin Sensitive <=0.5 mcg/mL Final    Daptomycin Sensitive <=0.5 mcg/mL Final    Erythromycin Sensitive <=0.5 mcg/mL Final    Moxifloxacin Sensitive <=0.5 mcg/mL Final    Oxacillin Sensitive <=0.25 mcg/mL Final    Tetracycline Sensitive <=4 mcg/mL Final    Trimeth/Sulfa Sensitive <=0.5/9.5 mcg/mL Final    Vancomycin Sensitive 1 mcg/mL Final                       BLOOD CULTURE [[id number]]  (Abnormal) Collected:  02/07/18 1848    Order Status:  Completed Specimen:  Blood from Peripheral Updated:  02/10/18 0842     Significant Indicator POS (POS)     Source BLD     Site PERIPHERAL     Blood Culture Growth detected by Bactec instrument.  02/08/2018  08:50 (A)     Blood Culture -- (A)     Staphylococcus aureus  See previous culture for sensitivity report.      Narrative:       CALL  [place]  171 tel. [phone],  CALLED  171 tel. [phone] 02/08/2018, 08:50, RB PERF. RESULTS CALLED  TO:[id number] and DILAN  1 of 2 for Blood Culture x 2 sites order. Per Hospital  Policy: Only change Specimen Src: to \"Line\" if specified by  physician order.    BLOOD CULTURE [[id number]] Collected:  02/09/18 1641    Order Status:  Completed Specimen:  Blood from Peripheral Updated:  02/10/18 0759     Significant Indicator NEG     Source BLD     Site PERIPHERAL     Blood Culture --     No Growth    Note: Blood cultures are incubated for 5 days and  are monitored continuously.Positive blood cultures  are " "called to the RN and reported as soon as  they are identified.      Narrative:       Per Hospital Policy: Only change Specimen Src: to \"Line\" if  specified by physician order.    BLOOD CULTURE [[id number]] Collected:  02/09/18 1641    Order Status:  Completed Specimen:  Blood from Peripheral Updated:  02/10/18 0759     Significant Indicator NEG     Source BLD     Site PERIPHERAL     Blood Culture --     No Growth    Note: Blood cultures are incubated for 5 days and  are monitored continuously.Positive blood cultures  are called to the RN and reported as soon as  they are identified.      Narrative:       Per Hospital Policy: Only change Specimen Src: to \"Line\" if  specified by physician order.    FLUID CULTURE W/GRAM STAIN [[id number]]     Order Status:  No result Specimen:  Body Fluid from Other Body Fluid     Urinalysis [[id number]] Collected:  02/08/18 0539    Order Status:  Completed Specimen:  Urine from Urine, Clean Catch Updated:  02/08/18 0557     Color Yellow     Character Clear     Specific Gravity 1.012     Ph 6.0     Glucose Negative mg/dL      Ketones Negative mg/dL      Protein Negative mg/dL      Bilirubin Negative     Urobilinogen, Urine 0.2     Nitrite Negative     Leukocyte Esterase Negative     Occult Blood Negative     Micro Urine Req see below     Comment: Microscopic examination not performed when specimen is clear  and chemically negative for protein, blood, leukocyte esterase  and nitrite.         Narrative:       If not done within the last 24 hours    Culture Respiratory W/ GRM STN [[id number]]     Order Status:  Completed Specimen:  Respirate from Sputum     Blood Culture [[id number]]     Order Status:  Canceled Specimen:  Blood from Peripheral     Blood Culture [[id number]]     Order Status:  Canceled Specimen:  Blood from Peripheral     Culture Respiratory W/ GRM STN [[id number]]     Order Status:  Canceled Specimen:  Respirate from Sputum     Blood Culture [[id number]] Collected:  02/07/18 0000 "    Order Status:  Canceled Specimen:  Other from Peripheral     Blood Culture [[id number]] Collected:  02/07/18 0000    Order Status:  Canceled Specimen:  Other from Peripheral           Assessment:  Active Hospital Problems    Diagnosis   • *Sepsis (CMS-HCC) [A41.9]   • Hypokalemia [E87.6]   • Lactic acidosis [E87.2]   • Neck pain [M54.2]   • Hyponatremia [E87.1]   • CAP (community acquired pneumonia) [J18.9]       Plan:  MSSA bacteremia  Blood cultures are positive on 2/7/2018  Repeat blood cultures are negative from 2/9  TTE is negative from 2/9/28  Check WES  ESR 7 and CRP 15.6      Right neck abscess  Underwent I&D on 2/10/2018  Follow the cultures    Pneumonia  Continue Ancef  Procalcitonin is 3.46  Discussed with internal medicine.

## 2018-02-11 NOTE — CARE PLAN
Problem: Safety  Goal: Will remain free from falls  Outcome: PROGRESSING AS EXPECTED  Mobility assessed. Fall risk precautions in place. Pt encouraged to use call light before getting out of bed. Pt verbalized understanding and able to return demonstrate how to use call light. Hourly rounding in place. Bed alarm set.     Problem: Venous Thromboembolism (VTW)/Deep Vein Thrombosis (DVT) Prevention:  Goal: Patient will participate in Venous Thrombosis (VTE)/Deep Vein Thrombosis (DVT)Prevention Measures  Outcome: PROGRESSING AS EXPECTED   02/10/18 2000   OTHER   Risk Assessment Score 2   VTE RISK Moderate   Pharmacologic Prophylaxis Used Unfractionated Heparin   Mechanical/VTE Prophylaxis   Mechanical Prophylaxis  SCDs, Sequential Compression Device   SCDs, Sequential Compression Device On     Heparin SC and SCD's ordered for prophylactic VTE. Pt educated on the medication, SCD's,  and the need for prophylaxis. All questions and concerns addressed. Pt verbalized understanding and is willing to participate in VTE prevention.

## 2018-02-11 NOTE — PROGRESS NOTES
"Urszula Persaud is a 57 y.o. female patient.  1. Sepsis, due to unspecified organism (CMS-[facility])    2. Neck pain    3. Pneumonia of right lower lobe due to infectious organism (CMS-[facility])      PMHx: HTN    No Known Allergies  Principal Problem:    Sepsis (CMS-[facility])  Active Problems:    A-fib (CMS-[facility])    Hypokalemia    Lactic acidosis    Neck pain    Hyponatremia    CAP (community acquired pneumonia)    Blood pressure 138/91, pulse 85, temperature 36.7 °C (98.1 °F), resp. rate (!) 22, height 1.549 m (5' 1\"), weight 70.6 kg (155 lb 10.3 oz), SpO2 98 %, not currently breastfeeding.    Subjective doing well c/o back and neck pain, muscle pain  Objective drain in place with minimal drainage, drain pulled residential out  Neck culture prelim with gram positive cocci  Assessment & Plan   Neck cellulitis, phlegmon S/P I&D  Likely also staph  Will remove drain tomorrow  Okay to go to floor    Millie Perez MD  2/11/2018  "

## 2018-02-11 NOTE — PROGRESS NOTES
[facility] Hospitalist Progress Note    Date of Service: 2018    Chief Complaint  57 y.o. female admitted 2018 with neck pain; found cellulitis and deep neck infection with MSSA bacteremia     Interval Problem Update  Converted into SR an hour after being placed on amio gtt, now on PO amio  S/P OR for I & D yesterday, has Penrose drain in neck  BPs good  Having a lot of pain, has been refusing pain meds until now  Had bm, voids well  Stop fluids  Day 4 ancef per ID    Consultants/Specialty  ENT Dr. Perez  Surgery Dr. Gong  Cards Dr. Grace  ID Dr. Trent    Disposition  Back to [facility] floor    FC  D/W tx team on rounds          Review of Systems   Constitutional: Negative for chills and fever.   Respiratory: Negative for cough and shortness of breath.    Cardiovascular: Negative for chest pain and palpitations.   Gastrointestinal: Negative for abdominal pain, nausea and vomiting.   Genitourinary: Negative for dysuria.   Musculoskeletal: Positive for back pain and neck pain. Negative for myalgias.        Significant pain at surgical site   Neurological: Negative for dizziness and headaches.      Physical Exam  Laboratory/Imaging   Hemodynamics  Temp (24hrs), Av.7 °C (98 °F), Min:36.3 °C (97.4 °F), Max:37.2 °C (99 °F)   Temperature: 36.7 °C (98 °F)  Pulse  Av.4  Min: 70  Max: 180 Heart Rate (Monitored): 84  Blood Pressure: 114/73, NIBP: 133/81      Respiratory      Respiration: 16, Pulse Oximetry: 99 %     Work Of Breathing / Effort: Mild  RUL Breath Sounds: Expiratory Wheezes, RML Breath Sounds: Clear, RLL Breath Sounds: Diminished, STEFFEN Breath Sounds: Expiratory Wheezes, LLL Breath Sounds: Diminished    Fluids    Intake/Output Summary (Last 24 hours) at 18 0804  Last data filed at 18 0600   Gross per 24 hour   Intake           4028.6 ml   Output              915 ml   Net           3113.6 ml       Nutrition  Orders Placed This Encounter   Procedures   • DIET ORDER     Standing Status:    Standing     Number of Occurrences:   1     Order Specific Question:   Diet:     Answer:   Regular [1]     Physical Exam   Constitutional: She is oriented to person, place, and time. She appears well-developed and well-nourished. She appears distressed (in pain).   HENT:   Head: Normocephalic and atraumatic.   Cardiovascular: Regular rhythm and normal heart sounds.    No murmur heard.  Tachy 90s but in SR   Pulmonary/Chest: Effort normal and breath sounds normal. No respiratory distress. She has no wheezes. She has no rales.   Abdominal: Soft. Bowel sounds are normal. She exhibits no distension. There is no tenderness.   Musculoskeletal: Normal range of motion. She exhibits no edema.   Neurological: She is alert and oriented to person, place, and time.   Skin: Skin is warm and dry. No erythema.   Bandage over penrose drain right shoulder/neck area   Nursing note and vitals reviewed.      Recent Labs      02/09/18   0201  02/10/18   0257   WBC  8.4  10.0   RBC  3.28*  3.41*   HEMOGLOBIN  11.0*  11.4*   HEMATOCRIT  30.8*  31.5*   MCV  93.9  92.4   MCH  33.5*  33.4*   MCHC  35.7*  36.2*   RDW  47.2  46.7   PLATELETCT  142*  162*   MPV  9.6  10.1     Recent Labs      02/09/18   0201  02/10/18   0257   SODIUM  133*  132*   POTASSIUM  3.8  3.3*   CHLORIDE  105  101   CO2  20  21   GLUCOSE  151*  137*   BUN  10  6*   CREATININE  0.60  0.49*   CALCIUM  7.7*  8.4*                      Assessment/Plan     * Sepsis (CMS-[facility])- (present on admission)   Assessment & Plan    This was sepsis (without associated acute organ dysfunction).   Hx of tooth infection in last 3 months  MRI of her cervical, thoracic, lumbar spine with and without contrast apparently did  NOT show any evidence of epidural abscesses  Resolved lactic acid   Likely 2/2 cellulitis/deep neck infection    Blood culture: MSSA on 2/7  Switch IV vancomycin and IV Zosyn to cefazolin on 2/8  Day 4 cefazolin   Echo on 2/9 no signs of endocarditis; ID would like WES    Repeat blood culture on 2/9: negative thus far            Atrial fibrillation with RVR (CMS-HCC)   Assessment & Plan    New Dx  developed Afib with RVR on 2/10 up to 170s  Metoprolol did not work  Converted after an hour on amiodarone drip, cards following, switched to oral amio  TSH normal        Abscess or cellulitis, neck- (present on admission)   Assessment & Plan    With phlegmon, s/p I&D by Dr. Perez yesterday, still with drain in, not draining much  Continue IV antibiotics          CAP (community acquired pneumonia)- (present on admission)   Assessment & Plan    -vs atelectasis  -wbc normal, doing fine  -s/p abx        Hyponatremia- (present on admission)   Assessment & Plan    -mild        Lactic acidosis- (present on admission)   Assessment & Plan    2/2 sepsis; resolved        Hypokalemia- (present on admission)   Assessment & Plan    -replacing  -check am labs            Reviewed items::  Labs reviewed and Medications reviewed  Soliz catheter::  No Soliz  DVT prophylaxis pharmacological::  Heparin  Antibiotics:  Treating active infection/contamination beyond 24 hours perioperative coverage

## 2018-02-11 NOTE — PROGRESS NOTES
Infectious Disease Progress Note    Author: Brandi Trent M.D. Date & Time of service: 2018  3:58 PM    Chief Complaint:  Staph aureus bacteremia    Interval History:  2018-MAXIMUM TEMPERATURE 103.8 wbc 8.4 platelets 142 creatinine 0.6 says she feels slightly better  2/10/2018-transferred to ICU for A. nurys with RVR MAXIMUM TEMPERATURE 99.7 underwent drainage of the right neck abscess on 2/10/2018 WBC 10.   2018-MAXIMUM TEMPERATURE 98.8 ongoing neck pain and now complains of pain in her right upper arm  Labs Reviewed, Medications Reviewed and Radiology Reviewed.    Review of Systems:  Review of Systems   Constitutional: Positive for malaise/fatigue. Negative for chills and fever.   HENT: Negative for hearing loss.         [place] of neck pain   Respiratory: Negative for cough and shortness of breath.    Cardiovascular: Negative for chest pain and leg swelling.   Gastrointestinal: Negative for abdominal pain, nausea and vomiting.   Genitourinary: Negative for dysuria.   Musculoskeletal: Positive for myalgias.        Neck pain plus right arm pain   Neurological: Negative for sensory change and speech change.       Hemodynamics:  Temp (24hrs), Av.7 °C (98 °F), Min:36.3 °C (97.4 °F), Max:37.1 °C (98.8 °F)  Temperature: 36.7 °C (98.1 °F)  Pulse  Av.2  Min: 70  Max: 180Heart Rate (Monitored): 87  Blood Pressure: 138/91, NIBP: 126/92       Physical Exam:  Physical Exam   Constitutional: She is oriented to person, place, and time. No distress.   HENT:   Mouth/Throat: No oropharyngeal exudate.   Eyes: No scleral icterus.   Neck:   Right neck area bandaged   Cardiovascular:   No murmur heard.  IRR   Pulmonary/Chest: She has no rales.   Abdominal: There is no tenderness. There is no rebound.   Musculoskeletal: She exhibits no edema.   Right shoulder today and less tender and moving  Swelling of the right deltoid area   Neurological: She is alert and oriented to person, place, and time.   Vitals  reviewed.      Meds:    Current Facility-Administered Medications:   •  potassium chloride SA  •  metoprolol  •  Metoprolol Tartrate  •  amiodarone  •  heparin  •  ceFAZolin  •  ketorolac  •  cyclobenzaprine  •  senna-docusate **AND** polyethylene glycol/lytes **AND** magnesium hydroxide **AND** bisacodyl  •  Respiratory Care per Protocol  •  acetaminophen  •  Notify provider if pain remains uncontrolled **AND** Use the numeric rating scale (NRS-11) on regular floors and Critical-Care Pain Observation Tool (CPOT) on ICUs/Trauma to assess pain **AND** Pulse Ox (Oximetry) **AND** Pharmacy Consult Request **AND** If patient difficult to arouse and/or has respiratory depression, stop any opiates that are currently infusing and call a Rapid Response. **AND** oxyCODONE immediate-release **AND** oxyCODONE immediate-release **AND** HYDROmorphone    Labs:  Recent Labs      02/09/18   0201  02/10/18   0257   WBC  8.4  10.0   RBC  3.28*  3.41*   HEMOGLOBIN  11.0*  11.4*   HEMATOCRIT  30.8*  31.5*   MCV  93.9  92.4   MCH  33.5*  33.4*   RDW  47.2  46.7   PLATELETCT  142*  162*   MPV  9.6  10.1   NEUTSPOLYS  59.50  60.30   LYMPHOCYTES  6.90*  13.80*   MONOCYTES  3.40  3.40   EOSINOPHILS  0.00  0.90   BASOPHILS  1.70  0.00   RBCMORPHOLO  Present  Present     Recent Labs      02/09/18   0201  02/10/18   0257   SODIUM  133*  132*   POTASSIUM  3.8  3.3*   CHLORIDE  105  101   CO2  20  21   GLUCOSE  151*  137*   BUN  10  6*     Recent Labs      02/09/18   0201  02/10/18   0257   ALBUMIN  2.8*  3.1*   TBILIRUBIN  3.6*  3.6*   ALKPHOSPHAT  53  75   TOTPROTEIN  5.2*  5.8*   ALTSGPT  30  26   ASTSGOT  19  22   CREATININE  0.60  0.49*       Imaging:  Ct-soft Tissue Neck With    Result Date: 2/7/2018 2/7/2018 5:01 PM HISTORY/REASON FOR EXAM: Constant right shoulder pain. TECHNIQUE/EXAM DESCRIPTION AND NUMBER OF VIEWS:  CT soft tissue neck with contrast. The study was performed on a helical multidetector CT scanner. Contiguous thin section  helical images were obtained of the neck from the skull base through the thoracic inlet. 80 mL of Omnipaque 350 nonionic contrast was injected intravenously. Low dose optimization technique was utilized for this CT exam including automated exposure control and adjustment of the mA and/or kV according to patient size. COMPARISON: None. FINDINGS: There are no mass lesions or fluid collections in the deep or superficial soft tissues of the neck. There is no abnormal enhancement. Cervical lymph nodes show normal size and configuration. There is no pathologic adenopathy evident. Vascular enhancement  of the cervical carotid and vertebral arteries and internal jugular veins appears intact. The nasopharynx, oropharynx, and hypopharynx show normal airways and no abnormal soft tissue swelling. The larynx and trachea are unremarkable. The prevertebral soft tissues appear intact. The parapharyngeal spaces show normal symmetry and fat planes. The pterygopalatine and infratemporal fossae appear intact. The tongue and floor of the mouth are unremarkable. The submandibular, sublingual, and submental spaces appear intact. The parotid and submandibular glands show normal size and density. No abnormal calcifications are evident. The thyroid gland it is enlarged and appears homogeneous. The remaining structures at the thoracic inlet and superior mediastinum within the field of view are unremarkable. There is minimal posterior disc space narrowing and posterior osteophytic spurring at C5-6 and C6-7. No intracranial abnormalities are evident.     1.  No acute inflammatory process identified in the soft tissues of the neck. 2.  Thyroid gland enlargement. 3.  Minor degenerative changes posteriorly at C5-6 and C6-7.    Dx-chest-portable (1 View)    Result Date: 2/9/2018 2/9/2018 6:05 AM HISTORY/REASON FOR EXAM: Chest Pain TECHNIQUE/EXAM DESCRIPTION:  Single AP view of the chest. COMPARISON: None FINDINGS: Overlying cardiac leads are  present. The cardiac silhouette appears within normal limits. Atherosclerotic calcification of the aorta is noted.  The central  pulmonary vasculature appears prominent and indistinct. The lungs appear well expanded bilaterally.  Diffuse scattered hazy pulmonary parenchymal opacities are seen. Veil-like opacities are seen in the right lung base. The bony structures appear age-appropriate.     1.  Pulmonary edema and/or infiltrates. 2.  Small layering right pleural effusion 3.  Atherosclerosis    Dx-shoulder 2+ Right    Result Date: 2/6/2018 2/6/2018 9:05 AM HISTORY/REASON FOR EXAM:  Atraumatic Pain/Swelling/Deformity Acute pain of right shoulder after GLF 2 days ago TECHNIQUE/EXAM DESCRIPTION AND NUMBER OF VIEWS:  3 views of the RIGHT shoulder. COMPARISON: None FINDINGS: No acute fracture or dislocation. Mild osteoarthritis of the AC joint and glenohumeral joint     1. No acute osseous abnormality.    Mr-cervical Spine-with & W/o    Result Date: 2/8/2018 2/7/2018 7:03 PM HISTORY/REASON FOR EXAM:  Atraumatic pain, paresis. Right shoulder pain, headache. TECHNIQUE/EXAM DESCRIPTION: MRI of the cervical spine without and with contrast. The study was performed on a [employer]ia 1.5 Aubrie MRI scanner. T1 sagittal, T2 fast spin-echo sagittal, T1 postcontrast fat-suppressed sagittal, and gradient-echo axial images were obtained of the cervical spine. 15 mL Omniscan contrast were administered  intravenously. COMPARISON:  None. FINDINGS:  There is minimal grade 1 retrolisthesis of and C5 on 6. There is mild and vertebral disc space narrowing at C5-6. The disk spaces are well-maintained and have a normal appearance. The cervical cord has a normal caliber, course and signal intensity. There is no definite evidence of abnormal enhancement in the cervical cord or spine. Level specific findings as follows: C2-C3: No significant central canal or neural foraminal stenosis. C3-C4: No significant central canal or neural foraminal  stenosis. C4-C5: There is mild posterior midline disc bulge which effaces anterior thecal sac. There is borderline central canal stenosis. C5-C6: There is mild broad posterior disc bulge and endplate spurring. There is mild ligamentum flavum hypertrophy. There is mild central canal stenosis. There is mild to moderate right and moderate left neural foraminal stenosis secondary to endplate spurring and facet arthropathy. C6-C7: There is mild broad posterior disc bulge. There is mild ligamentum flavum hypertrophy. There is mild central canal stenosis. There is moderate left neural foraminal stenosis secondary to disc bulge and facet arthropathy. C7-T1: No significant central canal or neural foraminal stenosis. The visualized prevertebral and posterior paraspinous soft tissues are grossly unremarkable.     1.  No acute abnormality or abnormal enhancement in the cervical spine. If there is strong clinical suspicion for discitis/osteomyelitis or epidural infectious process, short interval repeat imaging is recommended. 2.  Multilevel degenerative changes of the cervical spine as described above.    Mr-lumbar Spine-with & W/o    Result Date: 2/8/2018 2/7/2018 7:03 PM HISTORY/REASON FOR EXAM:  Atraumatic back pain. TECHNIQUE/EXAM DESCRIPTION: MRI of the lumbar spine without and with contrast. The study was performed on a NONO Signa 1.5 Aubrie MRI scanner. T1 sagittal, T2 fast spin-echo sagittal, and T2 axial images were obtained of the lumbar spine. T1 postcontrast fat-suppressed sagittal images were obtained. 15 mL Omniscan contrast was administered intravenously. COMPARISON:  None. FINDINGS: Marrow signal intensity is normal. There is no abnormal enhancement. Vertebral body heights are preserved. Vertebral alignment is normal. There is mild intervertebral disc space narrowing at L5-S1 with decrease of the normal T2 signal intensity within the disc space consistent with mild degenerative disc desiccation. The spinal cord  demonstrates normal course and caliber. There is no abnormal cord enhancement. Conus terminates at the T12/L1 level. Level-specific findings as follows: L5-S1: There is mild broad posterior disc bulge. There is mild facet arthropathy. There is mild bilateral neural foraminal stenosis. L4-5: There is mild diffuse disc bulge. There is minimal bilateral neural foraminal stenosis. L3-4: No significant central canal or neural foraminal stenosis. L2-3: No significant central canal or neural foraminal stenosis. L1-2: No significant central canal or neural foraminal stenosis. The visualized prevertebral and posterior paraspinous soft tissues are grossly unremarkable.     1.  No acute abnormality or abnormal enhancement in the lumbar spine. If there is strong clinical suspicion for discitis/osteomyelitis or epidural infectious process, short interval repeat imaging is recommended. 2.  Multilevel degenerative changes of the lumbar spine as described above.    Mr-shoulder-w/o Right    Addendum Date: 2/8/2018    I further discussed this case with Dr. Badillo at 4:20 PM on 2/8/2018. The patient is exquisitely tender over the medial clavicle. That area is not included on this study. There is however likely some edema and induration of the soft tissues surrounding the mid and medial clavicle at the edge of the images. Review of CT scan of the neck and chest from the same date demonstrates induration of the soft tissues surrounding the right medial clavicle as well as centered in the area of the sternoclavicular joint. These findings would be suspicious for septic arthritis/osteomyelitis of the right sternoclavicular joint.    Result Date: 2/8/2018 2/7/2018 7:03 PM HISTORY/REASON FOR EXAM: Right-sided shoulder pain TECHNIQUE/EXAM DESCRIPTION: MRI of the RIGHT shoulder without contrast. Using a [employer] Signa 1.5 Aubrie MRI scanner, T1 sagittal, fast spin-echo T2 fat-suppressed oblique coronal, sagittal, and axial and intermediate fast  spin-echo oblique coronal images were obtained. COMPARISON: None. FINDINGS: Osseous acromial outlet: The acromion demonstrates a curved undersurface. There is no lateral downsloping. There is no evidence of an inferiorly directed subacromial enthesophyte. There is mild degenerative change of the acromioclavicular joint. There are no inferiorly directed osteophytes. There is minimal fluid seen within the subacromial/subdeltoid bursa. Rotator cuff: There is tendinopathy involving the supraspinatus tendon with mild fraying of the bursal surface fibers. No evidence of full-thickness tear or retraction. Glenoid labrum: There is no definite superior labral tear or evidence of disruption of the biceps anchor. There is a multiloculated fluid signal focus seen anterior to the subscapularis tendon which measures 2.4 x 1.4 x 1.1 cm in size. This extends towards the anterior/superior labrum. The anterior/inferior and posterior/inferior glenoid labrum are intact. Osseous structures/Cartilaginous surfaces: No evidence of marrow edema.  Cartilaginous surfaces are well maintained. Miscellaneous: No significant joint effusion. The long head of the biceps tendon is appropriately situated within the bicipital groove.     1.  Mild tendinopathy of the supraspinatus tendon with very mild fraying of the bursal surface fibers. No evidence of full-thickness tear or retraction. 2.  No definite tear of the glenoid labrum. There is however a multiloculated fluid signal focus present anterior to the subscapularis tendon which extends towards the anterior/superior labrum. This may represent a ganglion cyst but can also potential he  represent a paralabral cyst related to nonvisualized superior labral tear. 3.  Mild degenerative change of the acromioclavicular joint.    Mr-thoracic Spine-with & W/o    Result Date: 2/8/2018 2/7/2018 7:03 PM HISTORY/REASON FOR EXAM: Atraumatic back and neck pain. TECHNIQUE/EXAM DESCRIPTION: MRI of the thoracic  spine without and with contrast. The study was performed on a 2-Observe Signa 1.5 Aubrie MRI scanner. T1 sagittal, T1 postcontrast fat-suppressed sagittal, T2 sagittal, and T2 axial images were obtained of the thoracic spine. 15 mL Omniscan contrast were administered intravenously. COMPARISON:  None FINDINGS: The thoracic vertebral bodies have a normal height and alignment. The thoracic disks have a normal appearance and signal intensity. The thoracic cord has a normal caliber, course and signal intensity. There is no evidence of abnormal enhancement in the thoracic cord or spine. There is no evidence of disk extrusion, cord compression, central canal stenosis, or neural foraminal stenosis. There is atelectasis and/or consolidation in the visualized portion of the right lung base.     1.  No acute abnormality or abnormal enhancement in the thoracic spine. If there is strong clinical suspicion for discitis/osteomyelitis or epidural infectious process, short interval repeat imaging is recommended. 2.  Possible right lung base pneumonitis.    Echocardiogram Comp W/o Cont    Result Date: 2018  Transthoracic Echo Report Echocardiography Laboratory CONCLUSIONS No prior study is available for comparison. Normal left ventricular chamber size. Left ventricular ejection fraction is visually estimated to be 60%. No significant valve abnormalities. Estimated right ventricular systolic pressure is 45 mmHg. JESSICA ANDRADE Exam Date:         2018                    13:18 Exam Location:     Inpatient Priority:          Routine Ordering Physician:        SENIA CHANG Referring Physician:       [id number] Feliz Sonographer:               Kristin Dykes RDCS Age:    57     Gender:    F MRN:    [id number] :    1960 BSA:    1.68   Ht (in):    61     Wt (lb):    151 Exam Type:     Complete Indications:     Fever ICD Codes:       780.6 CPT Codes:       59000 BP:   104    /   75     HR:   70 Technical Quality:        Fair MEASUREMENTS  (Male / Female) Normal Values 2D ECHO LV Diastolic Diameter PLAX        3.9 cm                4.2 - 5.9 / 3.9 - 5.3 cm LV Systolic Diameter PLAX         2.4 cm                2.1 - 4.0 cm IVS Diastolic Thickness           0.98 cm               LVPW Diastolic Thickness          1 cm                  RV Diameter 4C                    2.3 cm                2.5 - 2.9 cm LVOT Diameter                     1.8 cm                RA Diameter                       2.8 cm                Estimated LV Ejection Fraction    60 %                  LV Ejection Fraction MOD BP       60.6 %                >= 55  % LV Ejection Fraction MOD 4C       63.1 %                LV Ejection Fraction MOD 2C       56.9 %                LA Volume Index                   21 cm³/m²             16 - 28 cm³/m² IVC Diameter                      1.2 cm                M-MODE Aortic Root Diameter MM           3 cm                  DOPPLER AV Peak Velocity                  1.4 m/s               AV Peak Gradient                  8.2 mmHg              AV Mean Gradient                  4 mmHg                LVOT Peak Velocity                1.2 m/s               AV Area Cont Eq vti               2.1 cm²               MV Velocity Time Integral         25 cm                 Mitral E Point Velocity           1.2 m/s               Mitral E to A Ratio               1.1                   Mitral A Duration                 104 ms                MV Pressure Half Time             40.7 ms               MV Area PHT                       5.4 cm²               MV Deceleration Time              143 ms                Pulmonary Vein Systolic Velocity  0.45 m/s              Pulmonary Vein Diastolic Velocit  0.41 m/s              Pulmonary Vein S/D Ratio          1.1                   Pulmonary Vein A Velocity         0.5 m/s               Pulmonary Vein A Duration         157 ms                Pulm Vein-MV A Duration           53.1 ms               TV Peak E  Velocity                0.52 m/s              TR Peak Velocity                  299 cm/s              PV Peak Velocity                  0.95 m/s              PV Peak Gradient                  3.6 mmHg              RVOT Peak Velocity                0.74 m/s              * Indicates values subject to auto-interpretation LV EF:  60    % FINDINGS Left Ventricle Normal left ventricular chamber size. Normal left ventricular wall thickness. Normal left ventricular systolic function. Left ventricular ejection fraction is visually estimated to be 60%. Normal regional wall motion. Normal diastolic function. Right Ventricle The right ventricle was normal in size and function. Right Atrium The right atrium is normal in size. Normal inferior vena cava size and inspiratory collapse. Left Atrium The left atrium is normal in size. Left atrial volume index is 21 mL/sq m. Mitral Valve Structurally normal mitral valve without significant stenosis or regurgitation. Aortic Valve Structurally normal aortic valve without significant stenosis or regurgitation. Tricuspid Valve Structurally normal tricuspid valve. No tricuspid stenosis. Trace tricuspid regurgitation. Estimated right ventricular systolic pressure is 45 mmHg. Pulmonic Valve Structurally normal pulmonic valve without significant stenosis or regurgitation. Pericardium Normal pericardium without effusion. Aorta The aortic root is normal. Ascending aorta diameter is 2.9 cm. Vinnie Naqvi MD (Electronically Signed) Final Date:     09 February 2018                 14:50    Ct-cta Chest Pulmonary Artery W/ Recons    Result Date: 2/7/2018 2/7/2018 5:01 PM HISTORY/REASON FOR EXAM:  Chest pain. TECHNIQUE/EXAM DESCRIPTION: CT angiogram scan for pulmonary embolism with contrast, with reconstructions. 1.25 mm helical sections were obtained from the lung apices through the lung bases following the rapid bolus administration of 80 mL of Omnipaque 350 nonionic contrast. Thin-section  overlapping reconstruction interval was utilized. Coronal reconstructions were generated from the axial data. MIP post processing was performed and utilized for the interpretation. Low dose optimization technique was utilized for this CT exam including automated exposure control and adjustment of the mA and/or kV according to patient size. COMPARISON: None FINDINGS: Pulmonary Embolism: No. Main Pulmonary Arteries: No. Segmental branches: No. Subsegmental branches: No. Additional Comments: The heart is normal in size and configuration. Lungs: There is minimal atelectasis or pneumonia in the posterior aspect of the right lower lobe and minimally in the anterior inferior aspect of the lingula. The remainder of the lung parenchyma is clear. Pleura: No pleural effusion. Nodes: No enlarged lymph nodes. Additional findings: There is mild enlargement of both adrenal glands. No hydronephrosis is visualized     1.  No evidence of pulmonary embolism. 2.  Minimal atelectasis or pneumonia in the right lower lobe and in the lingula. 3.  Mild bilateral adrenal gland enlargement. Significance unknown.       Micro:  Results     Procedure Component Value Units Date/Time    CULTURE WOUND W/ GRAM STAIN [[id number]]  (Abnormal) Collected:  02/10/18 1235    Order Status:  Completed Specimen:  Wound Updated:  02/11/18 1451     Significant Indicator POS (POS)     Source WND     Site Neck Abscess     Culture Result Wound -- (A)     Gram Stain Result --     Few WBCs.  Rare Gram positive cocci.       Culture Result Wound -- (A)     Staphylococcus aureus  Light growth      ANAEROBIC CULTURE [[id number]] Collected:  02/10/18 1235    Order Status:  Completed Specimen:  Wound Updated:  02/11/18 1451     Significant Indicator NEG     Source WND     Site Neck Abscess     Anaerobic Culture, Culture Res Culture in progress.    BLOOD CULTURE [[id number]]  (Abnormal) Collected:  02/09/18 1641    Order Status:  Completed Specimen:  Blood from Peripheral  "Updated:  02/11/18 1334     Significant Indicator POS (POS)     Source BLD     Site PERIPHERAL     Blood Culture -- (A)     Growth detected by Bactec instrument.  02/11/2018  13:33  Gram Stain: Gram positive cocci: Possible Staphylococcus sp.  Staphylococcus aureus (methicillin sensitive)  detected by PCR.  Susceptibility to follow.      Narrative:       CALL  [place]  161 tel. [phone],  CALLED  161 tel. [phone] 02/11/2018, 13:33, RB PERF. RESULTS CALLED  TO:[id number],RN;[facility]  Per Hospital Policy: Only change Specimen Src: to \"Line\" if  specified by physician order.    GRAM STAIN [[id number]] Collected:  02/10/18 1235    Order Status:  Completed Specimen:  Wound Updated:  02/11/18 0851     Significant Indicator .     Source WND     Site Neck Abscess     Gram Stain Result --     Few WBCs.  Rare Gram positive cocci.      BLOOD CULTURE [[id number]]  (Abnormal)  (Susceptibility) Collected:  02/07/18 1848    Order Status:  Completed Specimen:  Blood from Peripheral Updated:  02/10/18 0842     Significant Indicator POS (POS)     Source BLD     Site PERIPHERAL     Blood Culture -- (A)     Growth detected by Bactec instrument.  02/08/2018  08:43  Staphylococcus aureus (methicillin sensitive)  detected by PCR.       Blood Culture Staphylococcus aureus (A)    Narrative:       CALL  [place]  171 tel. [phone],  CALLED  171 tel. [phone] 02/08/2018, 08:49, RB PERF. RESULTS CALLED  TO:[id number] and DILAN  2 of 2 blood culture x2  Sites order. Per Hospital Policy:  Only change Specimen Src: to \"Line\" if specified by physician  order.    Culture & Susceptibility     STAPHYLOCOCCUS AUREUS     Antibiotic Sensitivity Microscan Unit Status    Ampicillin/sulbactam Sensitive <=8/4 mcg/mL Final    Clindamycin Sensitive <=0.5 mcg/mL Final    Daptomycin Sensitive <=0.5 mcg/mL Final    Erythromycin Sensitive <=0.5 mcg/mL Final    Moxifloxacin Sensitive <=0.5 mcg/mL Final    Oxacillin Sensitive <=0.25 mcg/mL Final    Tetracycline " "Sensitive <=4 mcg/mL Final    Trimeth/Sulfa Sensitive <=0.5/9.5 mcg/mL Final    Vancomycin Sensitive 1 mcg/mL Final                       BLOOD CULTURE [[id number]]  (Abnormal) Collected:  02/07/18 1848    Order Status:  Completed Specimen:  Blood from Peripheral Updated:  02/10/18 0842     Significant Indicator POS (POS)     Source BLD     Site PERIPHERAL     Blood Culture Growth detected by Bactec instrument.  02/08/2018  08:50 (A)     Blood Culture -- (A)     Staphylococcus aureus  See previous culture for sensitivity report.      Narrative:       CALL  [place]  171 tel. [phone],  CALLED  171 tel. [phone] 02/08/2018, 08:50, RB PERF. RESULTS CALLED  TO:[id number] and DILAN  1 of 2 for Blood Culture x 2 sites order. Per Hospital  Policy: Only change Specimen Src: to \"Line\" if specified by  physician order.    BLOOD CULTURE [[id number]] Collected:  02/09/18 1641    Order Status:  Completed Specimen:  Blood from Peripheral Updated:  02/10/18 0759     Significant Indicator NEG     Source BLD     Site PERIPHERAL     Blood Culture --     No Growth    Note: Blood cultures are incubated for 5 days and  are monitored continuously.Positive blood cultures  are called to the RN and reported as soon as  they are identified.      Narrative:       Per Hospital Policy: Only change Specimen Src: to \"Line\" if  specified by physician order.    FLUID CULTURE W/GRAM STAIN [[id number]]     Order Status:  No result Specimen:  Body Fluid from Other Body Fluid     Urinalysis [[id number]] Collected:  02/08/18 0539    Order Status:  Completed Specimen:  Urine from Urine, Clean Catch Updated:  02/08/18 0557     Color Yellow     Character Clear     Specific Gravity 1.012     Ph 6.0     Glucose Negative mg/dL      Ketones Negative mg/dL      Protein Negative mg/dL      Bilirubin Negative     Urobilinogen, Urine 0.2     Nitrite Negative     Leukocyte Esterase Negative     Occult Blood Negative     Micro Urine Req see below     Comment: Microscopic " examination not performed when specimen is clear  and chemically negative for protein, blood, leukocyte esterase  and nitrite.         Narrative:       If not done within the last 24 hours    Blood Culture [[id number]]     Order Status:  Canceled Specimen:  Blood from Peripheral     Blood Culture [[id number]]     Order Status:  Canceled Specimen:  Blood from Peripheral     Culture Respiratory W/ [facility] [[id number]]     Order Status:  Canceled Specimen:  Respirate from Sputum     Blood Culture [[id number]] Collected:  02/07/18 0000    Order Status:  Canceled Specimen:  Other from Peripheral     Blood Culture [[id number]] Collected:  02/07/18 0000    Order Status:  Canceled Specimen:  Other from Peripheral     Culture Respiratory W/ GRM [facility] [[id number]] Collected:  02/07/18 0000    Order Status:  Canceled Specimen:  Sputum from Sputum           Assessment:  Active Hospital Problems    Diagnosis   • *Sepsis (CMS-HCC) [A41.9]   • Hypokalemia [E87.6]   • Lactic acidosis [E87.2]   • Neck pain [M54.2]   • Hyponatremia [E87.1]   • CAP (community acquired pneumonia) [J18.9]       Plan:  MSSA bacteremia  Blood cultures are positive on 2/7/2018  Repeat blood cultures are positive from 2/9/2018  TTE is negative from 2/9/28  Check WES  ESR 7 and CRP 15.6    Developing right arm abscess  Monitor clinically  Let it declare itself and she will likely need I&D      Right neck abscess  Underwent I&D on 2/10/2018  Cultures a staph aureus    Pneumonia  Continue Ancef  Procalcitonin is 3.46  Discussed with internal medicine.

## 2018-02-11 NOTE — CARE PLAN
Problem: Bowel/Gastric:  Goal: Normal bowel function is maintained or improved  Outcome: PROGRESSING AS EXPECTED  Bowel pattern unchanged from home.

## 2018-02-11 NOTE — PROGRESS NOTES
"Progress Note:  2/11/2018, 8:10 AM    S: S/p I&D with drain placement of R neck phlegmon by ENT.  Pt reports pain better than prior to surgery.  Back in NSR. afebrile    O:  Blood pressure 138/91, pulse 83, temperature 36.7 °C (98 °F), resp. rate 16, height 1.549 m (5' 1\"), weight 70.6 kg (155 lb 10.3 oz), SpO2 99 %, not currently breastfeeding.    NAD, awake, calm and nondistressed  R neck /shoulder erythema much better, nearly resolved, less tender.  Penrose drain in place at base of neck  Midline sternum and R SC joint area nontender to palpation       A:   Active Hospital Problems    Diagnosis   • A-fib (CMS-[facility]) [I48.91]     Priority: High   • Sepsis (CMS-[facility]) [A41.9]     Priority: High   • Hypokalemia [E87.6]   • Lactic acidosis [E87.2]   • Neck pain [M54.2]   • Hyponatremia [E87.1]   • CAP (community acquired pneumonia) [J18.9]     Improved from yesterday    P:   -continue abx  -f/u OR cultures  -Will need repeat blood cultures eventually  -depending on clinical course, may need follow up imaging of neck/chest, looking at R AC and SC joints  -No plan for SC joint resection at this time  -Will continue to follow.  Please contact me with questions/concerns/updates    Gavin Gong M.D.  [facility]  [phone]    "

## 2018-02-12 LAB
ANION GAP SERPL CALC-SCNC: 7 MMOL/L (ref 0–11.9)
BACTERIA WND AEROBE CULT: ABNORMAL
BACTERIA WND AEROBE CULT: ABNORMAL
BUN SERPL-MCNC: 7 MG/DL (ref 8–22)
CALCIUM SERPL-MCNC: 8.6 MG/DL (ref 8.5–10.5)
CHLORIDE SERPL-SCNC: 96 MMOL/L (ref 96–112)
CO2 SERPL-SCNC: 27 MMOL/L (ref 20–33)
CREAT SERPL-MCNC: 0.41 MG/DL (ref 0.5–1.4)
ERYTHROCYTE [DISTWIDTH] IN BLOOD BY AUTOMATED COUNT: 46.4 FL (ref 35.9–50)
GLUCOSE SERPL-MCNC: 115 MG/DL (ref 65–99)
GRAM STN SPEC: ABNORMAL
HCT VFR BLD AUTO: 30.3 % (ref 37–47)
HGB BLD-MCNC: 11.3 G/DL (ref 12–16)
MCH RBC QN AUTO: 34.1 PG (ref 27–33)
MCHC RBC AUTO-ENTMCNC: 37.3 G/DL (ref 33.6–35)
MCV RBC AUTO: 91.5 FL (ref 81.4–97.8)
PLATELET # BLD AUTO: 242 K/UL (ref 164–446)
PMV BLD AUTO: 9.7 FL (ref 9–12.9)
POTASSIUM SERPL-SCNC: 4 MMOL/L (ref 3.6–5.5)
RBC # BLD AUTO: 3.31 M/UL (ref 4.2–5.4)
SIGNIFICANT IND 70042: ABNORMAL
SITE SITE: ABNORMAL
SODIUM SERPL-SCNC: 130 MMOL/L (ref 135–145)
SOURCE SOURCE: ABNORMAL
WBC # BLD AUTO: 11.9 K/UL (ref 4.8–10.8)

## 2018-02-12 PROCEDURE — 700102 HCHG RX REV CODE 250 W/ 637 OVERRIDE(OP): Performed by: HOSPITALIST

## 2018-02-12 PROCEDURE — 99233 SBSQ HOSP IP/OBS HIGH 50: CPT | Performed by: HOSPITALIST

## 2018-02-12 PROCEDURE — A9270 NON-COVERED ITEM OR SERVICE: HCPCS | Performed by: HOSPITALIST

## 2018-02-12 PROCEDURE — 700111 HCHG RX REV CODE 636 W/ 250 OVERRIDE (IP): Performed by: INTERNAL MEDICINE

## 2018-02-12 PROCEDURE — 80048 BASIC METABOLIC PNL TOTAL CA: CPT

## 2018-02-12 PROCEDURE — A9270 NON-COVERED ITEM OR SERVICE: HCPCS | Performed by: INTERNAL MEDICINE

## 2018-02-12 PROCEDURE — 700102 HCHG RX REV CODE 250 W/ 637 OVERRIDE(OP): Performed by: INTERNAL MEDICINE

## 2018-02-12 PROCEDURE — 770020 HCHG ROOM/CARE - TELE (206)

## 2018-02-12 PROCEDURE — 700111 HCHG RX REV CODE 636 W/ 250 OVERRIDE (IP): Performed by: HOSPITALIST

## 2018-02-12 PROCEDURE — 85027 COMPLETE CBC AUTOMATED: CPT

## 2018-02-12 PROCEDURE — 36415 COLL VENOUS BLD VENIPUNCTURE: CPT

## 2018-02-12 PROCEDURE — 87040 BLOOD CULTURE FOR BACTERIA: CPT | Mod: 91

## 2018-02-12 RX ORDER — ONDANSETRON 2 MG/ML
4 INJECTION INTRAMUSCULAR; INTRAVENOUS EVERY 4 HOURS PRN
Status: DISCONTINUED | OUTPATIENT
Start: 2018-02-12 | End: 2018-02-16 | Stop reason: HOSPADM

## 2018-02-12 RX ADMIN — OXYCODONE HYDROCHLORIDE 10 MG: 10 TABLET ORAL at 19:06

## 2018-02-12 RX ADMIN — OXYCODONE HYDROCHLORIDE 10 MG: 10 TABLET ORAL at 15:02

## 2018-02-12 RX ADMIN — METOPROLOL TARTRATE 25 MG: 25 TABLET, FILM COATED ORAL at 08:16

## 2018-02-12 RX ADMIN — STANDARDIZED SENNA CONCENTRATE AND DOCUSATE SODIUM 2 TABLET: 8.6; 5 TABLET, FILM COATED ORAL at 21:19

## 2018-02-12 RX ADMIN — AMIODARONE HYDROCHLORIDE 200 MG: 200 TABLET ORAL at 21:19

## 2018-02-12 RX ADMIN — METOPROLOL TARTRATE 25 MG: 25 TABLET, FILM COATED ORAL at 21:19

## 2018-02-12 RX ADMIN — CEFAZOLIN SODIUM 2 G: 2 INJECTION, SOLUTION INTRAVENOUS at 15:02

## 2018-02-12 RX ADMIN — AMIODARONE HYDROCHLORIDE 200 MG: 200 TABLET ORAL at 08:16

## 2018-02-12 RX ADMIN — OXYCODONE HYDROCHLORIDE 10 MG: 10 TABLET ORAL at 08:16

## 2018-02-12 RX ADMIN — CEFAZOLIN SODIUM 2 G: 2 INJECTION, SOLUTION INTRAVENOUS at 07:23

## 2018-02-12 RX ADMIN — ONDANSETRON 4 MG: 2 INJECTION INTRAMUSCULAR; INTRAVENOUS at 22:02

## 2018-02-12 RX ADMIN — CEFAZOLIN SODIUM 2 G: 2 INJECTION, SOLUTION INTRAVENOUS at 21:19

## 2018-02-12 RX ADMIN — HEPARIN SODIUM 5000 UNITS: 5000 INJECTION, SOLUTION INTRAVENOUS; SUBCUTANEOUS at 15:02

## 2018-02-12 RX ADMIN — STANDARDIZED SENNA CONCENTRATE AND DOCUSATE SODIUM 2 TABLET: 8.6; 5 TABLET, FILM COATED ORAL at 08:16

## 2018-02-12 ASSESSMENT — PAIN SCALES - GENERAL
PAINLEVEL_OUTOF10: 0
PAINLEVEL_OUTOF10: 5
PAINLEVEL_OUTOF10: 6
PAINLEVEL_OUTOF10: 6
PAINLEVEL_OUTOF10: 0
PAINLEVEL_OUTOF10: 6
PAINLEVEL_OUTOF10: 6

## 2018-02-12 ASSESSMENT — ENCOUNTER SYMPTOMS
COUGH: 0
MYALGIAS: 1
ABDOMINAL PAIN: 0
NECK PAIN: 1
SPEECH CHANGE: 0
CHILLS: 0
NAUSEA: 0
EYES NEGATIVE: 1
SHORTNESS OF BREATH: 0
GASTROINTESTINAL NEGATIVE: 1
VOMITING: 0
CARDIOVASCULAR NEGATIVE: 1
FEVER: 0
RESPIRATORY NEGATIVE: 1
WEAKNESS: 0

## 2018-02-12 ASSESSMENT — PATIENT HEALTH QUESTIONNAIRE - PHQ9
1. LITTLE INTEREST OR PLEASURE IN DOING THINGS: NOT AT ALL
SUM OF ALL RESPONSES TO PHQ9 QUESTIONS 1 AND 2: 0
SUM OF ALL RESPONSES TO PHQ QUESTIONS 1-9: 0
2. FEELING DOWN, DEPRESSED, IRRITABLE, OR HOPELESS: NOT AT ALL

## 2018-02-12 NOTE — PROGRESS NOTES
Report received from night Rn, took over pt care at 7:00 am.  Pt resting in bed, head to toe assessment complete. Call light within  Reach, bed in lowest position. Will continue to monitor hourly.

## 2018-02-12 NOTE — PROGRESS NOTES
Pt NPO for possible WES. Placed call to Lorraine in [place] for estimated time of procedure. Lorraine to contact echo RN and get back to this RN.

## 2018-02-12 NOTE — PROGRESS NOTES
Bedside report received from Lilia LUGO. Patient is tele status, connected to tele monitor. Currently complaining of minimal pain, OK with bringing PRN pain medication in with nighttime medications. Bed ready on tele 7, [phone]. Patient and family notified and updated on plan of care.

## 2018-02-12 NOTE — PROGRESS NOTES
Pt lying down with eyes closed. No distress noted. Pt denies pain at this time. Heat pad to right shoulder. Lt arm IV flushed with NS Pt is now NPO for WES later today.

## 2018-02-12 NOTE — PROGRESS NOTES
"Urszula Persaud is a 57 y.o. female patient.  1. Sepsis, due to unspecified organism (CMS-HCC)    2. Neck pain    3. Pneumonia of right lower lobe due to infectious organism (CMS-HCC)      PMHx: HTN      No Known Allergies  Principal Problem:    Sepsis (CMS-HCC)  Active Problems:    Abscess or cellulitis, neck    Atrial fibrillation with RVR (CMS-HCC)    Hypokalemia    Lactic acidosis    Hyponatremia    CAP (community acquired pneumonia)    Blood pressure 154/97, pulse (!) 103, temperature 37.5 °C (99.5 °F), resp. rate 18, height 1.549 m (5' 1\"), weight 70.6 kg (155 lb 10.3 oz), SpO2 91 %, not currently breastfeeding.    Subjective  doing well  Objective neck with decreased erythema and tenderness but still a good amount of watery drainage  Assessment & Plan   S/P I&D neck  Drain removed  con't IV antibiotics    Millie Perez MD  2/12/2018  "

## 2018-02-12 NOTE — PROGRESS NOTES
2 RN skin check:  Pt has right neck abscess with drain in place. Otherwise, skin intact, no signs of skin breakdown.

## 2018-02-12 NOTE — PROGRESS NOTES
"Progress Note:  2/12/2018, 11:30 AM    S: No acute events. Patient was sleeping comfortably and was easily aroused. Able to move right upper extremity with much less difficulty and pain. Afebrile. Intermittent sinus tachycardia.    O:  Blood pressure 154/97, pulse (!) 103, temperature 37.5 °C (99.5 °F), resp. rate 18, height 1.549 m (5' 1\"), weight 70.6 kg (155 lb 10.3 oz), SpO2 91 %, not currently breastfeeding.    -NAD, awake and alert, appropriate  -Right neck with improved tenderness. Reasonably good range of motion of the right upper extremity at the shoulder. No tenderness at the sternoclavicular joints. Erythema has nearly resolved. Right base of neck incision with gauze in place      A:   Active Hospital Problems    Diagnosis   • Atrial fibrillation with RVR (CMS-HCC) [I48.91]     Priority: High   • Abscess or cellulitis, neck [L03.221, L02.11]     Priority: High   • Sepsis (CMS-HCC) [A41.9]     Priority: High   • Hypokalemia [E87.6]   • Lactic acidosis [E87.2]   • Hyponatremia [E87.1]   • CAP (community acquired pneumonia) [J18.9]     57 showed female with MSSA bacteremia and right shoulder erythema and pain.  Status post I&D of right base of neck by ENT  Appears to have slow clinical improvement  No current clinical signs of right SC joint involvement    P:   -Care per primary team and surgical consultants  -Will likely need follow-up imaging of the right neck, shoulder, and chest at some point  -I will continue to follow. Please contact me with questions, concerns, updates    Gavin Gong M.D.  [facility]  [phone]    "

## 2018-02-12 NOTE — PROGRESS NOTES
Infectious Disease Progress Note    Author: Isidra Khan M.D. Date & Time of service: 2018  1:42 PM    Chief Complaint:  Staph aureus bacteremia    Interval History:  2018-MAXIMUM TEMPERATURE 103.8 wbc 8.4 platelets 142 creatinine 0.6 says she feels slightly better  2/10/2018-transferred to ICU for A. fib with RVR MAXIMUM TEMPERATURE 99.7 underwent drainage of the right neck abscess on 2/10/2018 WBC 10.   2018-MAXIMUM TEMPERATURE 98.8 ongoing neck pain and now complains of pain in her right upper arm   AF WBC 11.9 feels better overall-no new painful sites-denies SE abx  Labs Reviewed, Medications Reviewed and Radiology Reviewed.    Review of Systems:  Review of Systems   Constitutional: Positive for malaise/fatigue. Negative for chills and fever.   Respiratory: Negative for cough and shortness of breath.    Cardiovascular: Negative for chest pain and leg swelling.   Gastrointestinal: Negative for abdominal pain, nausea and vomiting.   Genitourinary: Negative for dysuria.   Musculoskeletal: Positive for myalgias.        Neck pain    Skin: Negative for rash.   Neurological: Negative for speech change.   All other systems reviewed and are negative.      Hemodynamics:  Temp (24hrs), Av °C (98.6 °F), Min:36.5 °C (97.7 °F), Max:37.5 °C (99.5 °F)  Temperature: 37.2 °C (99 °F)  Pulse  Av.2  Min: 70  Max: 180Heart Rate (Monitored): (!) 104  Blood Pressure: 145/95, NIBP: 142/89       Physical Exam:  Physical Exam   Constitutional: She is oriented to person, place, and time. She appears well-developed and well-nourished. No distress.   HENT:   Head: Normocephalic and atraumatic.   Mouth/Throat: No oropharyngeal exudate.   Eyes: EOM are normal. Pupils are equal, round, and reactive to light. No scleral icterus.   Neck: Neck supple.   Right neck no induration   Cardiovascular: Normal rate.    No murmur heard.  IRR   Pulmonary/Chest: Effort normal. No respiratory distress. She has no wheezes.  She has no rales.   Abdominal: She exhibits no distension. There is no tenderness.   Musculoskeletal: She exhibits no edema.   Neurological: She is alert and oriented to person, place, and time.   Skin: No rash noted.   Nursing note and vitals reviewed.      Meds:    Current Facility-Administered Medications:   •  Notify provider if pain remains uncontrolled **AND** Use the numeric rating scale (NRS-11) on regular floors and Critical-Care Pain Observation Tool (CPOT) on ICUs/Trauma to assess pain **AND** Pulse Ox (Oximetry) **AND** Pharmacy Consult Request **AND** If patient difficult to arouse and/or has respiratory depression, stop any opiates that are currently infusing and call a Rapid Response. **AND** oxyCODONE immediate-release **AND** oxyCODONE immediate-release **AND** HYDROmorphone  •  metoprolol  •  Metoprolol Tartrate  •  amiodarone  •  heparin  •  ceFAZolin  •  ketorolac  •  cyclobenzaprine  •  senna-docusate **AND** polyethylene glycol/lytes **AND** magnesium hydroxide **AND** bisacodyl  •  Respiratory Care per Protocol  •  acetaminophen    Labs:  Recent Labs      02/10/18   0257  02/12/18   0425   WBC  10.0  11.9*   RBC  3.41*  3.31*   HEMOGLOBIN  11.4*  11.3*   HEMATOCRIT  31.5*  30.3*   MCV  92.4  91.5   MCH  33.4*  34.1*   RDW  46.7  46.4   PLATELETCT  162*  242   MPV  10.1  9.7   NEUTSPOLYS  60.30   --    LYMPHOCYTES  13.80*   --    MONOCYTES  3.40   --    EOSINOPHILS  0.90   --    BASOPHILS  0.00   --    RBCMORPHOLO  Present   --      Recent Labs      02/10/18   0257  02/12/18   0426   SODIUM  132*  130*   POTASSIUM  3.3*  4.0   CHLORIDE  101  96   CO2  21  27   GLUCOSE  137*  115*   BUN  6*  7*     Recent Labs      02/10/18   0257  02/12/18   0426   ALBUMIN  3.1*   --    TBILIRUBIN  3.6*   --    ALKPHOSPHAT  75   --    TOTPROTEIN  5.8*   --    ALTSGPT  26   --    ASTSGOT  22   --    CREATININE  0.49*  0.41*       Imaging:  Ct-soft Tissue Neck With    Result Date: 2/7/2018 2/7/2018 5:01 PM  HISTORY/REASON FOR EXAM: Constant right shoulder pain. TECHNIQUE/EXAM DESCRIPTION AND NUMBER OF VIEWS:  CT soft tissue neck with contrast. The study was performed on a helical multidetector CT scanner. Contiguous thin section helical images were obtained of the neck from the skull base through the thoracic inlet. 80 mL of Omnipaque 350 nonionic contrast was injected intravenously. Low dose optimization technique was utilized for this CT exam including automated exposure control and adjustment of the mA and/or kV according to patient size. COMPARISON: None. FINDINGS: There are no mass lesions or fluid collections in the deep or superficial soft tissues of the neck. There is no abnormal enhancement. Cervical lymph nodes show normal size and configuration. There is no pathologic adenopathy evident. Vascular enhancement  of the cervical carotid and vertebral arteries and internal jugular veins appears intact. The nasopharynx, oropharynx, and hypopharynx show normal airways and no abnormal soft tissue swelling. The larynx and trachea are unremarkable. The prevertebral soft tissues appear intact. The parapharyngeal spaces show normal symmetry and fat planes. The pterygopalatine and infratemporal fossae appear intact. The tongue and floor of the mouth are unremarkable. The submandibular, sublingual, and submental spaces appear intact. The parotid and submandibular glands show normal size and density. No abnormal calcifications are evident. The thyroid gland it is enlarged and appears homogeneous. The remaining structures at the thoracic inlet and superior mediastinum within the field of view are unremarkable. There is minimal posterior disc space narrowing and posterior osteophytic spurring at C5-6 and C6-7. No intracranial abnormalities are evident.     1.  No acute inflammatory process identified in the soft tissues of the neck. 2.  Thyroid gland enlargement. 3.  Minor degenerative changes posteriorly at C5-6 and  C6-7.    Dx-chest-portable (1 View)    Result Date: 2/9/2018 2/9/2018 6:05 AM HISTORY/REASON FOR EXAM: Chest Pain TECHNIQUE/EXAM DESCRIPTION:  Single AP view of the chest. COMPARISON: None FINDINGS: Overlying cardiac leads are present. The cardiac silhouette appears within normal limits. Atherosclerotic calcification of the aorta is noted.  The central  pulmonary vasculature appears prominent and indistinct. The lungs appear well expanded bilaterally.  Diffuse scattered hazy pulmonary parenchymal opacities are seen. Veil-like opacities are seen in the right lung base. The bony structures appear age-appropriate.     1.  Pulmonary edema and/or infiltrates. 2.  Small layering right pleural effusion 3.  Atherosclerosis    Dx-shoulder 2+ Right    Result Date: 2/6/2018 2/6/2018 9:05 AM HISTORY/REASON FOR EXAM:  Atraumatic Pain/Swelling/Deformity Acute pain of right shoulder after GLF 2 days ago TECHNIQUE/EXAM DESCRIPTION AND NUMBER OF VIEWS:  3 views of the RIGHT shoulder. COMPARISON: None FINDINGS: No acute fracture or dislocation. Mild osteoarthritis of the AC joint and glenohumeral joint     1. No acute osseous abnormality.    Mr-cervical Spine-with & W/o    Result Date: 2/8/2018 2/7/2018 7:03 PM HISTORY/REASON FOR EXAM:  Atraumatic pain, paresis. Right shoulder pain, headache. TECHNIQUE/EXAM DESCRIPTION: MRI of the cervical spine without and with contrast. The study was performed on a [employer] 1.5 Aubrie MRI scanner. T1 sagittal, T2 fast spin-echo sagittal, T1 postcontrast fat-suppressed sagittal, and gradient-echo axial images were obtained of the cervical spine. 15 mL Omniscan contrast were administered  intravenously. COMPARISON:  None. FINDINGS:  There is minimal grade 1 retrolisthesis of and C5 on 6. There is mild and vertebral disc space narrowing at C5-6. The disk spaces are well-maintained and have a normal appearance. The cervical cord has a normal caliber, course and signal intensity. There is no  definite evidence of abnormal enhancement in the cervical cord or spine. Level specific findings as follows: C2-C3: No significant central canal or neural foraminal stenosis. C3-C4: No significant central canal or neural foraminal stenosis. C4-C5: There is mild posterior midline disc bulge which effaces anterior thecal sac. There is borderline central canal stenosis. C5-C6: There is mild broad posterior disc bulge and endplate spurring. There is mild ligamentum flavum hypertrophy. There is mild central canal stenosis. There is mild to moderate right and moderate left neural foraminal stenosis secondary to endplate spurring and facet arthropathy. C6-C7: There is mild broad posterior disc bulge. There is mild ligamentum flavum hypertrophy. There is mild central canal stenosis. There is moderate left neural foraminal stenosis secondary to disc bulge and facet arthropathy. C7-T1: No significant central canal or neural foraminal stenosis. The visualized prevertebral and posterior paraspinous soft tissues are grossly unremarkable.     1.  No acute abnormality or abnormal enhancement in the cervical spine. If there is strong clinical suspicion for discitis/osteomyelitis or epidural infectious process, short interval repeat imaging is recommended. 2.  Multilevel degenerative changes of the cervical spine as described above.    Mr-lumbar Spine-with & W/o    Result Date: 2/8/2018 2/7/2018 7:03 PM HISTORY/REASON FOR EXAM:  Atraumatic back pain. TECHNIQUE/EXAM DESCRIPTION: MRI of the lumbar spine without and with contrast. The study was performed on a [employer] Signa 1.5 Aubrie MRI scanner. T1 sagittal, T2 fast spin-echo sagittal, and T2 axial images were obtained of the lumbar spine. T1 postcontrast fat-suppressed sagittal images were obtained. 15 mL Omniscan contrast was administered intravenously. COMPARISON:  None. FINDINGS: Marrow signal intensity is normal. There is no abnormal enhancement. Vertebral body heights are  preserved. Vertebral alignment is normal. There is mild intervertebral disc space narrowing at L5-S1 with decrease of the normal T2 signal intensity within the disc space consistent with mild degenerative disc desiccation. The spinal cord demonstrates normal course and caliber. There is no abnormal cord enhancement. Conus terminates at the T12/L1 level. Level-specific findings as follows: L5-S1: There is mild broad posterior disc bulge. There is mild facet arthropathy. There is mild bilateral neural foraminal stenosis. L4-5: There is mild diffuse disc bulge. There is minimal bilateral neural foraminal stenosis. L3-4: No significant central canal or neural foraminal stenosis. L2-3: No significant central canal or neural foraminal stenosis. L1-2: No significant central canal or neural foraminal stenosis. The visualized prevertebral and posterior paraspinous soft tissues are grossly unremarkable.     1.  No acute abnormality or abnormal enhancement in the lumbar spine. If there is strong clinical suspicion for discitis/osteomyelitis or epidural infectious process, short interval repeat imaging is recommended. 2.  Multilevel degenerative changes of the lumbar spine as described above.    Mr-shoulder-w/o Right    Addendum Date: 2/8/2018    I further discussed this case with Dr. Badlilo at 4:20 PM on 2/8/2018. The patient is exquisitely tender over the medial clavicle. That area is not included on this study. There is however likely some edema and induration of the soft tissues surrounding the mid and medial clavicle at the edge of the images. Review of CT scan of the neck and chest from the same date demonstrates induration of the soft tissues surrounding the right medial clavicle as well as centered in the area of the sternoclavicular joint. These findings would be suspicious for septic arthritis/osteomyelitis of the right sternoclavicular joint.    Result Date: 2/8/2018 2/7/2018 7:03 PM HISTORY/REASON FOR EXAM:  Right-sided shoulder pain TECHNIQUE/EXAM DESCRIPTION: MRI of the RIGHT shoulder without contrast. Using a [employer] Signa 1.5 Aubrie MRI scanner, T1 sagittal, fast spin-echo T2 fat-suppressed oblique coronal, sagittal, and axial and intermediate fast spin-echo oblique coronal images were obtained. COMPARISON: None. FINDINGS: Osseous acromial outlet: The acromion demonstrates a curved undersurface. There is no lateral downsloping. There is no evidence of an inferiorly directed subacromial enthesophyte. There is mild degenerative change of the acromioclavicular joint. There are no inferiorly directed osteophytes. There is minimal fluid seen within the subacromial/subdeltoid bursa. Rotator cuff: There is tendinopathy involving the supraspinatus tendon with mild fraying of the bursal surface fibers. No evidence of full-thickness tear or retraction. Glenoid labrum: There is no definite superior labral tear or evidence of disruption of the biceps anchor. There is a multiloculated fluid signal focus seen anterior to the subscapularis tendon which measures 2.4 x 1.4 x 1.1 cm in size. This extends towards the anterior/superior labrum. The anterior/inferior and posterior/inferior glenoid labrum are intact. Osseous structures/Cartilaginous surfaces: No evidence of marrow edema.  Cartilaginous surfaces are well maintained. Miscellaneous: No significant joint effusion. The long head of the biceps tendon is appropriately situated within the bicipital groove.     1.  Mild tendinopathy of the supraspinatus tendon with very mild fraying of the bursal surface fibers. No evidence of full-thickness tear or retraction. 2.  No definite tear of the glenoid labrum. There is however a multiloculated fluid signal focus present anterior to the subscapularis tendon which extends towards the anterior/superior labrum. This may represent a ganglion cyst but can also potential he  represent a paralabral cyst related to nonvisualized superior labral tear.  3.  Mild degenerative change of the acromioclavicular joint.    Mr-thoracic Spine-with & W/o    Result Date: 2/8/2018 2/7/2018 7:03 PM HISTORY/REASON FOR EXAM: Atraumatic back and neck pain. TECHNIQUE/EXAM DESCRIPTION: MRI of the thoracic spine without and with contrast. The study was performed on a [employer] Signa 1.5 Aubrie MRI scanner. T1 sagittal, T1 postcontrast fat-suppressed sagittal, T2 sagittal, and T2 axial images were obtained of the thoracic spine. 15 mL Omniscan contrast were administered intravenously. COMPARISON:  None FINDINGS: The thoracic vertebral bodies have a normal height and alignment. The thoracic disks have a normal appearance and signal intensity. The thoracic cord has a normal caliber, course and signal intensity. There is no evidence of abnormal enhancement in the thoracic cord or spine. There is no evidence of disk extrusion, cord compression, central canal stenosis, or neural foraminal stenosis. There is atelectasis and/or consolidation in the visualized portion of the right lung base.     1.  No acute abnormality or abnormal enhancement in the thoracic spine. If there is strong clinical suspicion for discitis/osteomyelitis or epidural infectious process, short interval repeat imaging is recommended. 2.  Possible right lung base pneumonitis.    Echocardiogram Comp W/o Cont    Result Date: 2/9/2018  Transthoracic Echo Report Echocardiography Laboratory CONCLUSIONS No prior study is available for comparison. Normal left ventricular chamber size. Left ventricular ejection fraction is visually estimated to be 60%. No significant valve abnormalities. Estimated right ventricular systolic pressure is 45 mmHg. auto-interpretation LV EF:  60    % FINDINGS Left Ventricle Normal left ventricular chamber size. Normal left ventricular wall thickness. Normal left ventricular systolic function. Left ventricular ejection fraction is visually estimated to be 60%. Normal regional wall motion. Normal diastolic  function. Right Ventricle The right ventricle was normal in size and function. Right Atrium The right atrium is normal in size. Normal inferior vena cava size and inspiratory collapse. Left Atrium The left atrium is normal in size. Left atrial volume index is 21 mL/sq m. Mitral Valve Structurally normal mitral valve without significant stenosis or regurgitation. Aortic Valve Structurally normal aortic valve without significant stenosis or regurgitation. Tricuspid Valve Structurally normal tricuspid valve. No tricuspid stenosis. Trace tricuspid regurgitation. Estimated right ventricular systolic pressure is 45 mmHg. Pulmonic Valve Structurally normal pulmonic valve without significant stenosis or regurgitation. Pericardium Normal pericardium without effusion. Aorta The aortic root is normal. Ascending aorta diameter is 2.9 cm. Vinnie Naqvi MD (Electronically Signed) Final Date:     09 February 2018                 14:50    Ct-cta Chest Pulmonary Artery W/ Recons    Result Date: 2/7/2018 2/7/2018 5:01 PM HISTORY/REASON FOR EXAM:  Chest pain. TECHNIQUE/EXAM DESCRIPTION: CT angiogram scan for pulmonary embolism with contrast, with reconstructions. 1.25 mm helical sections were obtained from the lung apices through the lung bases following the rapid bolus administration of 80 mL of Omnipaque 350 nonionic contrast. Thin-section overlapping reconstruction interval was utilized. Coronal reconstructions were generated from the axial data. MIP post processing was performed and utilized for the interpretation. Low dose optimization technique was utilized for this CT exam including automated exposure control and adjustment of the mA and/or kV according to patient size. COMPARISON: None FINDINGS: Pulmonary Embolism: No. Main Pulmonary Arteries: No. Segmental branches: No. Subsegmental branches: No. Additional Comments: The heart is normal in size and configuration. Lungs: There is minimal atelectasis or pneumonia in the  posterior aspect of the right lower lobe and minimally in the anterior inferior aspect of the lingula. The remainder of the lung parenchyma is clear. Pleura: No pleural effusion. Nodes: No enlarged lymph nodes. Additional findings: There is mild enlargement of both adrenal glands. No hydronephrosis is visualized     1.  No evidence of pulmonary embolism. 2.  Minimal atelectasis or pneumonia in the right lower lobe and in the lingula. 3.  Mild bilateral adrenal gland enlargement. Significance unknown.       Micro:  Results     Procedure Component Value Units Date/Time    CULTURE WOUND W/ GRAM STAIN [[id number]]  (Abnormal)  (Susceptibility) Collected:  02/10/18 1235    Order Status:  Completed Specimen:  Wound Updated:  02/12/18 1104     Gram Stain Result --     Few WBCs.  Rare Gram positive cocci.       Significant Indicator POS (POS)     Source WND     Site Neck Abscess     Culture Result Wound -- (A)     Culture Result Wound -- (A)     Staphylococcus aureus  Light growth      Culture & Susceptibility     STAPHYLOCOCCUS AUREUS     Antibiotic Sensitivity Microscan Unit Status    Ampicillin/sulbactam Sensitive <=8/4 mcg/mL Final    Clindamycin Sensitive <=0.5 mcg/mL Final    Daptomycin Sensitive 1 mcg/mL Final    Erythromycin Sensitive <=0.5 mcg/mL Final    Moxifloxacin Sensitive <=0.5 mcg/mL Final    Oxacillin Sensitive <=0.25 mcg/mL Final    Tetracycline Sensitive <=4 mcg/mL Final    Trimeth/Sulfa Sensitive <=0.5/9.5 mcg/mL Final    Vancomycin Sensitive 2 mcg/mL Final                       ANAEROBIC CULTURE [[id number]] Collected:  02/10/18 1235    Order Status:  Completed Specimen:  Wound Updated:  02/12/18 1104     Significant Indicator NEG     Source WND     Site Neck Abscess     Anaerobic Culture, Culture Res Culture in progress.    BLOOD CULTURE [[id number]]  (Abnormal) Collected:  02/09/18 1641    Order Status:  Completed Specimen:  Blood from Peripheral Updated:  02/12/18 1039     Significant Indicator POS  "(POS)     Source BLD     Site PERIPHERAL     Blood Culture -- (A)     Growth detected by Bactec instrument.  02/11/2018  13:33  Staphylococcus aureus (methicillin sensitive)  detected by PCR.  Susceptibility to follow.       Blood Culture -- (A)     Staphylococcus aureus  See previous culture for sensitivity report.      Narrative:       CALL  [place]  161 tel. [phone],  CALLED  161 tel. [phone] 02/11/2018, 13:33, RB PERF. RESULTS CALLED  TO:[phone],RN;[facility]  Per Hospital Policy: Only change Specimen Src: to \"Line\" if  specified by physician order.    GRAM STAIN [[id number]] Collected:  02/10/18 1235    Order Status:  Completed Specimen:  Wound Updated:  02/11/18 0851     Significant Indicator .     Source WND     Site Neck Abscess     Gram Stain Result --     Few WBCs.  Rare Gram positive cocci.      BLOOD CULTURE [[id number]]  (Abnormal)  (Susceptibility) Collected:  02/07/18 1848    Order Status:  Completed Specimen:  Blood from Peripheral Updated:  02/10/18 0842     Significant Indicator POS (POS)     Source BLD     Site PERIPHERAL     Blood Culture -- (A)     Growth detected by Bactec instrument.  02/08/2018  08:43  Staphylococcus aureus (methicillin sensitive)  detected by PCR.       Blood Culture Staphylococcus aureus (A)    Narrative:       CALL  [facility]  171 tel. [phone],  CALLED  171 tel. [phone] 02/08/2018, 08:49, RB PERF. RESULTS CALLED  TO:[id number] and DILAN  2 of 2 blood culture x2  Sites order. Per Hospital Policy:  Only change Specimen Src: to \"Line\" if specified by physician  order.    Culture & Susceptibility     STAPHYLOCOCCUS AUREUS     Antibiotic Sensitivity Microscan Unit Status    Ampicillin/sulbactam Sensitive <=8/4 mcg/mL Final    Clindamycin Sensitive <=0.5 mcg/mL Final    Daptomycin Sensitive <=0.5 mcg/mL Final    Erythromycin Sensitive <=0.5 mcg/mL Final    Moxifloxacin Sensitive <=0.5 mcg/mL Final    Oxacillin Sensitive <=0.25 mcg/mL Final    Tetracycline Sensitive <=4 mcg/mL " "Final    Trimeth/Sulfa Sensitive <=0.5/9.5 mcg/mL Final    Vancomycin Sensitive 1 mcg/mL Final                       BLOOD CULTURE [[id number]]  (Abnormal) Collected:  02/07/18 1848    Order Status:  Completed Specimen:  Blood from Peripheral Updated:  02/10/18 0842     Significant Indicator POS (POS)     Source BLD     Site PERIPHERAL     Blood Culture Growth detected by Bactec instrument.  02/08/2018  08:50 (A)     Blood Culture -- (A)     Staphylococcus aureus  See previous culture for sensitivity report.      Narrative:       CALL  [facility]  171 tel. [phone],  CALLED  171 tel. [phone] 02/08/2018, 08:50, RB PERF. RESULTS CALLED  TO:[phone] and DILAN  1 of 2 for Blood Culture x 2 sites order. Per Hospital  Policy: Only change Specimen Src: to \"Line\" if specified by  physician order.    BLOOD CULTURE [[id number]] Collected:  02/09/18 1641    Order Status:  Completed Specimen:  Blood from Peripheral Updated:  02/10/18 0759     Significant Indicator NEG     Source BLD     Site PERIPHERAL     Blood Culture --     No Growth    Note: Blood cultures are incubated for 5 days and  are monitored continuously.Positive blood cultures  are called to the RN and reported as soon as  they are identified.      Narrative:       Per Hospital Policy: Only change Specimen Src: to \"Line\" if  specified by physician order.    FLUID CULTURE W/GRAM STAIN [[id number]]     Order Status:  Canceled Specimen:  Body Fluid from Other Body Fluid     Urinalysis [[id number]] Collected:  02/08/18 0539    Order Status:  Completed Specimen:  Urine from Urine, Clean Catch Updated:  02/08/18 0557     Color Yellow     Character Clear     Specific Gravity 1.012     Ph 6.0     Glucose Negative mg/dL      Ketones Negative mg/dL      Protein Negative mg/dL      Bilirubin Negative     Urobilinogen, Urine 0.2     Nitrite Negative     Leukocyte Esterase Negative     Occult Blood Negative     Micro Urine Req see below     Comment: Microscopic examination not performed " when specimen is clear  and chemically negative for protein, blood, leukocyte esterase  and nitrite.         Narrative:       If not done within the last 24 hours    Blood Culture [[id number]]     Order Status:  Canceled Specimen:  Blood from Peripheral     Blood Culture [[id number]]     Order Status:  Canceled Specimen:  Blood from Peripheral     Culture Respiratory W/ GRM STN [[id number]]     Order Status:  Canceled Specimen:  Respirate from Sputum     Blood Culture [[id number]] Collected:  02/07/18 0000    Order Status:  Canceled Specimen:  Other from Peripheral     Blood Culture [[id number]] Collected:  02/07/18 0000    Order Status:  Canceled Specimen:  Other from Peripheral     Culture Respiratory W/ GRM STN [[id number]] Collected:  02/07/18 0000    Order Status:  Canceled Specimen:  Sputum from Sputum           Assessment:  Active Hospital Problems    Diagnosis   • *Sepsis (CMS-HCC) [A41.9]   • Hypokalemia [E87.6]   • Lactic acidosis [E87.2]   • Neck pain [M54.2]   • Hyponatremia [E87.1]   • CAP (community acquired pneumonia) [J18.9]       Plan:  MSSA sepsis, additional work  Fever resolved  +leukocytosis  Blood cultures + 2/7 and 2/9  Repeat blood cultures ordered  TTE is negative from 2/9/28-Check WES  MRI T spine neg  MRI shoulder no annika abscess  PICC when blood cxs neg for 48 hours  Anticipate 4 weeks IV abx from date of neg blood cxs    Right neck abscess  Underwent I&D on 2/10/2018  Cultures +MSSA  Wound care  Abx as above    Pneumonia  MSSA  Abx as above  Monitor closely for complications    Discussed with internal medicine.

## 2018-02-12 NOTE — PROGRESS NOTES
Received report from [facility] BRITTNEY Montoya. Pt A/O x4. Pt placed in bed. Tele monitor applied. VS taken by tech. Pt received pain med prior to arrival. Pt assessed. Given hot pack to apply to rt shoulder. Pt in no distress. Plan of care discussed.

## 2018-02-12 NOTE — PROGRESS NOTES
Call received from cath lab. Pt to be rescheduled for WES tomorrow. Will reorder previous diet and make patient NPO at midnight.

## 2018-02-12 NOTE — CARE PLAN
Problem: Knowledge Deficit  Goal: Knowledge of disease process/condition, treatment plan, diagnostic tests, and medications will improve    Intervention: Assess knowledge level of disease process/condition, treatment plan, diagnostic tests, and medications  Knowledge of disease process/condition, treatment plan, diagnostic tests, and medications will improve:  Educated patient on plan of care, WES procedure and pain management.         Problem: Pain Management  Goal: Pain level will decrease to patient's comfort goal    Intervention: Follow pain managment plan developed in collaboration with patient and Interdisciplinary Team  Pt pain assessed. Pain managed through pharmacological methods. Pt reports manageable pain level.

## 2018-02-12 NOTE — PROGRESS NOTES
Patient transferred to [facility] with 2 RN transport on tele box with belongings and chart and daughter. Notified pharmacy to send 2200 antibiotic to 7th floor - otherwise no additional medications sent with patient. Connected to 7's tele box, RN at bedside.

## 2018-02-13 LAB
ALBUMIN SERPL BCP-MCNC: 3 G/DL (ref 3.2–4.9)
ALBUMIN/GLOB SERPL: 0.9 G/DL
ALP SERPL-CCNC: 199 U/L (ref 30–99)
ALT SERPL-CCNC: 77 U/L (ref 2–50)
ANION GAP SERPL CALC-SCNC: 10 MMOL/L (ref 0–11.9)
AST SERPL-CCNC: 59 U/L (ref 12–45)
BACTERIA BLD CULT: ABNORMAL
BACTERIA BLD CULT: ABNORMAL
BACTERIA SPEC ANAEROBE CULT: NORMAL
BASOPHILS # BLD AUTO: 0.7 % (ref 0–1.8)
BASOPHILS # BLD: 0.08 K/UL (ref 0–0.12)
BILIRUB SERPL-MCNC: 2.1 MG/DL (ref 0.1–1.5)
BUN SERPL-MCNC: 11 MG/DL (ref 8–22)
CALCIUM SERPL-MCNC: 8.9 MG/DL (ref 8.5–10.5)
CHLORIDE SERPL-SCNC: 98 MMOL/L (ref 96–112)
CO2 SERPL-SCNC: 27 MMOL/L (ref 20–33)
CREAT SERPL-MCNC: 0.42 MG/DL (ref 0.5–1.4)
EOSINOPHIL # BLD AUTO: 0.25 K/UL (ref 0–0.51)
EOSINOPHIL NFR BLD: 2.2 % (ref 0–6.9)
ERYTHROCYTE [DISTWIDTH] IN BLOOD BY AUTOMATED COUNT: 45.8 FL (ref 35.9–50)
GLOBULIN SER CALC-MCNC: 3.4 G/DL (ref 1.9–3.5)
GLUCOSE SERPL-MCNC: 126 MG/DL (ref 65–99)
HCT VFR BLD AUTO: 32.9 % (ref 37–47)
HGB BLD-MCNC: 12.1 G/DL (ref 12–16)
IMM GRANULOCYTES # BLD AUTO: 0.5 K/UL (ref 0–0.11)
IMM GRANULOCYTES NFR BLD AUTO: 4.4 % (ref 0–0.9)
LV EJECT FRACT  99904: 65
LYMPHOCYTES # BLD AUTO: 3.45 K/UL (ref 1–4.8)
LYMPHOCYTES NFR BLD: 30.4 % (ref 22–41)
MAGNESIUM SERPL-MCNC: 2.1 MG/DL (ref 1.5–2.5)
MCH RBC QN AUTO: 33.5 PG (ref 27–33)
MCHC RBC AUTO-ENTMCNC: 36.8 G/DL (ref 33.6–35)
MCV RBC AUTO: 91.1 FL (ref 81.4–97.8)
MONOCYTES # BLD AUTO: 1.07 K/UL (ref 0–0.85)
MONOCYTES NFR BLD AUTO: 9.4 % (ref 0–13.4)
NEUTROPHILS # BLD AUTO: 6.01 K/UL (ref 2–7.15)
NEUTROPHILS NFR BLD: 52.9 % (ref 44–72)
NRBC # BLD AUTO: 0.05 K/UL
NRBC BLD-RTO: 0.4 /100 WBC
PLATELET # BLD AUTO: 290 K/UL (ref 164–446)
PMV BLD AUTO: 9.3 FL (ref 9–12.9)
POTASSIUM SERPL-SCNC: 3.8 MMOL/L (ref 3.6–5.5)
PROT SERPL-MCNC: 6.4 G/DL (ref 6–8.2)
RBC # BLD AUTO: 3.61 M/UL (ref 4.2–5.4)
SIGNIFICANT IND 70042: ABNORMAL
SIGNIFICANT IND 70042: NORMAL
SITE SITE: ABNORMAL
SITE SITE: NORMAL
SODIUM SERPL-SCNC: 135 MMOL/L (ref 135–145)
SOURCE SOURCE: ABNORMAL
SOURCE SOURCE: NORMAL
WBC # BLD AUTO: 11.4 K/UL (ref 4.8–10.8)

## 2018-02-13 PROCEDURE — 700102 HCHG RX REV CODE 250 W/ 637 OVERRIDE(OP): Performed by: INTERNAL MEDICINE

## 2018-02-13 PROCEDURE — A9270 NON-COVERED ITEM OR SERVICE: HCPCS | Performed by: HOSPITALIST

## 2018-02-13 PROCEDURE — 302152 K-PAD 12X17: Performed by: INTERNAL MEDICINE

## 2018-02-13 PROCEDURE — 302131 K PAD MOTOR: Performed by: INTERNAL MEDICINE

## 2018-02-13 PROCEDURE — 700111 HCHG RX REV CODE 636 W/ 250 OVERRIDE (IP)

## 2018-02-13 PROCEDURE — A9270 NON-COVERED ITEM OR SERVICE: HCPCS | Performed by: INTERNAL MEDICINE

## 2018-02-13 PROCEDURE — 93312 ECHO TRANSESOPHAGEAL: CPT

## 2018-02-13 PROCEDURE — 93321 DOPPLER ECHO F-UP/LMTD STD: CPT

## 2018-02-13 PROCEDURE — 87040 BLOOD CULTURE FOR BACTERIA: CPT | Mod: 91

## 2018-02-13 PROCEDURE — 770020 HCHG ROOM/CARE - TELE (206)

## 2018-02-13 PROCEDURE — 85025 COMPLETE CBC W/AUTO DIFF WBC: CPT

## 2018-02-13 PROCEDURE — 700111 HCHG RX REV CODE 636 W/ 250 OVERRIDE (IP): Performed by: INTERNAL MEDICINE

## 2018-02-13 PROCEDURE — 160002 HCHG RECOVERY MINUTES (STAT)

## 2018-02-13 PROCEDURE — 700102 HCHG RX REV CODE 250 W/ 637 OVERRIDE(OP): Performed by: HOSPITALIST

## 2018-02-13 PROCEDURE — 93325 DOPPLER ECHO COLOR FLOW MAPG: CPT

## 2018-02-13 PROCEDURE — 99232 SBSQ HOSP IP/OBS MODERATE 35: CPT | Performed by: INTERNAL MEDICINE

## 2018-02-13 PROCEDURE — 80053 COMPREHEN METABOLIC PANEL: CPT

## 2018-02-13 PROCEDURE — B24BZZ4 ULTRASONOGRAPHY OF HEART WITH AORTA, TRANSESOPHAGEAL: ICD-10-PCS | Performed by: INTERNAL MEDICINE

## 2018-02-13 PROCEDURE — 83735 ASSAY OF MAGNESIUM: CPT

## 2018-02-13 PROCEDURE — 36415 COLL VENOUS BLD VENIPUNCTURE: CPT

## 2018-02-13 PROCEDURE — 700111 HCHG RX REV CODE 636 W/ 250 OVERRIDE (IP): Performed by: HOSPITALIST

## 2018-02-13 RX ADMIN — OXYCODONE HYDROCHLORIDE 5 MG: 5 TABLET ORAL at 04:24

## 2018-02-13 RX ADMIN — OXYCODONE HYDROCHLORIDE 5 MG: 5 TABLET ORAL at 20:44

## 2018-02-13 RX ADMIN — OXYCODONE HYDROCHLORIDE 10 MG: 10 TABLET ORAL at 09:41

## 2018-02-13 RX ADMIN — AMIODARONE HYDROCHLORIDE 200 MG: 200 TABLET ORAL at 20:58

## 2018-02-13 RX ADMIN — CEFAZOLIN SODIUM 2 G: 2 INJECTION, SOLUTION INTRAVENOUS at 13:57

## 2018-02-13 RX ADMIN — METOPROLOL TARTRATE 25 MG: 25 TABLET, FILM COATED ORAL at 20:58

## 2018-02-13 RX ADMIN — OXYCODONE HYDROCHLORIDE 5 MG: 5 TABLET ORAL at 13:57

## 2018-02-13 RX ADMIN — METOPROLOL TARTRATE 25 MG: 25 TABLET, FILM COATED ORAL at 09:41

## 2018-02-13 RX ADMIN — CEFAZOLIN SODIUM 2 G: 2 INJECTION, SOLUTION INTRAVENOUS at 06:10

## 2018-02-13 RX ADMIN — STANDARDIZED SENNA CONCENTRATE AND DOCUSATE SODIUM 2 TABLET: 8.6; 5 TABLET, FILM COATED ORAL at 20:58

## 2018-02-13 RX ADMIN — HEPARIN SODIUM 5000 UNITS: 5000 INJECTION, SOLUTION INTRAVENOUS; SUBCUTANEOUS at 20:58

## 2018-02-13 RX ADMIN — HEPARIN SODIUM 5000 UNITS: 5000 INJECTION, SOLUTION INTRAVENOUS; SUBCUTANEOUS at 13:57

## 2018-02-13 RX ADMIN — AMIODARONE HYDROCHLORIDE 200 MG: 200 TABLET ORAL at 09:41

## 2018-02-13 RX ADMIN — CEFAZOLIN SODIUM 2 G: 2 INJECTION, SOLUTION INTRAVENOUS at 20:58

## 2018-02-13 ASSESSMENT — ENCOUNTER SYMPTOMS
DIZZINESS: 0
PALPITATIONS: 0
FEVER: 0
HEADACHES: 0
VOMITING: 0
MYALGIAS: 1
BACK PAIN: 1
MYALGIAS: 0
CHILLS: 0
SHORTNESS OF BREATH: 0
NAUSEA: 0
SPEECH CHANGE: 0
NECK PAIN: 1
ABDOMINAL PAIN: 0
COUGH: 0

## 2018-02-13 ASSESSMENT — PAIN SCALES - GENERAL
PAINLEVEL_OUTOF10: 0
PAINLEVEL_OUTOF10: 0
PAINLEVEL_OUTOF10: 5
PAINLEVEL_OUTOF10: 6
PAINLEVEL_OUTOF10: 8
PAINLEVEL_OUTOF10: 4
PAINLEVEL_OUTOF10: 5
PAINLEVEL_OUTOF10: 4
PAINLEVEL_OUTOF10: 4

## 2018-02-13 NOTE — PROGRESS NOTES
Assumed care of patient. Assessment complete. Patient complains of right shoulder pain. Educated to use call light for assistance. Patient is a LOW RISK according to [facility] Fall Risk Assessment. Tele box in place. Will continue to monitor with hourly rounding.

## 2018-02-13 NOTE — PROGRESS NOTES
Bedside report with BRITTNEY Michele   Patient sitting up at the edge of the bed having dinner. Friend visiting at bedside. A+OX4, RA , SR-ST on monitor. Surgical drain removed today from right neck/shoulder area. Site assessed. Appears to be healing well. Patient complaining of some itching and pain around the site. Day RN changing dressing and tape and also administered pain medication. Plan of care discussed. Patient to go for WES tomorrow. NPO s/p midnight. Call bell and personal belongings within reach. Will continue to monitor.

## 2018-02-13 NOTE — PROGRESS NOTES
Infectious Disease Progress Note    Author: Isidra Khan M.D. Date & Time of service: 2018  3:35 PM    Chief Complaint:  Staph aureus bacteremia    Interval History:  2018-MAXIMUM TEMPERATURE 103.8 wbc 8.4 platelets 142 creatinine 0.6 says she feels slightly better  2/10/2018-transferred to ICU for A. fib with RVR MAXIMUM TEMPERATURE 99.7 underwent drainage of the right neck abscess on 2/10/2018 WBC 10.   2018-MAXIMUM TEMPERATURE 98.8 ongoing neck pain and now complains of pain in her right upper arm   AF WBC 11.9 feels better overall-no new painful sites-denies SE abx   AF WBC 11.4 just returned from [facility]-tolerated well  Labs Reviewed, Medications Reviewed and Radiology Reviewed.    Review of Systems:  Review of Systems   Constitutional: Positive for malaise/fatigue. Negative for chills and fever.   Respiratory: Negative for cough and shortness of breath.    Cardiovascular: Negative for chest pain and leg swelling.   Gastrointestinal: Negative for abdominal pain, nausea and vomiting.   Genitourinary: Negative for dysuria.   Musculoskeletal: Positive for myalgias.        Neck pain improved   Skin: Negative for rash.   Neurological: Negative for speech change.   All other systems reviewed and are negative.      Hemodynamics:  Temp (24hrs), Av.8 °C (98.2 °F), Min:36.1 °C (97 °F), Max:37.3 °C (99.1 °F)  Temperature: 36.4 °C (97.6 °F)  Pulse  Av  Min: 70  Max: 180  Blood Pressure: 136/94, NIBP: 129/87       Physical Exam:  Physical Exam   Constitutional: She is oriented to person, place, and time. She appears well-developed and well-nourished. No distress.   HENT:   Head: Normocephalic and atraumatic.   Mouth/Throat: No oropharyngeal exudate.   Eyes: EOM are normal. Pupils are equal, round, and reactive to light. No scleral icterus.   Neck: Neck supple.   Right neck no induration-sutures intact   Cardiovascular: Normal rate.    No murmur heard.  IRR   Pulmonary/Chest: Effort normal.  No respiratory distress. She has no wheezes. She has no rales.   Abdominal: She exhibits no distension. There is no tenderness.   Musculoskeletal: She exhibits no edema.   Neurological: She is alert and oriented to person, place, and time.   Skin: No rash noted.   Nursing note and vitals reviewed.      Meds:    Current Facility-Administered Medications:   •  ondansetron  •  Notify provider if pain remains uncontrolled **AND** Use the numeric rating scale (NRS-11) on regular floors and Critical-Care Pain Observation Tool (CPOT) on ICUs/Trauma to assess pain **AND** Pulse Ox (Oximetry) **AND** Pharmacy Consult Request **AND** If patient difficult to arouse and/or has respiratory depression, stop any opiates that are currently infusing and call a Rapid Response. **AND** oxyCODONE immediate-release **AND** oxyCODONE immediate-release **AND** HYDROmorphone  •  metoprolol  •  Metoprolol Tartrate  •  amiodarone  •  heparin  •  ceFAZolin  •  ketorolac  •  cyclobenzaprine  •  senna-docusate **AND** polyethylene glycol/lytes **AND** magnesium hydroxide **AND** bisacodyl  •  Respiratory Care per Protocol  •  acetaminophen    Labs:  Recent Labs      02/12/18 0425 02/13/18 0142   WBC  11.9*  11.4*   RBC  3.31*  3.61*   HEMOGLOBIN  11.3*  12.1   HEMATOCRIT  30.3*  32.9*   MCV  91.5  91.1   MCH  34.1*  33.5*   RDW  46.4  45.8   PLATELETCT  242  290   MPV  9.7  9.3   NEUTSPOLYS   --   52.90   LYMPHOCYTES   --   30.40   MONOCYTES   --   9.40   EOSINOPHILS   --   2.20   BASOPHILS   --   0.70     Recent Labs      02/12/18 0426 02/13/18 0143   SODIUM  130*  135   POTASSIUM  4.0  3.8   CHLORIDE  96  98   CO2  27  27   GLUCOSE  115*  126*   BUN  7*  11     Recent Labs      02/12/18 0426 02/13/18 0143   ALBUMIN   --   3.0*   TBILIRUBIN   --   2.1*   ALKPHOSPHAT   --   199*   TOTPROTEIN   --   6.4   ALTSGPT   --   77*   ASTSGOT   --   59*   CREATININE  0.41*  0.42*       Imaging:  Ct-soft Tissue Neck With    Result Date:  2/7/2018 2/7/2018 5:01 PM HISTORY/REASON FOR EXAM: Constant right shoulder pain. TECHNIQUE/EXAM DESCRIPTION AND NUMBER OF VIEWS:  CT soft tissue neck with contrast. The study was performed on a helical multidetector CT scanner. Contiguous thin section helical images were obtained of the neck from the skull base through the thoracic inlet. 80 mL of Omnipaque 350 nonionic contrast was injected intravenously. Low dose optimization technique was utilized for this CT exam including automated exposure control and adjustment of the mA and/or kV according to patient size. COMPARISON: None. FINDINGS: There are no mass lesions or fluid collections in the deep or superficial soft tissues of the neck. There is no abnormal enhancement. Cervical lymph nodes show normal size and configuration. There is no pathologic adenopathy evident. Vascular enhancement  of the cervical carotid and vertebral arteries and internal jugular veins appears intact. The nasopharynx, oropharynx, and hypopharynx show normal airways and no abnormal soft tissue swelling. The larynx and trachea are unremarkable. The prevertebral soft tissues appear intact. The parapharyngeal spaces show normal symmetry and fat planes. The pterygopalatine and infratemporal fossae appear intact. The tongue and floor of the mouth are unremarkable. The submandibular, sublingual, and submental spaces appear intact. The parotid and submandibular glands show normal size and density. No abnormal calcifications are evident. The thyroid gland it is enlarged and appears homogeneous. The remaining structures at the thoracic inlet and superior mediastinum within the field of view are unremarkable. There is minimal posterior disc space narrowing and posterior osteophytic spurring at C5-6 and C6-7. No intracranial abnormalities are evident.     1.  No acute inflammatory process identified in the soft tissues of the neck. 2.  Thyroid gland enlargement. 3.  Minor degenerative changes  posteriorly at C5-6 and C6-7.    Dx-chest-portable (1 View)    Result Date: 2/9/2018 2/9/2018 6:05 AM HISTORY/REASON FOR EXAM: Chest Pain TECHNIQUE/EXAM DESCRIPTION:  Single AP view of the chest. COMPARISON: None FINDINGS: Overlying cardiac leads are present. The cardiac silhouette appears within normal limits. Atherosclerotic calcification of the aorta is noted.  The central  pulmonary vasculature appears prominent and indistinct. The lungs appear well expanded bilaterally.  Diffuse scattered hazy pulmonary parenchymal opacities are seen. Veil-like opacities are seen in the right lung base. The bony structures appear age-appropriate.     1.  Pulmonary edema and/or infiltrates. 2.  Small layering right pleural effusion 3.  Atherosclerosis    Dx-shoulder 2+ Right    Result Date: 2/6/2018 2/6/2018 9:05 AM HISTORY/REASON FOR EXAM:  Atraumatic Pain/Swelling/Deformity Acute pain of right shoulder after GLF 2 days ago TECHNIQUE/EXAM DESCRIPTION AND NUMBER OF VIEWS:  3 views of the RIGHT shoulder. COMPARISON: None FINDINGS: No acute fracture or dislocation. Mild osteoarthritis of the AC joint and glenohumeral joint     1. No acute osseous abnormality.    Mr-cervical Spine-with & W/o    Result Date: 2/8/2018 2/7/2018 7:03 PM HISTORY/REASON FOR EXAM:  Atraumatic pain, paresis. Right shoulder pain, headache. TECHNIQUE/EXAM DESCRIPTION: MRI of the cervical spine without and with contrast. The study was performed on a [employer] Signa 1.5 Aubrie MRI scanner. T1 sagittal, T2 fast spin-echo sagittal, T1 postcontrast fat-suppressed sagittal, and gradient-echo axial images were obtained of the cervical spine. 15 mL Omniscan contrast were administered  intravenously. COMPARISON:  None. FINDINGS:  There is minimal grade 1 retrolisthesis of and C5 on 6. There is mild and vertebral disc space narrowing at C5-6. The disk spaces are well-maintained and have a normal appearance. The cervical cord has a normal caliber, course and signal  intensity. There is no definite evidence of abnormal enhancement in the cervical cord or spine. Level specific findings as follows: C2-C3: No significant central canal or neural foraminal stenosis. C3-C4: No significant central canal or neural foraminal stenosis. C4-C5: There is mild posterior midline disc bulge which effaces anterior thecal sac. There is borderline central canal stenosis. C5-C6: There is mild broad posterior disc bulge and endplate spurring. There is mild ligamentum flavum hypertrophy. There is mild central canal stenosis. There is mild to moderate right and moderate left neural foraminal stenosis secondary to endplate spurring and facet arthropathy. C6-C7: There is mild broad posterior disc bulge. There is mild ligamentum flavum hypertrophy. There is mild central canal stenosis. There is moderate left neural foraminal stenosis secondary to disc bulge and facet arthropathy. C7-T1: No significant central canal or neural foraminal stenosis. The visualized prevertebral and posterior paraspinous soft tissues are grossly unremarkable.     1.  No acute abnormality or abnormal enhancement in the cervical spine. If there is strong clinical suspicion for discitis/osteomyelitis or epidural infectious process, short interval repeat imaging is recommended. 2.  Multilevel degenerative changes of the cervical spine as described above.    Mr-lumbar Spine-with & W/o    Result Date: 2/8/2018 2/7/2018 7:03 PM HISTORY/REASON FOR EXAM:  Atraumatic back pain. TECHNIQUE/EXAM DESCRIPTION: MRI of the lumbar spine without and with contrast. The study was performed on a [employer] Signa 1.5 Aubrie MRI scanner. T1 sagittal, T2 fast spin-echo sagittal, and T2 axial images were obtained of the lumbar spine. T1 postcontrast fat-suppressed sagittal images were obtained. 15 mL Omniscan contrast was administered intravenously. COMPARISON:  None. FINDINGS: Marrow signal intensity is normal. There is no abnormal enhancement. Vertebral  body heights are preserved. Vertebral alignment is normal. There is mild intervertebral disc space narrowing at L5-S1 with decrease of the normal T2 signal intensity within the disc space consistent with mild degenerative disc desiccation. The spinal cord demonstrates normal course and caliber. There is no abnormal cord enhancement. Conus terminates at the T12/L1 level. Level-specific findings as follows: L5-S1: There is mild broad posterior disc bulge. There is mild facet arthropathy. There is mild bilateral neural foraminal stenosis. L4-5: There is mild diffuse disc bulge. There is minimal bilateral neural foraminal stenosis. L3-4: No significant central canal or neural foraminal stenosis. L2-3: No significant central canal or neural foraminal stenosis. L1-2: No significant central canal or neural foraminal stenosis. The visualized prevertebral and posterior paraspinous soft tissues are grossly unremarkable.     1.  No acute abnormality or abnormal enhancement in the lumbar spine. If there is strong clinical suspicion for discitis/osteomyelitis or epidural infectious process, short interval repeat imaging is recommended. 2.  Multilevel degenerative changes of the lumbar spine as described above.    Mr-shoulder-w/o Right    Addendum Date: 2/8/2018    I further discussed this case with Dr. Badillo at 4:20 PM on 2/8/2018. The patient is exquisitely tender over the medial clavicle. That area is not included on this study. There is however likely some edema and induration of the soft tissues surrounding the mid and medial clavicle at the edge of the images. Review of CT scan of the neck and chest from the same date demonstrates induration of the soft tissues surrounding the right medial clavicle as well as centered in the area of the sternoclavicular joint. These findings would be suspicious for septic arthritis/osteomyelitis of the right sternoclavicular joint.    Result Date: 2/8/2018 2/7/2018 7:03 PM HISTORY/REASON  FOR EXAM: Right-sided shoulder pain TECHNIQUE/EXAM DESCRIPTION: MRI of the RIGHT shoulder without contrast. Using a [employer] Signa 1.5 Aubrie MRI scanner, T1 sagittal, fast spin-echo T2 fat-suppressed oblique coronal, sagittal, and axial and intermediate fast spin-echo oblique coronal images were obtained. COMPARISON: None. FINDINGS: Osseous acromial outlet: The acromion demonstrates a curved undersurface. There is no lateral downsloping. There is no evidence of an inferiorly directed subacromial enthesophyte. There is mild degenerative change of the acromioclavicular joint. There are no inferiorly directed osteophytes. There is minimal fluid seen within the subacromial/subdeltoid bursa. Rotator cuff: There is tendinopathy involving the supraspinatus tendon with mild fraying of the bursal surface fibers. No evidence of full-thickness tear or retraction. Glenoid labrum: There is no definite superior labral tear or evidence of disruption of the biceps anchor. There is a multiloculated fluid signal focus seen anterior to the subscapularis tendon which measures 2.4 x 1.4 x 1.1 cm in size. This extends towards the anterior/superior labrum. The anterior/inferior and posterior/inferior glenoid labrum are intact. Osseous structures/Cartilaginous surfaces: No evidence of marrow edema.  Cartilaginous surfaces are well maintained. Miscellaneous: No significant joint effusion. The long head of the biceps tendon is appropriately situated within the bicipital groove.     1.  Mild tendinopathy of the supraspinatus tendon with very mild fraying of the bursal surface fibers. No evidence of full-thickness tear or retraction. 2.  No definite tear of the glenoid labrum. There is however a multiloculated fluid signal focus present anterior to the subscapularis tendon which extends towards the anterior/superior labrum. This may represent a ganglion cyst but can also potential he  represent a paralabral cyst related to nonvisualized superior  labral tear. 3.  Mild degenerative change of the acromioclavicular joint.    Mr-thoracic Spine-with & W/o    Result Date: 2/8/2018 2/7/2018 7:03 PM HISTORY/REASON FOR EXAM: Atraumatic back and neck pain. TECHNIQUE/EXAM DESCRIPTION: MRI of the thoracic spine without and with contrast. The study was performed on a [employer] Signa 1.5 Aubrie MRI scanner. T1 sagittal, T1 postcontrast fat-suppressed sagittal, T2 sagittal, and T2 axial images were obtained of the thoracic spine. 15 mL Omniscan contrast were administered intravenously. COMPARISON:  None FINDINGS: The thoracic vertebral bodies have a normal height and alignment. The thoracic disks have a normal appearance and signal intensity. The thoracic cord has a normal caliber, course and signal intensity. There is no evidence of abnormal enhancement in the thoracic cord or spine. There is no evidence of disk extrusion, cord compression, central canal stenosis, or neural foraminal stenosis. There is atelectasis and/or consolidation in the visualized portion of the right lung base.     1.  No acute abnormality or abnormal enhancement in the thoracic spine. If there is strong clinical suspicion for discitis/osteomyelitis or epidural infectious process, short interval repeat imaging is recommended. 2.  Possible right lung base pneumonitis.    Echocardiogram Comp W/o Cont    Result Date: 2/9/2018  Transthoracic Echo Report Echocardiography Laboratory CONCLUSIONS No prior study is available for comparison. Normal left ventricular chamber size. Left ventricular ejection fraction is visually estimated to be 60%. No significant valve abnormalities. Estimated right ventricular systolic pressure is 45 mmHg. auto-interpretation LV EF:  60    % FINDINGS Left Ventricle Normal left ventricular chamber size. Normal left ventricular wall thickness. Normal left ventricular systolic function. Left ventricular ejection fraction is visually estimated to be 60%. Normal regional wall motion.  Normal diastolic function. Right Ventricle The right ventricle was normal in size and function. Right Atrium The right atrium is normal in size. Normal inferior vena cava size and inspiratory collapse. Left Atrium The left atrium is normal in size. Left atrial volume index is 21 mL/sq m. Mitral Valve Structurally normal mitral valve without significant stenosis or regurgitation. Aortic Valve Structurally normal aortic valve without significant stenosis or regurgitation. Tricuspid Valve Structurally normal tricuspid valve. No tricuspid stenosis. Trace tricuspid regurgitation. Estimated right ventricular systolic pressure is 45 mmHg. Pulmonic Valve Structurally normal pulmonic valve without significant stenosis or regurgitation. Pericardium Normal pericardium without effusion. Aorta The aortic root is normal. Ascending aorta diameter is 2.9 cm. Vinnie Naqvi MD (Electronically Signed) Final Date:     09 February 2018                 14:50    Ct-cta Chest Pulmonary Artery W/ Recons    Result Date: 2/7/2018 2/7/2018 5:01 PM HISTORY/REASON FOR EXAM:  Chest pain. TECHNIQUE/EXAM DESCRIPTION: CT angiogram scan for pulmonary embolism with contrast, with reconstructions. 1.25 mm helical sections were obtained from the lung apices through the lung bases following the rapid bolus administration of 80 mL of Omnipaque 350 nonionic contrast. Thin-section overlapping reconstruction interval was utilized. Coronal reconstructions were generated from the axial data. MIP post processing was performed and utilized for the interpretation. Low dose optimization technique was utilized for this CT exam including automated exposure control and adjustment of the mA and/or kV according to patient size. COMPARISON: None FINDINGS: Pulmonary Embolism: No. Main Pulmonary Arteries: No. Segmental branches: No. Subsegmental branches: No. Additional Comments: The heart is normal in size and configuration. Lungs: There is minimal atelectasis or  pneumonia in the posterior aspect of the right lower lobe and minimally in the anterior inferior aspect of the lingula. The remainder of the lung parenchyma is clear. Pleura: No pleural effusion. Nodes: No enlarged lymph nodes. Additional findings: There is mild enlargement of both adrenal glands. No hydronephrosis is visualized     1.  No evidence of pulmonary embolism. 2.  Minimal atelectasis or pneumonia in the right lower lobe and in the lingula. 3.  Mild bilateral adrenal gland enlargement. Significance unknown.       Micro:  Results     Procedure Component Value Units Date/Time    CULTURE WOUND W/ GRAM STAIN [[id number]]  (Abnormal)  (Susceptibility) Collected:  02/10/18 1235    Order Status:  Completed Specimen:  Wound Updated:  02/13/18 1123     Significant Indicator POS (POS)     Source WND     Site Neck Abscess     Culture Result Wound -- (A)     Gram Stain Result --     Few WBCs.  Rare Gram positive cocci.       Culture Result Wound -- (A)     Staphylococcus aureus  Light growth      Culture & Susceptibility     STAPHYLOCOCCUS AUREUS     Antibiotic Sensitivity Microscan Unit Status    Ampicillin/sulbactam Sensitive <=8/4 mcg/mL Final    Clindamycin Sensitive <=0.5 mcg/mL Final    Daptomycin Sensitive 1 mcg/mL Final    Erythromycin Sensitive <=0.5 mcg/mL Final    Moxifloxacin Sensitive <=0.5 mcg/mL Final    Oxacillin Sensitive <=0.25 mcg/mL Final    Tetracycline Sensitive <=4 mcg/mL Final    Trimeth/Sulfa Sensitive <=0.5/9.5 mcg/mL Final    Vancomycin Sensitive 2 mcg/mL Final                       ANAEROBIC CULTURE [[id number]] Collected:  02/10/18 1235    Order Status:  Completed Specimen:  Wound Updated:  02/13/18 1123     Significant Indicator NEG     Source WND     Site Neck Abscess     Anaerobic Culture, Culture Res No Anaerobes isolated.    BLOOD CULTURE [[id number]]  (Abnormal) Collected:  02/09/18 1641    Order Status:  Completed Specimen:  Blood from Peripheral Updated:  02/13/18 0951     Significant  "Indicator POS (POS)     Source BLD     Site PERIPHERAL     Blood Culture -- (A)     Growth detected by Bactec instrument.  02/11/2018  13:33  Staphylococcus aureus (methicillin sensitive)  detected by PCR.       Blood Culture -- (A)     Staphylococcus aureus  See previous culture for sensitivity report.      Narrative:       CALL  [facility]  161 tel. [phone],  CALLED  161 tel. [phone] 02/11/2018, 13:33, RB PERF. RESULTS CALLED  TO:[id number],RN;America escobar  Per Hospital Policy: Only change Specimen Src: to \"Line\" if  specified by physician order.    BLOOD CULTURE [[id number]] Collected:  02/12/18 1644    Order Status:  Completed Specimen:  Blood from Peripheral Updated:  02/13/18 0910     Significant Indicator NEG     Source BLD     Site PERIPHERAL     Blood Culture --     No Growth    Note: Blood cultures are incubated for 5 days and  are monitored continuously.Positive blood cultures  are called to the RN and reported as soon as  they are identified.      Narrative:       Per Hospital Policy: Only change Specimen Src: to \"Line\" if  specified by physician order.    BLOOD CULTURE [[id number]] Collected:  02/12/18 1507    Order Status:  Completed Specimen:  Blood from Peripheral Updated:  02/13/18 0910     Significant Indicator NEG     Source BLD     Site PERIPHERAL     Blood Culture --     No Growth    Note: Blood cultures are incubated for 5 days and  are monitored continuously.Positive blood cultures  are called to the RN and reported as soon as  they are identified.      Narrative:       Per Hospital Policy: Only change Specimen Src: to \"Line\" if  specified by physician order.    BLOOD CULTURE [[id number]] Collected:  02/13/18 0142    Order Status:  Completed Specimen:  Blood from Peripheral Updated:  02/13/18 0148    Narrative:       Per Hospital Policy: Only change Specimen Src: to \"Line\" if  specified by physician order.    BLOOD CULTURE [[id number]] Collected:  02/13/18 0142    Order Status:  Completed Specimen:  " "Blood from Peripheral Updated:  02/13/18 0148    Narrative:       Per Hospital Policy: Only change Specimen Src: to \"Line\" if  specified by physician order.    GRAM STAIN [[id number]] Collected:  02/10/18 1235    Order Status:  Completed Specimen:  Wound Updated:  02/11/18 0851     Significant Indicator .     Source WND     Site Neck Abscess     Gram Stain Result --     Few WBCs.  Rare Gram positive cocci.      BLOOD CULTURE [[id number]]  (Abnormal)  (Susceptibility) Collected:  02/07/18 1848    Order Status:  Completed Specimen:  Blood from Peripheral Updated:  02/10/18 0842     Significant Indicator POS (POS)     Source BLD     Site PERIPHERAL     Blood Culture -- (A)     Growth detected by Bactec instrument.  02/08/2018  08:43  Staphylococcus aureus (methicillin sensitive)  detected by PCR.       Blood Culture Staphylococcus aureus (A)    Narrative:       CALL  [facility]  171 tel. [phone],  CALLED  171 tel. [phone] 02/08/2018, 08:49, RB PERF. RESULTS CALLED  TO:[phone] and DILAN  2 of 2 blood culture x2  Sites order. Per Hospital Policy:  Only change Specimen Src: to \"Line\" if specified by physician  order.    Culture & Susceptibility     STAPHYLOCOCCUS AUREUS     Antibiotic Sensitivity Microscan Unit Status    Ampicillin/sulbactam Sensitive <=8/4 mcg/mL Final    Clindamycin Sensitive <=0.5 mcg/mL Final    Daptomycin Sensitive <=0.5 mcg/mL Final    Erythromycin Sensitive <=0.5 mcg/mL Final    Moxifloxacin Sensitive <=0.5 mcg/mL Final    Oxacillin Sensitive <=0.25 mcg/mL Final    Tetracycline Sensitive <=4 mcg/mL Final    Trimeth/Sulfa Sensitive <=0.5/9.5 mcg/mL Final    Vancomycin Sensitive 1 mcg/mL Final                       BLOOD CULTURE [[id number]]  (Abnormal) Collected:  02/07/18 1848    Order Status:  Completed Specimen:  Blood from Peripheral Updated:  02/10/18 0842     Significant Indicator POS (POS)     Source BLD     Site PERIPHERAL     Blood Culture Growth detected by Bactec instrument.  02/08/2018  08:50 " "(A)     Blood Culture -- (A)     Staphylococcus aureus  See previous culture for sensitivity report.      Narrative:       CALL  [facility]  171 tel. [phone],  CALLED  171 tel. [phone] 02/08/2018, 08:50, RB PERF. RESULTS CALLED  TO:[id number] and DILAN  1 of 2 for Blood Culture x 2 sites order. Per Hospital  Policy: Only change Specimen Src: to \"Line\" if specified by  physician order.    BLOOD CULTURE [[id number]] Collected:  02/09/18 1641    Order Status:  Completed Specimen:  Blood from Peripheral Updated:  02/10/18 0759     Significant Indicator NEG     Source BLD     Site PERIPHERAL     Blood Culture --     No Growth    Note: Blood cultures are incubated for 5 days and  are monitored continuously.Positive blood cultures  are called to the RN and reported as soon as  they are identified.      Narrative:       Per Hospital Policy: Only change Specimen Src: to \"Line\" if  specified by physician order.    FLUID CULTURE W/GRAM STAIN [[id number]]     Order Status:  Canceled Specimen:  Body Fluid from Other Body Fluid     Urinalysis [[id number]] Collected:  02/08/18 0539    Order Status:  Completed Specimen:  Urine from Urine, Clean Catch Updated:  02/08/18 0557     Color Yellow     Character Clear     Specific Gravity 1.012     Ph 6.0     Glucose Negative mg/dL      Ketones Negative mg/dL      Protein Negative mg/dL      Bilirubin Negative     Urobilinogen, Urine 0.2     Nitrite Negative     Leukocyte Esterase Negative     Occult Blood Negative     Micro Urine Req see below     Comment: Microscopic examination not performed when specimen is clear  and chemically negative for protein, blood, leukocyte esterase  and nitrite.         Narrative:       If not done within the last 24 hours    Blood Culture [[id number]]     Order Status:  Canceled Specimen:  Blood from Peripheral     Blood Culture [[id number]]     Order Status:  Canceled Specimen:  Blood from Peripheral     Culture Respiratory W/ GRM STN [[id number]]     Order Status:  " Canceled Specimen:  Respirate from Sputum     Blood Culture [[id number]] Collected:  02/07/18 0000    Order Status:  Canceled Specimen:  Other from Peripheral     Blood Culture [[id number]] Collected:  02/07/18 0000    Order Status:  Canceled Specimen:  Other from Peripheral     Culture Respiratory W/ GRM STN [[id number]] Collected:  02/07/18 0000    Order Status:  Canceled Specimen:  Sputum from Sputum           Assessment:  Active Hospital Problems    Diagnosis   • *Sepsis (CMS-HCC) [A41.9]   • Hypokalemia [E87.6]   • Lactic acidosis [E87.2]   • Neck pain [M54.2]   • Hyponatremia [E87.1]   • CAP (community acquired pneumonia) [J18.9]       Plan:  MSSA sepsis, additional work  Fever resolved  +leukocytosis  Blood cultures + 2/7 and 2/9  Repeat blood cultures ordered  TTE is negative from 2/9/28- WES done today  MRI T spine neg  MRI shoulder no annika abscess  PICC when blood cxs neg for 48 hours-hopefully tomorrow  Anticipate 4 weeks IV abx from date of neg blood cxs    Right neck abscess  Underwent I&D on 2/10/2018  Cultures +MSSA  Wound care  Abx as above    Pneumonia  MSSA  Abx as above  Monitor closely for complications    Discussed with internal medicine Dr Davalos

## 2018-02-13 NOTE — PROGRESS NOTES
Back on unit post procedure, post op vitals now in process. Tele box in place. Monitor room notified.

## 2018-02-13 NOTE — PROGRESS NOTES
Report received and Pt admitted to [facility] 5 s/p WES. Pt attached to all leads and monitors. VSS with no complaints of pain or nausea. Orders reviewed.

## 2018-02-13 NOTE — PROGRESS NOTES
Received report from Delia LUGO. Assumed pt care. Assessment completed. A&Ox4. Pain 6/10 in right neck. Pt NPO at midnight for WES.   Pt is currently in bed. RN and CNA numbers provided, no further needs at this time. Hourly rounding in progress.

## 2018-02-13 NOTE — PROGRESS NOTES
"Urszula Persaud is a 57 y.o. female patient.  1. Sepsis, due to unspecified organism (CMS-HCC)    2. Neck pain    3. Pneumonia of right lower lobe due to infectious organism (CMS-HCC)      History of HTN      No Known Allergies  Principal Problem:    Sepsis (CMS-HCC)  Active Problems:    Abscess or cellulitis, neck    Atrial fibrillation with RVR (CMS-HCC)    Hypokalemia    Lactic acidosis    Hyponatremia    CAP (community acquired pneumonia)    Blood pressure 136/94, pulse 92, temperature 36.4 °C (97.6 °F), resp. rate 16, height 1.549 m (5' 1\"), weight 70.2 kg (154 lb 12.2 oz), SpO2 98 %, not currently breastfeeding.    Subjective doing well  Objective minimal drainage, sutures in place, less edema  Assessment & Plan   S/P I&D neck will need long term IV antibiotics  Okay to shower    Millie Perez MD  2/13/2018  "

## 2018-02-13 NOTE — DISCHARGE PLANNING
Medical Social Work    Pt discussed during IDT rounds. Pt scheduled for WES tomorrow. Per ID, pt will need at least 4 weeks of IV abx.

## 2018-02-13 NOTE — PROGRESS NOTES
[facility] [place] Internal Medicine Interval Note    Name Urszula Persaud       1960   Age/Sex 57 y.o. female   MRN [id number]   Code Status FULL        Chief complaint/ reason for interval visit (Primary Diagnosis)   57 year old female with hx of HTN came with neck pain we found cellulitis deep neck infection and bacteremia MSSA.     Interval Problem Daily Status Update    - No fever and no events last night  - WES tomorrow.       Review of Systems   Constitutional: Negative for fever.   HENT: Negative.    Eyes: Negative.    Respiratory: Negative.    Cardiovascular: Negative.    Gastrointestinal: Negative.    Genitourinary: Negative.    Musculoskeletal: Positive for neck pain.   Skin: Negative for rash.   Neurological: Negative for weakness.       Consultants/Specialty  Ortho/ENT    Disposition  Home    Quality Measures  Quality-Core Measures   Reviewed items::  Labs reviewed, EKG reviewed, Medications reviewed and Radiology images reviewed  Soliz catheter::  No Soliz  DVT prophylaxis pharmacological::  Heparin          Physical Exam       Vitals:    18 0352 18 0755 18 1152 18 1603   BP: 156/100 154/97 145/95 154/101   Pulse: (!) 109 (!) 103 (!) 101 (!) 104   Resp:    Temp: 37.2 °C (98.9 °F) 37.5 °C (99.5 °F) 37.2 °C (99 °F) 37.3 °C (99.1 °F)   SpO2: 90% 91% 92% 93%   Weight:       Height:         Body mass index is 29.41 kg/m².    Oxygen Therapy:  Pulse Oximetry: 93 %, O2 (LPM): 0, O2 Delivery: None (Room Air)    Physical Exam   Constitutional: She is oriented to person, place, and time and well-developed, well-nourished, and in no distress. No distress.   HENT:   Head: Normocephalic.   Redness swelling and warm skin on the R side of lower neck.    Neck: No JVD present. No tracheal deviation present. No thyromegaly present.   Cardiovascular: Normal rate.    No murmur heard.  irregular and fast     Pulmonary/Chest: No respiratory distress. She has no wheezes.   Abdominal: Soft. She exhibits no distension. There is no tenderness. There is no rebound.   Neurological: She is alert and oriented to person, place, and time. No cranial nerve deficit. Coordination normal.         Lab Data Review:      2/8/2018  4:07 PM    Recent Labs      02/10/18   0257  02/12/18   0426   SODIUM  132*  130*   POTASSIUM  3.3*  4.0   CHLORIDE  101  96   CO2  21  27   BUN  6*  7*   CREATININE  0.49*  0.41*   MAGNESIUM  1.7   --    CALCIUM  8.4*  8.6       Recent Labs      02/10/18   0257  02/12/18   0426   ALTSGPT  26   --    ASTSGOT  22   --    ALKPHOSPHAT  75   --    TBILIRUBIN  3.6*   --    GLUCOSE  137*  115*       Recent Labs      02/10/18   0257  02/12/18   0425   RBC  3.41*  3.31*   HEMOGLOBIN  11.4*  11.3*   HEMATOCRIT  31.5*  30.3*   PLATELETCT  162*  242       Recent Labs      02/10/18   0257  02/12/18   0425   WBC  10.0  11.9*   NEUTSPOLYS  60.30   --    LYMPHOCYTES  13.80*   --    MONOCYTES  3.40   --    EOSINOPHILS  0.90   --    BASOPHILS  0.00   --    ASTSGOT  22   --    ALTSGPT  26   --    ALKPHOSPHAT  75   --    TBILIRUBIN  3.6*   --            Assessment/Plan     * Sepsis (CMS-HCC)- (present on admission)   Assessment & Plan    This was sepsis (without associated acute organ dysfunction).   Hx of tooth infection in last 3 months  MRI of her cervical, thoracic, lumbar spine with and without contrast apparently did  NOT show any evidence of epidural abscesses  Resolved lactic acid   Likely 2/2 cellulitis/deep neck infection    Blood culture: MSSA on 2/7  Repeat blood culture on 2/9: MSSA  Day 5 cefazolin (started on 2/8/18)  Drainage on 2/10  Echo on 2/9 no signs of endocarditis;  WES:  Tomorrow               Atrial fibrillation with RVR (CMS-HCC)   Assessment & Plan    New Dx  developed Afib with RVR on 2/10 up to 170s  Metoprolol did not work  Converted after an hour on amiodarone drip, cards following, switched to  oral amio  TSH normal        Abscess or cellulitis, neck- (present on admission)   Assessment & Plan    With phlegmon, s/p I&D by Dr. Perez yesterday, still with drain in, not draining much  Continue IV antibiotics          CAP (community acquired pneumonia)- (present on admission)   Assessment & Plan    -vs atelectasis  -wbc normal, doing fine  -s/p abx        Hyponatremia- (present on admission)   Assessment & Plan    -mild        Lactic acidosis- (present on admission)   Assessment & Plan    2/2 sepsis; resolved        Hypokalemia- (present on admission)   Assessment & Plan    -replacing  -check am labs

## 2018-02-13 NOTE — PROGRESS NOTES
Called report to Alexsandra LUGO all questions and concerns answered. Pt. Placed on transport to transport back to room.

## 2018-02-14 ENCOUNTER — APPOINTMENT (OUTPATIENT)
Dept: RADIOLOGY | Facility: MEDICAL CENTER | Age: 58
DRG: 853 | End: 2018-02-14
Attending: INTERNAL MEDICINE
Payer: COMMERCIAL

## 2018-02-14 LAB
ANION GAP SERPL CALC-SCNC: 9 MMOL/L (ref 0–11.9)
BACTERIA BLD CULT: NORMAL
BUN SERPL-MCNC: 8 MG/DL (ref 8–22)
CALCIUM SERPL-MCNC: 8.5 MG/DL (ref 8.5–10.5)
CHLORIDE SERPL-SCNC: 98 MMOL/L (ref 96–112)
CO2 SERPL-SCNC: 27 MMOL/L (ref 20–33)
CREAT SERPL-MCNC: 0.43 MG/DL (ref 0.5–1.4)
ERYTHROCYTE [DISTWIDTH] IN BLOOD BY AUTOMATED COUNT: 47.5 FL (ref 35.9–50)
GLUCOSE SERPL-MCNC: 119 MG/DL (ref 65–99)
HCT VFR BLD AUTO: 32.2 % (ref 37–47)
HGB BLD-MCNC: 11.7 G/DL (ref 12–16)
MCH RBC QN AUTO: 33.6 PG (ref 27–33)
MCHC RBC AUTO-ENTMCNC: 36.3 G/DL (ref 33.6–35)
MCV RBC AUTO: 92.5 FL (ref 81.4–97.8)
PLATELET # BLD AUTO: 345 K/UL (ref 164–446)
PMV BLD AUTO: 9.5 FL (ref 9–12.9)
POTASSIUM SERPL-SCNC: 3.9 MMOL/L (ref 3.6–5.5)
RBC # BLD AUTO: 3.48 M/UL (ref 4.2–5.4)
SIGNIFICANT IND 70042: NORMAL
SITE SITE: NORMAL
SODIUM SERPL-SCNC: 134 MMOL/L (ref 135–145)
SOURCE SOURCE: NORMAL
WBC # BLD AUTO: 13 K/UL (ref 4.8–10.8)

## 2018-02-14 PROCEDURE — 700102 HCHG RX REV CODE 250 W/ 637 OVERRIDE(OP): Performed by: HOSPITALIST

## 2018-02-14 PROCEDURE — 700111 HCHG RX REV CODE 636 W/ 250 OVERRIDE (IP): Performed by: INTERNAL MEDICINE

## 2018-02-14 PROCEDURE — A9270 NON-COVERED ITEM OR SERVICE: HCPCS | Performed by: INTERNAL MEDICINE

## 2018-02-14 PROCEDURE — 36415 COLL VENOUS BLD VENIPUNCTURE: CPT

## 2018-02-14 PROCEDURE — 85027 COMPLETE CBC AUTOMATED: CPT

## 2018-02-14 PROCEDURE — 99232 SBSQ HOSP IP/OBS MODERATE 35: CPT | Performed by: INTERNAL MEDICINE

## 2018-02-14 PROCEDURE — A9270 NON-COVERED ITEM OR SERVICE: HCPCS | Performed by: HOSPITALIST

## 2018-02-14 PROCEDURE — 770020 HCHG ROOM/CARE - TELE (206)

## 2018-02-14 PROCEDURE — 80048 BASIC METABOLIC PNL TOTAL CA: CPT

## 2018-02-14 PROCEDURE — 700102 HCHG RX REV CODE 250 W/ 637 OVERRIDE(OP): Performed by: INTERNAL MEDICINE

## 2018-02-14 PROCEDURE — 73030 X-RAY EXAM OF SHOULDER: CPT | Mod: RT

## 2018-02-14 PROCEDURE — 700111 HCHG RX REV CODE 636 W/ 250 OVERRIDE (IP): Performed by: HOSPITALIST

## 2018-02-14 RX ADMIN — HEPARIN SODIUM 5000 UNITS: 5000 INJECTION, SOLUTION INTRAVENOUS; SUBCUTANEOUS at 06:10

## 2018-02-14 RX ADMIN — STANDARDIZED SENNA CONCENTRATE AND DOCUSATE SODIUM 2 TABLET: 8.6; 5 TABLET, FILM COATED ORAL at 08:32

## 2018-02-14 RX ADMIN — CEFAZOLIN SODIUM 2 G: 2 INJECTION, SOLUTION INTRAVENOUS at 06:10

## 2018-02-14 RX ADMIN — AMIODARONE HYDROCHLORIDE 200 MG: 200 TABLET ORAL at 20:43

## 2018-02-14 RX ADMIN — OXYCODONE HYDROCHLORIDE 5 MG: 5 TABLET ORAL at 00:28

## 2018-02-14 RX ADMIN — CEFAZOLIN SODIUM 2 G: 2 INJECTION, SOLUTION INTRAVENOUS at 14:12

## 2018-02-14 RX ADMIN — METOPROLOL TARTRATE 25 MG: 25 TABLET, FILM COATED ORAL at 08:31

## 2018-02-14 RX ADMIN — AMIODARONE HYDROCHLORIDE 200 MG: 200 TABLET ORAL at 08:31

## 2018-02-14 RX ADMIN — STANDARDIZED SENNA CONCENTRATE AND DOCUSATE SODIUM 2 TABLET: 8.6; 5 TABLET, FILM COATED ORAL at 20:44

## 2018-02-14 RX ADMIN — CEFAZOLIN SODIUM 2 G: 2 INJECTION, SOLUTION INTRAVENOUS at 20:43

## 2018-02-14 RX ADMIN — HEPARIN SODIUM 5000 UNITS: 5000 INJECTION, SOLUTION INTRAVENOUS; SUBCUTANEOUS at 14:12

## 2018-02-14 RX ADMIN — METOPROLOL TARTRATE 25 MG: 25 TABLET, FILM COATED ORAL at 20:43

## 2018-02-14 RX ADMIN — HEPARIN SODIUM 5000 UNITS: 5000 INJECTION, SOLUTION INTRAVENOUS; SUBCUTANEOUS at 20:43

## 2018-02-14 RX ADMIN — OXYCODONE HYDROCHLORIDE 5 MG: 5 TABLET ORAL at 08:26

## 2018-02-14 RX ADMIN — OXYCODONE HYDROCHLORIDE 5 MG: 5 TABLET ORAL at 14:10

## 2018-02-14 ASSESSMENT — ENCOUNTER SYMPTOMS
CHILLS: 0
BACK PAIN: 1
VOMITING: 0
MYALGIAS: 0
NAUSEA: 0
DIZZINESS: 0
COUGH: 0
SPEECH CHANGE: 0
SHORTNESS OF BREATH: 0
PALPITATIONS: 0
ABDOMINAL PAIN: 0
FEVER: 0
HEADACHES: 0
MYALGIAS: 1
NECK PAIN: 1

## 2018-02-14 ASSESSMENT — PAIN SCALES - GENERAL
PAINLEVEL_OUTOF10: 6
PAINLEVEL_OUTOF10: 4
PAINLEVEL_OUTOF10: 3
PAINLEVEL_OUTOF10: 4
PAINLEVEL_OUTOF10: 6
PAINLEVEL_OUTOF10: 6

## 2018-02-14 NOTE — DISCHARGE PLANNING
Medical Social Work    Pt discussed during IDT rounds. Pt pending PICC placement. Per ID note PICC is ordered. Faxed [facility] order to [facility] to schedule infusion for Friday 2/16/18.

## 2018-02-14 NOTE — DISCHARGE PLANNING
Care Transition Team Assessment    SW met with pt at bedside. Pt has transportation to daily infusion appointments, but would prefer infusions be done closer to her home. SW unsure if [facility] is able to do this- per [facility], they must be done at infusion center. Pt states she will still be working for duration of IV abx (4 wks), so she prefers her infusion appts to be early in the day. Pt does not use any DME/O2 at home. Pt has dtr for support. WES has been done- pending negative blood cultures for PICC placement.     Information Source  Orientation : Oriented x 4  Information Given By: Patient  Who is responsible for making decisions for patient? : Patient    Readmission Evaluation  Is this a readmission?: No    Elopement Risk  Legal Hold: No  Ambulatory or Self Mobile in Wheelchair: Yes  Disoriented: No  Psychiatric Symptoms: None  History of Wandering: No  Elopement this Admit: No  Vocalizing Wanting to Leave: No  Displays Behaviors, Body Language Wanting to Leave: No-Not at Risk for Elopement  Elopement Risk: Not at Risk for Elopement    Interdisciplinary Discharge Planning  Does Admitting Nurse Feel This Could be a Complex Discharge?: No  Primary Care Physician:   Lives with - Patient's Self Care Capacity: Adult Children  Patient or legal guardian wants to designate a caregiver (see row info): No  Support Systems: Children, Family Member(s)  Housing / Facility: 1 Story Apartment / Condo  Do You Take your Prescribed Medications Regularly: Yes  Able to Return to Previous ADL's: Yes  Mobility Issues: No  Prior Services: None  Patient Expects to be Discharged to:: Home  Assistance Needed: No  Durable Medical Equipment: Not Applicable    Discharge Preparedness  What is your plan after discharge?: Home with help  What are your discharge supports?: Child  Prior Functional Level: Ambulatory, Independent with Activities of Daily Living, Independent with Medication Management  Difficulity with ADLs:  None  Difficulity with IADLs: None    Functional Assesment  Prior Functional Level: Ambulatory, Independent with Activities of Daily Living, Independent with Medication Management    Finances  Financial Barriers to Discharge: No  Prescription Coverage: Yes    Vision / Hearing Impairment  Vision Impairment : Yes  Right Eye Vision: Impaired, Wears Glasses  Left Eye Vision: Impaired, Wears Glasses  Hearing Impairment : No    Values / Beliefs / Concerns  Values / Beliefs Concerns : No    Advance Directive  Advance Directive?: None    Domestic Abuse  Have you ever been the victim of abuse or violence?: No  Physical Abuse or Sexual Abuse: No  Verbal Abuse or Emotional Abuse: No  Possible Abuse Reported to:: Not Applicable    Psychological Assessment  History of Substance Abuse: None  History of Psychiatric Problems: No  Non-compliant with Treatment: No  Newly Diagnosed Illness: Yes    Discharge Risks or Barriers  Discharge risks or barriers?: No    Anticipated Discharge Information  Anticipated discharge disposition: Home, Infusion Center (outpatient)

## 2018-02-14 NOTE — PROGRESS NOTES
"Urszula Persaud is a 57 y.o. female patient.  1. Sepsis, due to unspecified organism (CMS-HCC)    2. Neck pain    3. Pneumonia of right lower lobe due to infectious organism (CMS-HCC)      History reviewed. No pertinent past medical history.      No Known Allergies  Principal Problem:    Sepsis (CMS-HCC)  Active Problems:    Abscess or cellulitis, neck    Atrial fibrillation with RVR (CMS-HCC)    Hypokalemia    Lactic acidosis    Hyponatremia    CAP (community acquired pneumonia)    Blood pressure 133/85, pulse 84, temperature 37 °C (98.6 °F), resp. rate 16, height 1.549 m (5' 1\"), weight 68.4 kg (150 lb 12.7 oz), SpO2 93 %, not currently breastfeeding.    Subjective doing well  Objective  Neck looks good with no drainage, decreased edema, no erythema and decreased pain  Assessment & Plan  S/P I&D neck, sutures out later this week  IV per ID    Millie Perez MD  2/14/2018  "

## 2018-02-14 NOTE — CARE PLAN
Problem: Knowledge Deficit  Goal: Knowledge of disease process/condition, treatment plan, diagnostic tests, and medications will improve  Outcome: PROGRESSING AS EXPECTED  Patient educated about POC.  All questions answered in regards to disease process, treatment, and diet.  Patient verbalized understanding and voiced no further questions at this time.    Problem: Pain Management  Goal: Pain level will decrease to patient's comfort goal  Outcome: PROGRESSING AS EXPECTED  Educated patient about 1-10 pain scale, regular pain assessments, and available pharmaciological and non-pharmacological pain relief.  Provided PRN pain relief as ordered and utilized repositioning. Patient expressed understanding and had no further questions at this time.

## 2018-02-14 NOTE — PROGRESS NOTES
Pt. Sleeping comfortably in bed. No signs or symptoms of distress or discomfort. Safety maintained. Call light within reach.

## 2018-02-14 NOTE — PROGRESS NOTES
Assumed care of pt. Bedside report received from day R.N. Pt just returned from walking hallway with daughter. Pt experiencing soreness as expected. VSS. Pt resting comfortably. All needs met. Bed low and locked, call light in reach. Discussed POC.

## 2018-02-14 NOTE — PROGRESS NOTES
Bedside report received, pt care assumed, tele box on.  Pt denies any additional needs at this time. Bed in lowest position,  pt educated on fall risk and verbalized understanding, call light within reach.

## 2018-02-14 NOTE — DISCHARGE PLANNING
[facility] the request of the SW [facility] on 02/16/2018 at 1700. SW on floor has been notified.

## 2018-02-14 NOTE — PROGRESS NOTES
[facility]    Date of Service: 2018    Chief Complaint  57 y.o. female admitted 2018 with neck pain; found cellulitis and deep neck infection with MSSA bacteremia     Interval Problem Update  Pt seen and examined,had WES done today, neg for endocarditis,   ID and ENT following, appreciate rec.     Consultants/Specialty  ENT Dr. Perez  Surgery Dr. Gong  Cards Dr. Grace  ID Dr. Trent    Disposition  TBD         Review of Systems   Constitutional: Negative for chills and fever.   Respiratory: Negative for cough and shortness of breath.    Cardiovascular: Negative for chest pain and palpitations.   Gastrointestinal: Negative for abdominal pain, nausea and vomiting.   Genitourinary: Negative for dysuria.   Musculoskeletal: Positive for back pain and neck pain. Negative for myalgias.        Significant pain at surgical site   Neurological: Negative for dizziness and headaches.      Physical Exam  Laboratory/Imaging   Hemodynamics  Temp (24hrs), Av.7 °C (98 °F), Min:36.1 °C (97 °F), Max:37.1 °C (98.7 °F)   Temperature: 36.7 °C (98.1 °F)  Pulse  Av.8  Min: 70  Max: 180   Blood Pressure: 125/73, NIBP: 129/87      Respiratory      Respiration: 18, Pulse Oximetry: 96 %        RUL Breath Sounds: Clear, RML Breath Sounds: Clear, RLL Breath Sounds: Diminished, STEFFEN Breath Sounds: Clear, LLL Breath Sounds: Diminished    Fluids    Intake/Output Summary (Last 24 hours) at 18 1638  Last data filed at 18 1300   Gross per 24 hour   Intake              810 ml   Output                0 ml   Net              810 ml       Nutrition  Orders Placed This Encounter   Procedures   • DIET ORDER     Standing Status:   Standing     Number of Occurrences:   1     Order Specific Question:   Diet:     Answer:   Regular [1]     Physical Exam   Constitutional: She is oriented to person, place, and time. She appears well-developed and well-nourished. Distressed: in pain.   HENT:   Head:  Normocephalic and atraumatic.   Cardiovascular: Regular rhythm and normal heart sounds.    No murmur heard.  Pulmonary/Chest: Effort normal and breath sounds normal. No respiratory distress. She has no wheezes. She has no rales.   Abdominal: Soft. Bowel sounds are normal. She exhibits no distension. There is no tenderness.   Musculoskeletal: Normal range of motion. She exhibits no edema.   Neurological: She is alert and oriented to person, place, and time.   Skin: Skin is warm and dry. No erythema.   Penrose drain has been removed     Nursing note and vitals reviewed.      Recent Labs      02/12/18   0425  02/13/18   0142   WBC  11.9*  11.4*   RBC  3.31*  3.61*   HEMOGLOBIN  11.3*  12.1   HEMATOCRIT  30.3*  32.9*   MCV  91.5  91.1   MCH  34.1*  33.5*   MCHC  37.3*  36.8*   RDW  46.4  45.8   PLATELETCT  242  290   MPV  9.7  9.3     Recent Labs      02/12/18   0426  02/13/18   0143   SODIUM  130*  135   POTASSIUM  4.0  3.8   CHLORIDE  96  98   CO2  27  27   GLUCOSE  115*  126*   BUN  7*  11   CREATININE  0.41*  0.42*   CALCIUM  8.6  8.9                      Assessment/Plan     * Sepsis (CMS-HCC)- (present on admission)   Assessment & Plan    This was sepsis (without associated acute organ dysfunction).   Hx of tooth infection in last 3 months  MRI of her cervical, thoracic, lumbar spine with and without contrast apparently did  NOT show any evidence of epidural abscesses  Resolved lactic acid   Likely 2/2 cellulitis/deep neck infection    Blood culture: MSSA on 2/7  Repeat blood culture on 2/9: MSSA  Day 5 cefazolin (started on 2/8/18)  Drainage on 2/10  Echo on 2/9 no signs of endocarditis;  WES:  Tdone on 2/13/18 neg for endocarditis               Atrial fibrillation with RVR (CMS-HCC)   Assessment & Plan    New Dx  developed Afib with RVR on 2/10 up to 170s  Metoprolol did not work  Converted after an hour on amiodarone drip, cards following, switched to oral amio  TSH normal        Abscess or cellulitis, neck-  (present on admission)   Assessment & Plan    With phlegmon, s/p I&D by Dr. Perez yesterday, still with drain in, not draining much  Continue IV antibiotics          CAP (community acquired pneumonia)- (present on admission)   Assessment & Plan    -vs atelectasis  -wbc normal, doing fine  -s/p abx        Hyponatremia- (present on admission)   Assessment & Plan    -mild        Lactic acidosis- (present on admission)   Assessment & Plan    2/2 sepsis; resolved        Hypokalemia- (present on admission)   Assessment & Plan    -replacing  -check am labs          Quality-Core Measures   Reviewed items::  Labs reviewed and Medications reviewed  Soliz catheter::  No Soliz  DVT prophylaxis pharmacological::  Heparin  Antibiotics:  Treating active infection/contamination beyond 24 hours perioperative coverage

## 2018-02-14 NOTE — CARE PLAN
Problem: Safety  Goal: Will remain free from falls  Outcome: PROGRESSING AS EXPECTED  Patient educated on patient safety and fall precautions. Patient verbalized and demonstrated understanding of education. Patient will use call light for assistance. Pt gait is steady with SB assist.    Problem: Infection  Goal: Will remain free from infection  Outcome: PROGRESSING AS EXPECTED   Implement standard precautions and perform hand washing before and after patient contact. RN will follow protocols and necessary steps to minimize the spread of infection. RN educated pt and and any visitors on proper hand hygiene. Pt is on IV abx

## 2018-02-15 ENCOUNTER — APPOINTMENT (OUTPATIENT)
Dept: RADIOLOGY | Facility: MEDICAL CENTER | Age: 58
DRG: 853 | End: 2018-02-15
Attending: INTERNAL MEDICINE
Payer: COMMERCIAL

## 2018-02-15 PROBLEM — M25.511 RIGHT SHOULDER PAIN: Status: ACTIVE | Noted: 2018-02-15

## 2018-02-15 LAB
ANION GAP SERPL CALC-SCNC: 8 MMOL/L (ref 0–11.9)
BUN SERPL-MCNC: 7 MG/DL (ref 8–22)
CALCIUM SERPL-MCNC: 8.9 MG/DL (ref 8.5–10.5)
CHLORIDE SERPL-SCNC: 100 MMOL/L (ref 96–112)
CO2 SERPL-SCNC: 26 MMOL/L (ref 20–33)
CREAT SERPL-MCNC: 0.46 MG/DL (ref 0.5–1.4)
ERYTHROCYTE [DISTWIDTH] IN BLOOD BY AUTOMATED COUNT: 47.7 FL (ref 35.9–50)
GLUCOSE SERPL-MCNC: 112 MG/DL (ref 65–99)
HCT VFR BLD AUTO: 32.4 % (ref 37–47)
HGB BLD-MCNC: 11.8 G/DL (ref 12–16)
MCH RBC QN AUTO: 33.7 PG (ref 27–33)
MCHC RBC AUTO-ENTMCNC: 36.4 G/DL (ref 33.6–35)
MCV RBC AUTO: 92.6 FL (ref 81.4–97.8)
PLATELET # BLD AUTO: 425 K/UL (ref 164–446)
PMV BLD AUTO: 9.3 FL (ref 9–12.9)
POTASSIUM SERPL-SCNC: 3.8 MMOL/L (ref 3.6–5.5)
RBC # BLD AUTO: 3.5 M/UL (ref 4.2–5.4)
SODIUM SERPL-SCNC: 134 MMOL/L (ref 135–145)
WBC # BLD AUTO: 14.1 K/UL (ref 4.8–10.8)

## 2018-02-15 PROCEDURE — A9270 NON-COVERED ITEM OR SERVICE: HCPCS | Performed by: INTERNAL MEDICINE

## 2018-02-15 PROCEDURE — 36415 COLL VENOUS BLD VENIPUNCTURE: CPT

## 2018-02-15 PROCEDURE — B548ZZA ULTRASONOGRAPHY OF SUPERIOR VENA CAVA, GUIDANCE: ICD-10-PCS | Performed by: INTERNAL MEDICINE

## 2018-02-15 PROCEDURE — 700102 HCHG RX REV CODE 250 W/ 637 OVERRIDE(OP): Performed by: HOSPITALIST

## 2018-02-15 PROCEDURE — 770020 HCHG ROOM/CARE - TELE (206)

## 2018-02-15 PROCEDURE — 700105 HCHG RX REV CODE 258

## 2018-02-15 PROCEDURE — 80048 BASIC METABOLIC PNL TOTAL CA: CPT

## 2018-02-15 PROCEDURE — 99232 SBSQ HOSP IP/OBS MODERATE 35: CPT | Performed by: INTERNAL MEDICINE

## 2018-02-15 PROCEDURE — 36569 INSJ PICC 5 YR+ W/O IMAGING: CPT

## 2018-02-15 PROCEDURE — 700102 HCHG RX REV CODE 250 W/ 637 OVERRIDE(OP): Performed by: INTERNAL MEDICINE

## 2018-02-15 PROCEDURE — 700111 HCHG RX REV CODE 636 W/ 250 OVERRIDE (IP): Performed by: HOSPITALIST

## 2018-02-15 PROCEDURE — 85027 COMPLETE CBC AUTOMATED: CPT

## 2018-02-15 PROCEDURE — 02HV33Z INSERTION OF INFUSION DEVICE INTO SUPERIOR VENA CAVA, PERCUTANEOUS APPROACH: ICD-10-PCS | Performed by: INTERNAL MEDICINE

## 2018-02-15 PROCEDURE — A9270 NON-COVERED ITEM OR SERVICE: HCPCS | Performed by: HOSPITALIST

## 2018-02-15 PROCEDURE — 700111 HCHG RX REV CODE 636 W/ 250 OVERRIDE (IP): Performed by: INTERNAL MEDICINE

## 2018-02-15 RX ORDER — SODIUM CHLORIDE 9 MG/ML
INJECTION, SOLUTION INTRAVENOUS
Status: COMPLETED
Start: 2018-02-15 | End: 2018-02-15

## 2018-02-15 RX ADMIN — CEFAZOLIN SODIUM 2 G: 2 INJECTION, SOLUTION INTRAVENOUS at 05:52

## 2018-02-15 RX ADMIN — HEPARIN SODIUM 5000 UNITS: 5000 INJECTION, SOLUTION INTRAVENOUS; SUBCUTANEOUS at 20:48

## 2018-02-15 RX ADMIN — HEPARIN SODIUM 5000 UNITS: 5000 INJECTION, SOLUTION INTRAVENOUS; SUBCUTANEOUS at 05:52

## 2018-02-15 RX ADMIN — HEPARIN SODIUM 5000 UNITS: 5000 INJECTION, SOLUTION INTRAVENOUS; SUBCUTANEOUS at 14:58

## 2018-02-15 RX ADMIN — STANDARDIZED SENNA CONCENTRATE AND DOCUSATE SODIUM 2 TABLET: 8.6; 5 TABLET, FILM COATED ORAL at 09:41

## 2018-02-15 RX ADMIN — AMIODARONE HYDROCHLORIDE 200 MG: 200 TABLET ORAL at 20:48

## 2018-02-15 RX ADMIN — METOPROLOL TARTRATE 25 MG: 25 TABLET, FILM COATED ORAL at 20:48

## 2018-02-15 RX ADMIN — CEFAZOLIN SODIUM 2 G: 2 INJECTION, SOLUTION INTRAVENOUS at 18:08

## 2018-02-15 RX ADMIN — STANDARDIZED SENNA CONCENTRATE AND DOCUSATE SODIUM 2 TABLET: 8.6; 5 TABLET, FILM COATED ORAL at 20:48

## 2018-02-15 RX ADMIN — SODIUM CHLORIDE 250 ML: 9 INJECTION, SOLUTION INTRAVENOUS at 05:53

## 2018-02-15 RX ADMIN — AMIODARONE HYDROCHLORIDE 200 MG: 200 TABLET ORAL at 09:40

## 2018-02-15 RX ADMIN — METOPROLOL TARTRATE 25 MG: 25 TABLET, FILM COATED ORAL at 09:40

## 2018-02-15 ASSESSMENT — PAIN SCALES - GENERAL
PAINLEVEL_OUTOF10: 4
PAINLEVEL_OUTOF10: 3
PAINLEVEL_OUTOF10: 2
PAINLEVEL_OUTOF10: 3
PAINLEVEL_OUTOF10: 1
PAINLEVEL_OUTOF10: 3
PAINLEVEL_OUTOF10: 4
PAINLEVEL_OUTOF10: 4
PAINLEVEL_OUTOF10: 1

## 2018-02-15 ASSESSMENT — ENCOUNTER SYMPTOMS
COUGH: 0
CHILLS: 0
MYALGIAS: 1
HEADACHES: 0
ABDOMINAL PAIN: 0
PALPITATIONS: 0
NECK PAIN: 1
DIZZINESS: 0
FEVER: 0
VOMITING: 0
SPEECH CHANGE: 0
BACK PAIN: 1
NAUSEA: 0
MYALGIAS: 0
SHORTNESS OF BREATH: 0

## 2018-02-15 NOTE — CARE PLAN
Problem: Communication  Goal: The ability to communicate needs accurately and effectively will improve  Outcome: PROGRESSING AS EXPECTED  Pt uses call light appropriately. Calls for assistance as needed. Asks questions regarding plan of care

## 2018-02-15 NOTE — PROGRESS NOTES
[facility] Hospitalist Progress Note    Date of Service: 2018    Chief Complaint  57 y.o. female admitted 2018 with neck pain; found cellulitis and deep neck infection with MSSA bacteremia     Interval Problem Update  Pt seen and examined, no overnight events, afebrile, no nausea or vomiting. Having right shoulder pain will do an Xray.   ID and ENT following, appreciate rec.     Consultants/Specialty  ENT Dr. Perez  Surgery Dr. Gong  Cards Dr. Grace  ID Dr. Trent    Disposition  TBD         Review of Systems   Constitutional: Negative for chills and fever.   Respiratory: Negative for cough and shortness of breath.    Cardiovascular: Negative for chest pain and palpitations.   Gastrointestinal: Negative for abdominal pain, nausea and vomiting.   Genitourinary: Negative for dysuria.   Musculoskeletal: Positive for back pain and neck pain. Negative for myalgias.        Significant pain at surgical site   Neurological: Negative for dizziness and headaches.      Physical Exam  Laboratory/Imaging   Hemodynamics  Temp (24hrs), Av.9 °C (98.5 °F), Min:36.6 °C (97.9 °F), Max:37.1 °C (98.8 °F)   Temperature: 37 °C (98.6 °F)  Pulse  Av.2  Min: 70  Max: 180   Blood Pressure: 133/85      Respiratory      Respiration: 16, Pulse Oximetry: 93 %     Work Of Breathing / Effort: Mild  RUL Breath Sounds: Expiratory Wheezes, RML Breath Sounds: Expiratory Wheezes, RLL Breath Sounds: Expiratory Wheezes;Crackles, STEFFEN Breath Sounds: Expiratory Wheezes, LLL Breath Sounds: Expiratory Wheezes;Crackles    Fluids    Intake/Output Summary (Last 24 hours) at 18 1613  Last data filed at 18 1500   Gross per 24 hour   Intake             1660 ml   Output              400 ml   Net             1260 ml       Nutrition  Orders Placed This Encounter   Procedures   • DIET ORDER     Standing Status:   Standing     Number of Occurrences:   1     Order Specific Question:   Diet:     Answer:   Regular [1]     Physical Exam    Constitutional: She is oriented to person, place, and time. She appears well-developed and well-nourished. Distressed: in pain.   HENT:   Head: Normocephalic and atraumatic.   Cardiovascular: Regular rhythm and normal heart sounds.    No murmur heard.  Pulmonary/Chest: Effort normal and breath sounds normal. No respiratory distress. She has no wheezes. She has no rales.   Abdominal: Soft. Bowel sounds are normal. She exhibits no distension. There is no tenderness.   Musculoskeletal: Normal range of motion. She exhibits no edema.   Neurological: She is alert and oriented to person, place, and time.   Skin: Skin is warm and dry. No erythema.   Penrose drain has been removed     Nursing note and vitals reviewed.      Recent Labs      02/12/18 0425 02/13/18 0142 02/14/18 0218   WBC  11.9*  11.4*  13.0*   RBC  3.31*  3.61*  3.48*   HEMOGLOBIN  11.3*  12.1  11.7*   HEMATOCRIT  30.3*  32.9*  32.2*   MCV  91.5  91.1  92.5   MCH  34.1*  33.5*  33.6*   MCHC  37.3*  36.8*  36.3*   RDW  46.4  45.8  47.5   PLATELETCT  242  290  345   MPV  9.7  9.3  9.5     Recent Labs      02/12/18 0426 02/13/18 0143  02/14/18 0218   SODIUM  130*  135  134*   POTASSIUM  4.0  3.8  3.9   CHLORIDE  96  98  98   CO2  27  27  27   GLUCOSE  115*  126*  119*   BUN  7*  11  8   CREATININE  0.41*  0.42*  0.43*   CALCIUM  8.6  8.9  8.5                      Assessment/Plan     * Sepsis (CMS-[facility])- (present on admission)   Assessment & Plan    This was sepsis (without associated acute organ dysfunction).   Hx of tooth infection in last 3 months  MRI of her cervical, thoracic, lumbar spine with and without contrast apparently did  NOT show any evidence of epidural abscesses  Resolved lactic acid   Likely 2/2 cellulitis/deep neck infection    Blood culture: MSSA on 2/7  Repeat blood culture on 2/9: MSSA  Day 5 cefazolin (started on 2/8/18)  Drainage on 2/10  Echo on 2/9 no signs of endocarditis;  WES:  Tdone on 2/13/18 neg for endocarditis                Atrial fibrillation with RVR (CMS-HCC)   Assessment & Plan    New Dx  developed Afib with RVR on 2/10 up to 170s  Metoprolol did not work  Converted after an hour on amiodarone drip, cards following, switched to oral amio  TSH normal        Abscess or cellulitis, neck- (present on admission)   Assessment & Plan    With phlegmon, s/p I&D by Dr. Perez yesterday, still with drain in, not draining much  Continue IV antibiotics          CAP (community acquired pneumonia)- (present on admission)   Assessment & Plan    -vs atelectasis  -wbc normal, doing fine  -s/p abx        Hyponatremia- (present on admission)   Assessment & Plan    -mild        Lactic acidosis- (present on admission)   Assessment & Plan    2/2 sepsis; resolved        Hypokalemia- (present on admission)   Assessment & Plan    -replacing  -check am labs          Quality-Core Measures   Reviewed items::  Labs reviewed and Medications reviewed  Soliz catheter::  No Soliz  DVT prophylaxis pharmacological::  Heparin  Antibiotics:  Treating active infection/contamination beyond 24 hours perioperative coverage

## 2018-02-15 NOTE — PROGRESS NOTES
Asked to come re-evaluate patient for shoulder pain.  Patient states she actually feels much improved.  Her pain is better.     Afebrile past 24 hours    No pain specifically over shoulder, tender over site of I&D  Improved chest wall induration & erythema  No increased pain with shoulder ROM  Able to actively abduct/flex shoulder to 90/90 without significant pain    56 y/o F with R-sided neck abscess managed by ENT.  No concern for septic shoulder on exam    - suspect air seen on x-ray is post-surgical changes from her recent ENT surgery  - no further imaging or intervention required for shoulder  - continue antibiotics per ID recommendations  - continue management of neck infection per ENT

## 2018-02-15 NOTE — CARE PLAN
Problem: Knowledge Deficit  Goal: Knowledge of disease process/condition, treatment plan, diagnostic tests, and medications will improve  Outcome: PROGRESSING AS EXPECTED  Patient and daughter educated about POC.  All questions answered in regards to disease process, treatment, and diet.  Patient and daughter verbalized understanding and voiced no further questions at this time.    Problem: Discharge Barriers/Planning  Goal: Patient's continuum of care needs will be met  Outcome: PROGRESSING AS EXPECTED  Educated patient and family regarding discharge plan with ongoing antibiotics.

## 2018-02-15 NOTE — PROGRESS NOTES
Assumed care of pt. Bedside report received from day R.N. Pt denies sitting up on edge of bed for meal. VSS. All needs met. Bed low and locked, call light in reach. Discussed POC. No signs or symptoms of distress

## 2018-02-15 NOTE — PROGRESS NOTES
Infectious Disease Progress Note    Author: Isidra Khan M.D. Date & Time of service: 2/15/2018  12:10 PM    Chief Complaint:  Staph aureus bacteremia    Interval History:  2018-MAXIMUM TEMPERATURE 103.8 wbc 8.4 platelets 142 creatinine 0.6 says she feels slightly better  2/10/2018-transferred to ICU for A. fib with RVR MAXIMUM TEMPERATURE 99.7 underwent drainage of the right neck abscess on 2/10/2018 WBC 10.   2018-MAXIMUM TEMPERATURE 98.8 ongoing neck pain and now complains of pain in her right upper arm   AF WBC 11.9 feels better overall-no new painful sites-denies SE abx   AF WBC 11.4 just returned from [facility]-tolerated well   AF WBC 13 denies new pain [facility] neg BCxs from  neg  2/15 AF WBC 14 XRAY with gas in SCV region  Labs Reviewed, Medications Reviewed and Radiology Reviewed.    Review of Systems:  Review of Systems   Constitutional: Positive for malaise/fatigue. Negative for chills and fever.   Respiratory: Negative for cough and shortness of breath.    Cardiovascular: Negative for chest pain and leg swelling.   Gastrointestinal: Negative for abdominal pain, nausea and vomiting.   Genitourinary: Negative for dysuria.   Musculoskeletal: Positive for joint pain and myalgias.        Neck pain improved   Skin: Negative for rash.   Neurological: Negative for speech change.   All other systems reviewed and are negative.      Hemodynamics:  Temp (24hrs), Av.7 °C (98.1 °F), Min:36 °C (96.8 °F), Max:37.2 °C (98.9 °F)  Temperature: 37.2 °C (98.9 °F)  Pulse  Av.6  Min: 70  Max: 180  Blood Pressure: 124/71       Physical Exam:  Physical Exam   Constitutional: She is oriented to person, place, and time. She appears well-developed and well-nourished. No distress.   HENT:   Head: Normocephalic and atraumatic.   Mouth/Throat: No oropharyngeal exudate.   Eyes: EOM are normal. Pupils are equal, round, and reactive to light. No scleral icterus.   Neck: Neck supple.   Right neck no  induration-sutures intact   Cardiovascular:   No murmur heard.  Tachycardic  IRR   Pulmonary/Chest: Effort normal. No respiratory distress. She has no wheezes. She has no rales.   Abdominal: She exhibits no distension. There is no tenderness.   Musculoskeletal: She exhibits no edema.   Neurological: She is alert and oriented to person, place, and time.   Skin: No rash noted.   Nursing note and vitals reviewed.      Meds:    Current Facility-Administered Medications:   •  ondansetron  •  Notify provider if pain remains uncontrolled **AND** Use the numeric rating scale (NRS-11) on regular floors and Critical-Care Pain Observation Tool (CPOT) on ICUs/Trauma to assess pain **AND** Pulse Ox (Oximetry) **AND** Pharmacy Consult Request **AND** If patient difficult to arouse and/or has respiratory depression, stop any opiates that are currently infusing and call a Rapid Response. **AND** oxyCODONE immediate-release **AND** oxyCODONE immediate-release **AND** HYDROmorphone  •  metoprolol  •  Metoprolol Tartrate  •  amiodarone  •  heparin  •  ceFAZolin  •  ketorolac  •  cyclobenzaprine  •  senna-docusate **AND** polyethylene glycol/lytes **AND** magnesium hydroxide **AND** bisacodyl  •  Respiratory Care per Protocol  •  acetaminophen    Labs:  Recent Labs      02/13/18   0142  02/14/18   0218  02/15/18   0125   WBC  11.4*  13.0*  14.1*   RBC  3.61*  3.48*  3.50*   HEMOGLOBIN  12.1  11.7*  11.8*   HEMATOCRIT  32.9*  32.2*  32.4*   MCV  91.1  92.5  92.6   MCH  33.5*  33.6*  33.7*   RDW  45.8  47.5  47.7   PLATELETCT  290  345  425   MPV  9.3  9.5  9.3   NEUTSPOLYS  52.90   --    --    LYMPHOCYTES  30.40   --    --    MONOCYTES  9.40   --    --    EOSINOPHILS  2.20   --    --    BASOPHILS  0.70   --    --      Recent Labs      02/13/18   0143  02/14/18   0218  02/15/18   0126   SODIUM  135  134*  134*   POTASSIUM  3.8  3.9  3.8   CHLORIDE  98  98  100   CO2  27  27  26   GLUCOSE  126*  119*  112*   BUN  11  8  7*     Recent Labs       02/13/18   0143  02/14/18   0218  02/15/18   0126   ALBUMIN  3.0*   --    --    TBILIRUBIN  2.1*   --    --    ALKPHOSPHAT  199*   --    --    TOTPROTEIN  6.4   --    --    ALTSGPT  77*   --    --    ASTSGOT  59*   --    --    CREATININE  0.42*  0.43*  0.46*       Imaging:  Ct-soft Tissue Neck With    Result Date: 2/7/2018 2/7/2018 5:01 PM HISTORY/REASON FOR EXAM: Constant right shoulder pain. TECHNIQUE/EXAM DESCRIPTION AND NUMBER OF VIEWS:  CT soft tissue neck with contrast. The study was performed on a helical multidetector CT scanner. Contiguous thin section helical images were obtained of the neck from the skull base through the thoracic inlet. 80 mL of Omnipaque 350 nonionic contrast was injected intravenously. Low dose optimization technique was utilized for this CT exam including automated exposure control and adjustment of the mA and/or kV according to patient size. COMPARISON: None. FINDINGS: There are no mass lesions or fluid collections in the deep or superficial soft tissues of the neck. There is no abnormal enhancement. Cervical lymph nodes show normal size and configuration. There is no pathologic adenopathy evident. Vascular enhancement  of the cervical carotid and vertebral arteries and internal jugular veins appears intact. The nasopharynx, oropharynx, and hypopharynx show normal airways and no abnormal soft tissue swelling. The larynx and trachea are unremarkable. The prevertebral soft tissues appear intact. The parapharyngeal spaces show normal symmetry and fat planes. The pterygopalatine and infratemporal fossae appear intact. The tongue and floor of the mouth are unremarkable. The submandibular, sublingual, and submental spaces appear intact. The parotid and submandibular glands show normal size and density. No abnormal calcifications are evident. The thyroid gland it is enlarged and appears homogeneous. The remaining structures at the thoracic inlet and superior mediastinum within the  field of view are unremarkable. There is minimal posterior disc space narrowing and posterior osteophytic spurring at C5-6 and C6-7. No intracranial abnormalities are evident.     1.  No acute inflammatory process identified in the soft tissues of the neck. 2.  Thyroid gland enlargement. 3.  Minor degenerative changes posteriorly at C5-6 and C6-7.    Dx-chest-portable (1 View)    Result Date: 2/9/2018 2/9/2018 6:05 AM HISTORY/REASON FOR EXAM: Chest Pain TECHNIQUE/EXAM DESCRIPTION:  Single AP view of the chest. COMPARISON: None FINDINGS: Overlying cardiac leads are present. The cardiac silhouette appears within normal limits. Atherosclerotic calcification of the aorta is noted.  The central  pulmonary vasculature appears prominent and indistinct. The lungs appear well expanded bilaterally.  Diffuse scattered hazy pulmonary parenchymal opacities are seen. Veil-like opacities are seen in the right lung base. The bony structures appear age-appropriate.     1.  Pulmonary edema and/or infiltrates. 2.  Small layering right pleural effusion 3.  Atherosclerosis    Dx-shoulder 2+ Right    Result Date: 2/6/2018 2/6/2018 9:05 AM HISTORY/REASON FOR EXAM:  Atraumatic Pain/Swelling/Deformity Acute pain of right shoulder after GLF 2 days ago TECHNIQUE/EXAM DESCRIPTION AND NUMBER OF VIEWS:  3 views of the RIGHT shoulder. COMPARISON: None FINDINGS: No acute fracture or dislocation. Mild osteoarthritis of the AC joint and glenohumeral joint     1. No acute osseous abnormality.    Mr-cervical Spine-with & W/o    Result Date: 2/8/2018 2/7/2018 7:03 PM HISTORY/REASON FOR EXAM:  Atraumatic pain, paresis. Right shoulder pain, headache. TECHNIQUE/EXAM DESCRIPTION: MRI of the cervical spine without and with contrast. The study was performed on a [employer] 1.5 Aubrie MRI scanner. T1 sagittal, T2 fast spin-echo sagittal, T1 postcontrast fat-suppressed sagittal, and gradient-echo axial images were obtained of the cervical spine. 15 mL  Omniscan contrast were administered  intravenously. COMPARISON:  None. FINDINGS:  There is minimal grade 1 retrolisthesis of and C5 on 6. There is mild and vertebral disc space narrowing at C5-6. The disk spaces are well-maintained and have a normal appearance. The cervical cord has a normal caliber, course and signal intensity. There is no definite evidence of abnormal enhancement in the cervical cord or spine. Level specific findings as follows: C2-C3: No significant central canal or neural foraminal stenosis. C3-C4: No significant central canal or neural foraminal stenosis. C4-C5: There is mild posterior midline disc bulge which effaces anterior thecal sac. There is borderline central canal stenosis. C5-C6: There is mild broad posterior disc bulge and endplate spurring. There is mild ligamentum flavum hypertrophy. There is mild central canal stenosis. There is mild to moderate right and moderate left neural foraminal stenosis secondary to endplate spurring and facet arthropathy. C6-C7: There is mild broad posterior disc bulge. There is mild ligamentum flavum hypertrophy. There is mild central canal stenosis. There is moderate left neural foraminal stenosis secondary to disc bulge and facet arthropathy. C7-T1: No significant central canal or neural foraminal stenosis. The visualized prevertebral and posterior paraspinous soft tissues are grossly unremarkable.     1.  No acute abnormality or abnormal enhancement in the cervical spine. If there is strong clinical suspicion for discitis/osteomyelitis or epidural infectious process, short interval repeat imaging is recommended. 2.  Multilevel degenerative changes of the cervical spine as described above.    Mr-lumbar Spine-with & W/o    Result Date: 2/8/2018 2/7/2018 7:03 PM HISTORY/REASON FOR EXAM:  Atraumatic back pain. TECHNIQUE/EXAM DESCRIPTION: MRI of the lumbar spine without and with contrast. The study was performed on a [employer] Signa 1.5 Aubrie MRI scanner. T1  sagittal, T2 fast spin-echo sagittal, and T2 axial images were obtained of the lumbar spine. T1 postcontrast fat-suppressed sagittal images were obtained. 15 mL Omniscan contrast was administered intravenously. COMPARISON:  None. FINDINGS: Marrow signal intensity is normal. There is no abnormal enhancement. Vertebral body heights are preserved. Vertebral alignment is normal. There is mild intervertebral disc space narrowing at L5-S1 with decrease of the normal T2 signal intensity within the disc space consistent with mild degenerative disc desiccation. The spinal cord demonstrates normal course and caliber. There is no abnormal cord enhancement. Conus terminates at the T12/L1 level. Level-specific findings as follows: L5-S1: There is mild broad posterior disc bulge. There is mild facet arthropathy. There is mild bilateral neural foraminal stenosis. L4-5: There is mild diffuse disc bulge. There is minimal bilateral neural foraminal stenosis. L3-4: No significant central canal or neural foraminal stenosis. L2-3: No significant central canal or neural foraminal stenosis. L1-2: No significant central canal or neural foraminal stenosis. The visualized prevertebral and posterior paraspinous soft tissues are grossly unremarkable.     1.  No acute abnormality or abnormal enhancement in the lumbar spine. If there is strong clinical suspicion for discitis/osteomyelitis or epidural infectious process, short interval repeat imaging is recommended. 2.  Multilevel degenerative changes of the lumbar spine as described above.    Mr-shoulder-w/o Right    Addendum Date: 2/8/2018    I further discussed this case with Dr. Badillo at 4:20 PM on 2/8/2018. The patient is exquisitely tender over the medial clavicle. That area is not included on this study. There is however likely some edema and induration of the soft tissues surrounding the mid and medial clavicle at the edge of the images. Review of CT scan of the neck and chest from the  same date demonstrates induration of the soft tissues surrounding the right medial clavicle as well as centered in the area of the sternoclavicular joint. These findings would be suspicious for septic arthritis/osteomyelitis of the right sternoclavicular joint.    Result Date: 2/8/2018 2/7/2018 7:03 PM HISTORY/REASON FOR EXAM: Right-sided shoulder pain TECHNIQUE/EXAM DESCRIPTION: MRI of the RIGHT shoulder without contrast. Using a [employer] Signa 1.5 Aubrie MRI scanner, T1 sagittal, fast spin-echo T2 fat-suppressed oblique coronal, sagittal, and axial and intermediate fast spin-echo oblique coronal images were obtained. COMPARISON: None. FINDINGS: Osseous acromial outlet: The acromion demonstrates a curved undersurface. There is no lateral downsloping. There is no evidence of an inferiorly directed subacromial enthesophyte. There is mild degenerative change of the acromioclavicular joint. There are no inferiorly directed osteophytes. There is minimal fluid seen within the subacromial/subdeltoid bursa. Rotator cuff: There is tendinopathy involving the supraspinatus tendon with mild fraying of the bursal surface fibers. No evidence of full-thickness tear or retraction. Glenoid labrum: There is no definite superior labral tear or evidence of disruption of the biceps anchor. There is a multiloculated fluid signal focus seen anterior to the subscapularis tendon which measures 2.4 x 1.4 x 1.1 cm in size. This extends towards the anterior/superior labrum. The anterior/inferior and posterior/inferior glenoid labrum are intact. Osseous structures/Cartilaginous surfaces: No evidence of marrow edema.  Cartilaginous surfaces are well maintained. Miscellaneous: No significant joint effusion. The long head of the biceps tendon is appropriately situated within the bicipital groove.     1.  Mild tendinopathy of the supraspinatus tendon with very mild fraying of the bursal surface fibers. No evidence of full-thickness tear or retraction.  2.  No definite tear of the glenoid labrum. There is however a multiloculated fluid signal focus present anterior to the subscapularis tendon which extends towards the anterior/superior labrum. This may represent a ganglion cyst but can also potential he  represent a paralabral cyst related to nonvisualized superior labral tear. 3.  Mild degenerative change of the acromioclavicular joint.    Mr-thoracic Spine-with & W/o    Result Date: 2/8/2018 2/7/2018 7:03 PM HISTORY/REASON FOR EXAM: Atraumatic back and neck pain. TECHNIQUE/EXAM DESCRIPTION: MRI of the thoracic spine without and with contrast. The study was performed on a [employer] Signa 1.5 Aubrie MRI scanner. T1 sagittal, T1 postcontrast fat-suppressed sagittal, T2 sagittal, and T2 axial images were obtained of the thoracic spine. 15 mL Omniscan contrast were administered intravenously. COMPARISON:  None FINDINGS: The thoracic vertebral bodies have a normal height and alignment. The thoracic disks have a normal appearance and signal intensity. The thoracic cord has a normal caliber, course and signal intensity. There is no evidence of abnormal enhancement in the thoracic cord or spine. There is no evidence of disk extrusion, cord compression, central canal stenosis, or neural foraminal stenosis. There is atelectasis and/or consolidation in the visualized portion of the right lung base.     1.  No acute abnormality or abnormal enhancement in the thoracic spine. If there is strong clinical suspicion for discitis/osteomyelitis or epidural infectious process, short interval repeat imaging is recommended. 2.  Possible right lung base pneumonitis.    Echocardiogram Comp W/o Cont    Result Date: 2/9/2018  Transthoracic Echo Report Echocardiography Laboratory CONCLUSIONS No prior study is available for comparison. Normal left ventricular chamber size. Left ventricular ejection fraction is visually estimated to be 60%. No significant valve abnormalities. Estimated right  ventricular systolic pressure is 45 mmHg. auto-interpretation LV EF:  60    % FINDINGS Left Ventricle Normal left ventricular chamber size. Normal left ventricular wall thickness. Normal left ventricular systolic function. Left ventricular ejection fraction is visually estimated to be 60%. Normal regional wall motion. Normal diastolic function. Right Ventricle The right ventricle was normal in size and function. Right Atrium The right atrium is normal in size. Normal inferior vena cava size and inspiratory collapse. Left Atrium The left atrium is normal in size. Left atrial volume index is 21 mL/sq m. Mitral Valve Structurally normal mitral valve without significant stenosis or regurgitation. Aortic Valve Structurally normal aortic valve without significant stenosis or regurgitation. Tricuspid Valve Structurally normal tricuspid valve. No tricuspid stenosis. Trace tricuspid regurgitation. Estimated right ventricular systolic pressure is 45 mmHg. Pulmonic Valve Structurally normal pulmonic valve without significant stenosis or regurgitation. Pericardium Normal pericardium without effusion. Aorta The aortic root is normal. Ascending aorta diameter is 2.9 cm. Vinnie Naqvi MD (Electronically Signed) Final Date:     09 February 2018                 14:50    Ct-cta Chest Pulmonary Artery W/ Recons    Result Date: 2/7/2018 2/7/2018 5:01 PM HISTORY/REASON FOR EXAM:  Chest pain. TECHNIQUE/EXAM DESCRIPTION: CT angiogram scan for pulmonary embolism with contrast, with reconstructions. 1.25 mm helical sections were obtained from the lung apices through the lung bases following the rapid bolus administration of 80 mL of Omnipaque 350 nonionic contrast. Thin-section overlapping reconstruction interval was utilized. Coronal reconstructions were generated from the axial data. MIP post processing was performed and utilized for the interpretation. Low dose optimization technique was utilized for this CT exam including automated  "exposure control and adjustment of the mA and/or kV according to patient size. COMPARISON: None FINDINGS: Pulmonary Embolism: No. Main Pulmonary Arteries: No. Segmental branches: No. Subsegmental branches: No. Additional Comments: The heart is normal in size and configuration. Lungs: There is minimal atelectasis or pneumonia in the posterior aspect of the right lower lobe and minimally in the anterior inferior aspect of the lingula. The remainder of the lung parenchyma is clear. Pleura: No pleural effusion. Nodes: No enlarged lymph nodes. Additional findings: There is mild enlargement of both adrenal glands. No hydronephrosis is visualized     1.  No evidence of pulmonary embolism. 2.  Minimal atelectasis or pneumonia in the right lower lobe and in the lingula. 3.  Mild bilateral adrenal gland enlargement. Significance unknown.       Micro:  Results     Procedure Component Value Units Date/Time    BLOOD CULTURE [[id number]] Collected:  02/09/18 1641    Order Status:  Completed Specimen:  Blood from Peripheral Updated:  02/14/18 1900     Significant Indicator NEG     Source BLD     Site PERIPHERAL     Blood Culture No growth after 5 days of incubation.    Narrative:       Per Hospital Policy: Only change Specimen Src: to \"Line\" if  specified by physician order.    BLOOD CULTURE [[id number]] Collected:  02/13/18 0142    Order Status:  Completed Specimen:  Blood from Peripheral Updated:  02/14/18 0904     Significant Indicator NEG     Source BLD     Site PERIPHERAL     Blood Culture --     No Growth    Note: Blood cultures are incubated for 5 days and  are monitored continuously.Positive blood cultures  are called to the RN and reported as soon as  they are identified.      Narrative:       Per Hospital Policy: Only change Specimen Src: to \"Line\" if  specified by physician order.    BLOOD CULTURE [[id number]] Collected:  02/13/18 0142    Order Status:  Completed Specimen:  Blood from Peripheral Updated:  02/14/18 0904     " "Significant Indicator NEG     Source BLD     Site PERIPHERAL     Blood Culture --     No Growth    Note: Blood cultures are incubated for 5 days and  are monitored continuously.Positive blood cultures  are called to the RN and reported as soon as  they are identified.      Narrative:       Per Hospital Policy: Only change Specimen Src: to \"Line\" if  specified by physician order.    CULTURE WOUND W/ GRAM STAIN [[id number]]  (Abnormal)  (Susceptibility) Collected:  02/10/18 1235    Order Status:  Completed Specimen:  Wound Updated:  02/13/18 1123     Significant Indicator POS (POS)     Source WND     Site Neck Abscess     Culture Result Wound -- (A)     Gram Stain Result --     Few WBCs.  Rare Gram positive cocci.       Culture Result Wound -- (A)     Staphylococcus aureus  Light growth      Culture & Susceptibility     STAPHYLOCOCCUS AUREUS     Antibiotic Sensitivity Microscan Unit Status    Ampicillin/sulbactam Sensitive <=8/4 mcg/mL Final    Clindamycin Sensitive <=0.5 mcg/mL Final    Daptomycin Sensitive 1 mcg/mL Final    Erythromycin Sensitive <=0.5 mcg/mL Final    Moxifloxacin Sensitive <=0.5 mcg/mL Final    Oxacillin Sensitive <=0.25 mcg/mL Final    Tetracycline Sensitive <=4 mcg/mL Final    Trimeth/Sulfa Sensitive <=0.5/9.5 mcg/mL Final    Vancomycin Sensitive 2 mcg/mL Final                       ANAEROBIC CULTURE [[id number]] Collected:  02/10/18 1235    Order Status:  Completed Specimen:  Wound Updated:  02/13/18 1123     Significant Indicator NEG     Source WND     Site Neck Abscess     Anaerobic Culture, Culture Res No Anaerobes isolated.    BLOOD CULTURE [[id number]]  (Abnormal) Collected:  02/09/18 1641    Order Status:  Completed Specimen:  Blood from Peripheral Updated:  02/13/18 0951     Significant Indicator POS (POS)     Source BLD     Site PERIPHERAL     Blood Culture -- (A)     Growth detected by Bactec instrument.  02/11/2018  13:33  Staphylococcus aureus (methicillin sensitive)  detected by PCR.   " "    Blood Culture -- (A)     Staphylococcus aureus  See previous culture for sensitivity report.      Narrative:       CALL  [place]  161 tel. [phone],  CALLED  161 tel. [phone] 02/11/2018, 13:33, RB PERF. RESULTS CALLED  TO:[id number],RN;America escobar  Per Hospital Policy: Only change Specimen Src: to \"Line\" if  specified by physician order.    BLOOD CULTURE [[id number]] Collected:  02/12/18 1644    Order Status:  Completed Specimen:  Blood from Peripheral Updated:  02/13/18 0910     Significant Indicator NEG     Source BLD     Site PERIPHERAL     Blood Culture --     No Growth    Note: Blood cultures are incubated for 5 days and  are monitored continuously.Positive blood cultures  are called to the RN and reported as soon as  they are identified.      Narrative:       Per Hospital Policy: Only change Specimen Src: to \"Line\" if  specified by physician order.    BLOOD CULTURE [[id number]] Collected:  02/12/18 1507    Order Status:  Completed Specimen:  Blood from Peripheral Updated:  02/13/18 0910     Significant Indicator NEG     Source BLD     Site PERIPHERAL     Blood Culture --     No Growth    Note: Blood cultures are incubated for 5 days and  are monitored continuously.Positive blood cultures  are called to the RN and reported as soon as  they are identified.      Narrative:       Per Hospital Policy: Only change Specimen Src: to \"Line\" if  specified by physician order.    GRAM STAIN [[id number]] Collected:  02/10/18 1235    Order Status:  Completed Specimen:  Wound Updated:  02/11/18 0851     Significant Indicator .     Source WND     Site Neck Abscess     Gram Stain Result --     Few WBCs.  Rare Gram positive cocci.      BLOOD CULTURE [[id number]]  (Abnormal)  (Susceptibility) Collected:  02/07/18 1848    Order Status:  Completed Specimen:  Blood from Peripheral Updated:  02/10/18 0842     Significant Indicator POS (POS)     Source BLD     Site PERIPHERAL     Blood Culture -- (A)     Growth detected by Bactec " "instrument.  02/08/2018  08:43  Staphylococcus aureus (methicillin sensitive)  detected by PCR.       Blood Culture Staphylococcus aureus (A)    Narrative:       CALL  Pl[place]encia  171 tel. [phone],  CALLED  171 tel. [phone] 02/08/2018, 08:49, RB PERF. RESULTS CALLED  TO:[id number] and DILAN  2 of 2 blood culture x2  Sites order. Per Hospital Policy:  Only change Specimen Src: to \"Line\" if specified by physician  order.    Culture & Susceptibility     STAPHYLOCOCCUS AUREUS     Antibiotic Sensitivity Microscan Unit Status    Ampicillin/sulbactam Sensitive <=8/4 mcg/mL Final    Clindamycin Sensitive <=0.5 mcg/mL Final    Daptomycin Sensitive <=0.5 mcg/mL Final    Erythromycin Sensitive <=0.5 mcg/mL Final    Moxifloxacin Sensitive <=0.5 mcg/mL Final    Oxacillin Sensitive <=0.25 mcg/mL Final    Tetracycline Sensitive <=4 mcg/mL Final    Trimeth/Sulfa Sensitive <=0.5/9.5 mcg/mL Final    Vancomycin Sensitive 1 mcg/mL Final                       BLOOD CULTURE [[id number]]  (Abnormal) Collected:  02/07/18 1848    Order Status:  Completed Specimen:  Blood from Peripheral Updated:  02/10/18 0842     Significant Indicator POS (POS)     Source BLD     Site PERIPHERAL     Blood Culture Growth detected by Bactec instrument.  02/08/2018  08:50 (A)     Blood Culture -- (A)     Staphylococcus aureus  See previous culture for sensitivity report.      Narrative:       CALL  [place]  171 tel. [phone],  CALLED  171 tel. [phone] 02/08/2018, 08:50, RB PERF. RESULTS CALLED  TO:[id number] and DILAN  1 of 2 for Blood Culture x 2 sites order. Per Hospital  Policy: Only change Specimen Src: to \"Line\" if specified by  physician order.    FLUID CULTURE W/GRAM STAIN [[id number]]     Order Status:  Canceled Specimen:  Body Fluid from Other Body Fluid           Assessment:  Active Hospital Problems    Diagnosis   • *Sepsis (CMS-HCC) [A41.9]   • Hypokalemia [E87.6]   • Lactic acidosis [E87.2]   • Neck pain [M54.2]   • Hyponatremia [E87.1]   • CAP (community " acquired pneumonia) [J18.9]       Plan:  MSSA sepsis  Fever resolved  +leukocytosis  Blood cultures + 2/7 and 2/9  Repeat blood cultures 2/12 neg  TTE is negative from 2/9/28- [facility] neg  MRI T spine neg  MRI shoulder no annika abscess/XRAY with gas-Ortho to eval  PICC ordered  Anticipate 4 weeks IV abx from date of neg blood cxs  Stop date 3/12/2018-orders faxed to     Leukocytosis, increased  Multifactorial  Afebrile and repeat cxs neg  No new sites infection  Encouraged OOB and IS  Monitor    Right neck abscess  Underwent I&D on 2/10/2018  Cultures +MSSA  Wound care  Abx as above    Pneumonia  MSSA  Abx as above  Monitor closely for complications    Discussed with internal medicine Dr Davalos

## 2018-02-16 ENCOUNTER — APPOINTMENT (OUTPATIENT)
Dept: ONCOLOGY | Facility: MEDICAL CENTER | Age: 58
End: 2018-02-16
Attending: INTERNAL MEDICINE
Payer: COMMERCIAL

## 2018-02-16 ENCOUNTER — PATIENT OUTREACH (OUTPATIENT)
Dept: HEALTH INFORMATION MANAGEMENT | Facility: OTHER | Age: 58
End: 2018-02-16

## 2018-02-16 VITALS
SYSTOLIC BLOOD PRESSURE: 123 MMHG | HEART RATE: 75 BPM | TEMPERATURE: 98.2 F | OXYGEN SATURATION: 90 % | DIASTOLIC BLOOD PRESSURE: 82 MMHG | HEIGHT: 61 IN | BODY MASS INDEX: 27.64 KG/M2 | WEIGHT: 146.39 LBS | RESPIRATION RATE: 18 BRPM

## 2018-02-16 LAB
ANION GAP SERPL CALC-SCNC: 9 MMOL/L (ref 0–11.9)
BUN SERPL-MCNC: 9 MG/DL (ref 8–22)
CALCIUM SERPL-MCNC: 8.9 MG/DL (ref 8.5–10.5)
CHLORIDE SERPL-SCNC: 100 MMOL/L (ref 96–112)
CO2 SERPL-SCNC: 26 MMOL/L (ref 20–33)
CREAT SERPL-MCNC: 0.49 MG/DL (ref 0.5–1.4)
ERYTHROCYTE [DISTWIDTH] IN BLOOD BY AUTOMATED COUNT: 49.5 FL (ref 35.9–50)
GLUCOSE SERPL-MCNC: 123 MG/DL (ref 65–99)
HCT VFR BLD AUTO: 32.5 % (ref 37–47)
HGB BLD-MCNC: 11.4 G/DL (ref 12–16)
MCH RBC QN AUTO: 32.9 PG (ref 27–33)
MCHC RBC AUTO-ENTMCNC: 35.1 G/DL (ref 33.6–35)
MCV RBC AUTO: 93.9 FL (ref 81.4–97.8)
PLATELET # BLD AUTO: 337 K/UL (ref 164–446)
PMV BLD AUTO: 8.7 FL (ref 9–12.9)
POTASSIUM SERPL-SCNC: 4 MMOL/L (ref 3.6–5.5)
RBC # BLD AUTO: 3.46 M/UL (ref 4.2–5.4)
SODIUM SERPL-SCNC: 135 MMOL/L (ref 135–145)
WBC # BLD AUTO: 13.2 K/UL (ref 4.8–10.8)

## 2018-02-16 PROCEDURE — 80048 BASIC METABOLIC PNL TOTAL CA: CPT

## 2018-02-16 PROCEDURE — 700102 HCHG RX REV CODE 250 W/ 637 OVERRIDE(OP): Performed by: INTERNAL MEDICINE

## 2018-02-16 PROCEDURE — 700111 HCHG RX REV CODE 636 W/ 250 OVERRIDE (IP): Performed by: INTERNAL MEDICINE

## 2018-02-16 PROCEDURE — 85027 COMPLETE CBC AUTOMATED: CPT

## 2018-02-16 PROCEDURE — A9270 NON-COVERED ITEM OR SERVICE: HCPCS | Performed by: HOSPITALIST

## 2018-02-16 PROCEDURE — A9270 NON-COVERED ITEM OR SERVICE: HCPCS | Performed by: INTERNAL MEDICINE

## 2018-02-16 PROCEDURE — 700105 HCHG RX REV CODE 258: Performed by: INTERNAL MEDICINE

## 2018-02-16 PROCEDURE — 99239 HOSP IP/OBS DSCHRG MGMT >30: CPT | Performed by: INTERNAL MEDICINE

## 2018-02-16 PROCEDURE — 700102 HCHG RX REV CODE 250 W/ 637 OVERRIDE(OP): Performed by: HOSPITALIST

## 2018-02-16 PROCEDURE — 700111 HCHG RX REV CODE 636 W/ 250 OVERRIDE (IP): Performed by: HOSPITALIST

## 2018-02-16 RX ORDER — OXYCODONE HYDROCHLORIDE 5 MG/1
5 TABLET ORAL EVERY 6 HOURS PRN
Qty: 12 TAB | Refills: 0 | Status: SHIPPED | OUTPATIENT
Start: 2018-02-16 | End: 2018-02-19

## 2018-02-16 RX ORDER — AMIODARONE HYDROCHLORIDE 200 MG/1
200 TABLET ORAL 2 TIMES DAILY
Qty: 60 TAB | Refills: 0 | Status: SHIPPED | OUTPATIENT
Start: 2018-02-16 | End: 2018-03-14 | Stop reason: SDUPTHER

## 2018-02-16 RX ADMIN — AMIODARONE HYDROCHLORIDE 200 MG: 200 TABLET ORAL at 09:25

## 2018-02-16 RX ADMIN — CEFAZOLIN SODIUM 2 G: 2 INJECTION, SOLUTION INTRAVENOUS at 02:23

## 2018-02-16 RX ADMIN — DAPTOMYCIN 400 MG: 500 INJECTION, POWDER, LYOPHILIZED, FOR SOLUTION INTRAVENOUS at 11:44

## 2018-02-16 RX ADMIN — OXYCODONE HYDROCHLORIDE 5 MG: 5 TABLET ORAL at 00:55

## 2018-02-16 RX ADMIN — METOPROLOL TARTRATE 25 MG: 25 TABLET, FILM COATED ORAL at 09:25

## 2018-02-16 RX ADMIN — HEPARIN SODIUM 5000 UNITS: 5000 INJECTION, SOLUTION INTRAVENOUS; SUBCUTANEOUS at 05:44

## 2018-02-16 RX ADMIN — CEFAZOLIN SODIUM 2 G: 2 INJECTION, SOLUTION INTRAVENOUS at 09:25

## 2018-02-16 ASSESSMENT — COGNITIVE AND FUNCTIONAL STATUS - GENERAL
SUGGESTED CMS G CODE MODIFIER DAILY ACTIVITY: CH
DAILY ACTIVITIY SCORE: 24
MOBILITY SCORE: 24
SUGGESTED CMS G CODE MODIFIER MOBILITY: CH

## 2018-02-16 ASSESSMENT — ENCOUNTER SYMPTOMS
VOMITING: 0
COUGH: 0
CHILLS: 0
FEVER: 0
SHORTNESS OF BREATH: 0
MYALGIAS: 1
ABDOMINAL PAIN: 0
SPEECH CHANGE: 0
NAUSEA: 0

## 2018-02-16 ASSESSMENT — PAIN SCALES - GENERAL
PAINLEVEL_OUTOF10: 0
PAINLEVEL_OUTOF10: 1
PAINLEVEL_OUTOF10: 1
PAINLEVEL_OUTOF10: 5

## 2018-02-16 ASSESSMENT — LIFESTYLE VARIABLES: EVER_SMOKED: NEVER

## 2018-02-16 NOTE — PROGRESS NOTES
"Urszula Persaud is a 57 y.o. female patient.  1. Sepsis, due to unspecified organism (CMS-HCC)    2. Neck pain    3. Pneumonia of right lower lobe due to infectious organism (CMS-HCC)      History reviewed. No pertinent past medical history.        No Known Allergies  Principal Problem:    Sepsis (CMS-HCC)  Active Problems:    Abscess or cellulitis, neck    Atrial fibrillation with RVR (CMS-HCC)    Hypokalemia    Lactic acidosis    Hyponatremia    CAP (community acquired pneumonia)    Right shoulder pain    Blood pressure 123/82, pulse 75, temperature 36.8 °C (98.2 °F), resp. rate 18, height 1.549 m (5' 1\"), weight 66.4 kg (146 lb 6.2 oz), SpO2 90 %, not currently breastfeeding.    Subjective Doing well  Objective neck minimal edema, pain  Assessment & Plan   S/P I&D neck  Sutures removed  Okay to go home   F/u with me in about 1week    Millie Perez MD  2/16/2018  "

## 2018-02-16 NOTE — PROGRESS NOTES
Infectious Disease Progress Note    Author: Isidra Khan M.D. Date & Time of service: 2018  12:54 PM    Chief Complaint:  Staph aureus bacteremia    Interval History:  2018-MAXIMUM TEMPERATURE 103.8 wbc 8.4 platelets 142 creatinine 0.6 says she feels slightly better  2/10/2018-transferred to ICU for A. fib with RVR MAXIMUM TEMPERATURE 99.7 underwent drainage of the right neck abscess on 2/10/2018 WBC 10.   2018-MAXIMUM TEMPERATURE 98.8 ongoing neck pain and now complains of pain in her right upper arm   AF WBC 11.9 feels better overall-no new painful sites-denies SE abx   AF WBC 11.4 just returned from [facility]-tolerated well   AF WBC 13 denies new pain [facility] neg BCxs from  neg  2/15 AF WBC 14 XRAY with gas in SCV region   AF denies shoulder pain-tolerating abx well  Labs Reviewed, Medications Reviewed and Radiology Reviewed.    Review of Systems:  Review of Systems   Constitutional: Positive for malaise/fatigue. Negative for chills and fever.   Respiratory: Negative for cough and shortness of breath.    Cardiovascular: Negative for chest pain and leg swelling.   Gastrointestinal: Negative for abdominal pain, nausea and vomiting.   Genitourinary: Negative for dysuria.   Musculoskeletal: Positive for joint pain and myalgias.        Neck pain improved  No palpable shoulder pain   Skin: Negative for rash.   Neurological: Negative for speech change.   All other systems reviewed and are negative.      Hemodynamics:  Temp (24hrs), Av.8 °C (98.2 °F), Min:36.4 °C (97.5 °F), Max:37.3 °C (99.2 °F)  Temperature: 36.8 °C (98.2 °F)  Pulse  Av.6  Min: 68  Max: 180  Blood Pressure: 123/82       Physical Exam:  Physical Exam   Constitutional: She is oriented to person, place, and time. She appears well-developed and well-nourished. No distress.   HENT:   Head: Normocephalic and atraumatic.   Mouth/Throat: No oropharyngeal exudate.   Eyes: EOM are normal. Pupils are equal, round, and  reactive to light. No scleral icterus.   Neck: Neck supple.   Right neck no induration-sutures removed   Cardiovascular:   No murmur heard.  Tachycardic  IRR   Pulmonary/Chest: Effort normal. No respiratory distress. She has no wheezes. She has no rales.   Abdominal: She exhibits no distension. There is no tenderness.   Musculoskeletal: She exhibits no edema.   Neurological: She is alert and oriented to person, place, and time.   Skin: No rash noted. No erythema.   Nursing note and vitals reviewed.      Meds:    Current Facility-Administered Medications:   •  PICC Line Insertion has been implemented **AND** May use Lidocaine 1% not to exceed 3 mls for local at insertion site **AND** NOTIFY MD **AND** Tip to dwell in the superior vena cava **AND** Do not use PICC Line until placement verified by Chest X Ray **AND** DX-CHEST-FOR PICC LINE Perform procedure in: PICC Room **AND** If radiologist reading of chest X-ray states any of the following the PICC should be used **AND** Further evaluation of the PICC placement can be retrieved from X-Ray and Imaging **AND** Blood draws through PICC line; draws by RN only **AND** FLUSHING GUIDELINES WHEN IN USE **AND** normal saline PF **AND** FLUSHING GUIDELINES WHEN NOT IN USE **AND** DRESSING MAINTENANCE **AND** Change needleless pressure ports and IV tubing every 72 hours per hospital policy **AND** TUBING **AND** If there is an MD order to remove the PICC line, any RN may remove the PICC line **AND** [] PATIENT EDUCATION MATERIALS **AND** NURSING COMMUNICATION  •  ondansetron  •  Notify provider if pain remains uncontrolled **AND** Use the numeric rating scale (NRS-11) on regular floors and Critical-Care Pain Observation Tool (CPOT) on ICUs/Trauma to assess pain **AND** Pulse Ox (Oximetry) **AND** Pharmacy Consult Request **AND** If patient difficult to arouse and/or has respiratory depression, stop any opiates that are currently infusing and call a Rapid Response.  **AND** oxyCODONE immediate-release **AND** oxyCODONE immediate-release **AND** HYDROmorphone  •  metoprolol  •  Metoprolol Tartrate  •  amiodarone  •  heparin  •  ceFAZolin  •  ketorolac  •  cyclobenzaprine  •  senna-docusate **AND** polyethylene glycol/lytes **AND** magnesium hydroxide **AND** bisacodyl  •  Respiratory Care per Protocol  •  acetaminophen    Labs:  Recent Labs      02/14/18   0218  02/15/18   0125  02/16/18   0230   WBC  13.0*  14.1*  13.2*   RBC  3.48*  3.50*  3.46*   HEMOGLOBIN  11.7*  11.8*  11.4*   HEMATOCRIT  32.2*  32.4*  32.5*   MCV  92.5  92.6  93.9   MCH  33.6*  33.7*  32.9   RDW  47.5  47.7  49.5   PLATELETCT  345  425  337   MPV  9.5  9.3  8.7*     Recent Labs      02/14/18   0218  02/15/18   0126  02/16/18   0230   SODIUM  134*  134*  135   POTASSIUM  3.9  3.8  4.0   CHLORIDE  98  100  100   CO2  27  26  26   GLUCOSE  119*  112*  123*   BUN  8  7*  9     Recent Labs      02/14/18   0218  02/15/18   0126  02/16/18   0230   CREATININE  0.43*  0.46*  0.49*       Imaging:  Ct-soft Tissue Neck With    Result Date: 2/7/2018 2/7/2018 5:01 PM HISTORY/REASON FOR EXAM: Constant right shoulder pain. TECHNIQUE/EXAM DESCRIPTION AND NUMBER OF VIEWS:  CT soft tissue neck with contrast. The study was performed on a helical multidetector CT scanner. Contiguous thin section helical images were obtained of the neck from the skull base through the thoracic inlet. 80 mL of Omnipaque 350 nonionic contrast was injected intravenously. Low dose optimization technique was utilized for this CT exam including automated exposure control and adjustment of the mA and/or kV according to patient size. COMPARISON: None. FINDINGS: There are no mass lesions or fluid collections in the deep or superficial soft tissues of the neck. There is no abnormal enhancement. Cervical lymph nodes show normal size and configuration. There is no pathologic adenopathy evident. Vascular enhancement  of the cervical carotid and vertebral  arteries and internal jugular veins appears intact. The nasopharynx, oropharynx, and hypopharynx show normal airways and no abnormal soft tissue swelling. The larynx and trachea are unremarkable. The prevertebral soft tissues appear intact. The parapharyngeal spaces show normal symmetry and fat planes. The pterygopalatine and infratemporal fossae appear intact. The tongue and floor of the mouth are unremarkable. The submandibular, sublingual, and submental spaces appear intact. The parotid and submandibular glands show normal size and density. No abnormal calcifications are evident. The thyroid gland it is enlarged and appears homogeneous. The remaining structures at the thoracic inlet and superior mediastinum within the field of view are unremarkable. There is minimal posterior disc space narrowing and posterior osteophytic spurring at C5-6 and C6-7. No intracranial abnormalities are evident.     1.  No acute inflammatory process identified in the soft tissues of the neck. 2.  Thyroid gland enlargement. 3.  Minor degenerative changes posteriorly at C5-6 and C6-7.    Dx-chest-portable (1 View)    Result Date: 2/9/2018 2/9/2018 6:05 AM HISTORY/REASON FOR EXAM: Chest Pain TECHNIQUE/EXAM DESCRIPTION:  Single AP view of the chest. COMPARISON: None FINDINGS: Overlying cardiac leads are present. The cardiac silhouette appears within normal limits. Atherosclerotic calcification of the aorta is noted.  The central  pulmonary vasculature appears prominent and indistinct. The lungs appear well expanded bilaterally.  Diffuse scattered hazy pulmonary parenchymal opacities are seen. Veil-like opacities are seen in the right lung base. The bony structures appear age-appropriate.     1.  Pulmonary edema and/or infiltrates. 2.  Small layering right pleural effusion 3.  Atherosclerosis    Dx-shoulder 2+ Right    Result Date: 2/6/2018 2/6/2018 9:05 AM HISTORY/REASON FOR EXAM:  Atraumatic Pain/Swelling/Deformity Acute pain of  right shoulder after GLF 2 days ago TECHNIQUE/EXAM DESCRIPTION AND NUMBER OF VIEWS:  3 views of the RIGHT shoulder. COMPARISON: None FINDINGS: No acute fracture or dislocation. Mild osteoarthritis of the AC joint and glenohumeral joint     1. No acute osseous abnormality.    Mr-cervical Spine-with & W/o    Result Date: 2/8/2018 2/7/2018 7:03 PM HISTORY/REASON FOR EXAM:  Atraumatic pain, paresis. Right shoulder pain, headache. TECHNIQUE/EXAM DESCRIPTION: MRI of the cervical spine without and with contrast. The study was performed on a [employer] Signa 1.5 Aubrie MRI scanner. T1 sagittal, T2 fast spin-echo sagittal, T1 postcontrast fat-suppressed sagittal, and gradient-echo axial images were obtained of the cervical spine. 15 mL Omniscan contrast were administered  intravenously. COMPARISON:  None. FINDINGS:  There is minimal grade 1 retrolisthesis of and C5 on 6. There is mild and vertebral disc space narrowing at C5-6. The disk spaces are well-maintained and have a normal appearance. The cervical cord has a normal caliber, course and signal intensity. There is no definite evidence of abnormal enhancement in the cervical cord or spine. Level specific findings as follows: C2-C3: No significant central canal or neural foraminal stenosis. C3-C4: No significant central canal or neural foraminal stenosis. C4-C5: There is mild posterior midline disc bulge which effaces anterior thecal sac. There is borderline central canal stenosis. C5-C6: There is mild broad posterior disc bulge and endplate spurring. There is mild ligamentum flavum hypertrophy. There is mild central canal stenosis. There is mild to moderate right and moderate left neural foraminal stenosis secondary to endplate spurring and facet arthropathy. C6-C7: There is mild broad posterior disc bulge. There is mild ligamentum flavum hypertrophy. There is mild central canal stenosis. There is moderate left neural foraminal stenosis secondary to disc bulge and facet  arthropathy. C7-T1: No significant central canal or neural foraminal stenosis. The visualized prevertebral and posterior paraspinous soft tissues are grossly unremarkable.     1.  No acute abnormality or abnormal enhancement in the cervical spine. If there is strong clinical suspicion for discitis/osteomyelitis or epidural infectious process, short interval repeat imaging is recommended. 2.  Multilevel degenerative changes of the cervical spine as described above.    Mr-lumbar Spine-with & W/o    Result Date: 2/8/2018 2/7/2018 7:03 PM HISTORY/REASON FOR EXAM:  Atraumatic back pain. TECHNIQUE/EXAM DESCRIPTION: MRI of the lumbar spine without and with contrast. The study was performed on a [employer] Signa 1.5 Aubrie MRI scanner. T1 sagittal, T2 fast spin-echo sagittal, and T2 axial images were obtained of the lumbar spine. T1 postcontrast fat-suppressed sagittal images were obtained. 15 mL Omniscan contrast was administered intravenously. COMPARISON:  None. FINDINGS: Marrow signal intensity is normal. There is no abnormal enhancement. Vertebral body heights are preserved. Vertebral alignment is normal. There is mild intervertebral disc space narrowing at L5-S1 with decrease of the normal T2 signal intensity within the disc space consistent with mild degenerative disc desiccation. The spinal cord demonstrates normal course and caliber. There is no abnormal cord enhancement. Conus terminates at the T12/L1 level. Level-specific findings as follows: L5-S1: There is mild broad posterior disc bulge. There is mild facet arthropathy. There is mild bilateral neural foraminal stenosis. L4-5: There is mild diffuse disc bulge. There is minimal bilateral neural foraminal stenosis. L3-4: No significant central canal or neural foraminal stenosis. L2-3: No significant central canal or neural foraminal stenosis. L1-2: No significant central canal or neural foraminal stenosis. The visualized prevertebral and posterior paraspinous soft  tissues are grossly unremarkable.     1.  No acute abnormality or abnormal enhancement in the lumbar spine. If there is strong clinical suspicion for discitis/osteomyelitis or epidural infectious process, short interval repeat imaging is recommended. 2.  Multilevel degenerative changes of the lumbar spine as described above.    Mr-shoulder-w/o Right    Addendum Date: 2/8/2018    I further discussed this case with Dr. Badillo at 4:20 PM on 2/8/2018. The patient is exquisitely tender over the medial clavicle. That area is not included on this study. There is however likely some edema and induration of the soft tissues surrounding the mid and medial clavicle at the edge of the images. Review of CT scan of the neck and chest from the same date demonstrates induration of the soft tissues surrounding the right medial clavicle as well as centered in the area of the sternoclavicular joint. These findings would be suspicious for septic arthritis/osteomyelitis of the right sternoclavicular joint.    Result Date: 2/8/2018 2/7/2018 7:03 PM HISTORY/REASON FOR EXAM: Right-sided shoulder pain TECHNIQUE/EXAM DESCRIPTION: MRI of the RIGHT shoulder without contrast. Using a [employer] Signa 1.5 Aubrie MRI scanner, T1 sagittal, fast spin-echo T2 fat-suppressed oblique coronal, sagittal, and axial and intermediate fast spin-echo oblique coronal images were obtained. COMPARISON: None. FINDINGS: Osseous acromial outlet: The acromion demonstrates a curved undersurface. There is no lateral downsloping. There is no evidence of an inferiorly directed subacromial enthesophyte. There is mild degenerative change of the acromioclavicular joint. There are no inferiorly directed osteophytes. There is minimal fluid seen within the subacromial/subdeltoid bursa. Rotator cuff: There is tendinopathy involving the supraspinatus tendon with mild fraying of the bursal surface fibers. No evidence of full-thickness tear or retraction. Glenoid labrum: There is no  definite superior labral tear or evidence of disruption of the biceps anchor. There is a multiloculated fluid signal focus seen anterior to the subscapularis tendon which measures 2.4 x 1.4 x 1.1 cm in size. This extends towards the anterior/superior labrum. The anterior/inferior and posterior/inferior glenoid labrum are intact. Osseous structures/Cartilaginous surfaces: No evidence of marrow edema.  Cartilaginous surfaces are well maintained. Miscellaneous: No significant joint effusion. The long head of the biceps tendon is appropriately situated within the bicipital groove.     1.  Mild tendinopathy of the supraspinatus tendon with very mild fraying of the bursal surface fibers. No evidence of full-thickness tear or retraction. 2.  No definite tear of the glenoid labrum. There is however a multiloculated fluid signal focus present anterior to the subscapularis tendon which extends towards the anterior/superior labrum. This may represent a ganglion cyst but can also potential he  represent a paralabral cyst related to nonvisualized superior labral tear. 3.  Mild degenerative change of the acromioclavicular joint.    Mr-thoracic Spine-with & W/o    Result Date: 2/8/2018 2/7/2018 7:03 PM HISTORY/REASON FOR EXAM: Atraumatic back and neck pain. TECHNIQUE/EXAM DESCRIPTION: MRI of the thoracic spine without and with contrast. The study was performed on a [facility] Signa 1.5 Aubrie MRI scanner. T1 sagittal, T1 postcontrast fat-suppressed sagittal, T2 sagittal, and T2 axial images were obtained of the thoracic spine. 15 mL Omniscan contrast were administered intravenously. COMPARISON:  None FINDINGS: The thoracic vertebral bodies have a normal height and alignment. The thoracic disks have a normal appearance and signal intensity. The thoracic cord has a normal caliber, course and signal intensity. There is no evidence of abnormal enhancement in the thoracic cord or spine. There is no evidence of disk extrusion, cord  compression, central canal stenosis, or neural foraminal stenosis. There is atelectasis and/or consolidation in the visualized portion of the right lung base.     1.  No acute abnormality or abnormal enhancement in the thoracic spine. If there is strong clinical suspicion for discitis/osteomyelitis or epidural infectious process, short interval repeat imaging is recommended. 2.  Possible right lung base pneumonitis.    Echocardiogram Comp W/o Cont    Result Date: 2/9/2018  Transthoracic Echo Report Echocardiography Laboratory CONCLUSIONS No prior study is available for comparison. Normal left ventricular chamber size. Left ventricular ejection fraction is visually estimated to be 60%. No significant valve abnormalities. Estimated right ventricular systolic pressure is 45 mmHg. auto-interpretation LV EF:  60    % FINDINGS Left Ventricle Normal left ventricular chamber size. Normal left ventricular wall thickness. Normal left ventricular systolic function. Left ventricular ejection fraction is visually estimated to be 60%. Normal regional wall motion. Normal diastolic function. Right Ventricle The right ventricle was normal in size and function. Right Atrium The right atrium is normal in size. Normal inferior vena cava size and inspiratory collapse. Left Atrium The left atrium is normal in size. Left atrial volume index is 21 mL/sq m. Mitral Valve Structurally normal mitral valve without significant stenosis or regurgitation. Aortic Valve Structurally normal aortic valve without significant stenosis or regurgitation. Tricuspid Valve Structurally normal tricuspid valve. No tricuspid stenosis. Trace tricuspid regurgitation. Estimated right ventricular systolic pressure is 45 mmHg. Pulmonic Valve Structurally normal pulmonic valve without significant stenosis or regurgitation. Pericardium Normal pericardium without effusion. Aorta The aortic root is normal. Ascending aorta diameter is 2.9 cm. Vinnie Naqvi MD  (Electronically Signed) Final Date:     09 February 2018                 14:50    Ct-cta Chest Pulmonary Artery W/ Recons    Result Date: 2/7/2018 2/7/2018 5:01 PM HISTORY/REASON FOR EXAM:  Chest pain. TECHNIQUE/EXAM DESCRIPTION: CT angiogram scan for pulmonary embolism with contrast, with reconstructions. 1.25 mm helical sections were obtained from the lung apices through the lung bases following the rapid bolus administration of 80 mL of Omnipaque 350 nonionic contrast. Thin-section overlapping reconstruction interval was utilized. Coronal reconstructions were generated from the axial data. MIP post processing was performed and utilized for the interpretation. Low dose optimization technique was utilized for this CT exam including automated exposure control and adjustment of the mA and/or kV according to patient size. COMPARISON: None FINDINGS: Pulmonary Embolism: No. Main Pulmonary Arteries: No. Segmental branches: No. Subsegmental branches: No. Additional Comments: The heart is normal in size and configuration. Lungs: There is minimal atelectasis or pneumonia in the posterior aspect of the right lower lobe and minimally in the anterior inferior aspect of the lingula. The remainder of the lung parenchyma is clear. Pleura: No pleural effusion. Nodes: No enlarged lymph nodes. Additional findings: There is mild enlargement of both adrenal glands. No hydronephrosis is visualized     1.  No evidence of pulmonary embolism. 2.  Minimal atelectasis or pneumonia in the right lower lobe and in the lingula. 3.  Mild bilateral adrenal gland enlargement. Significance unknown.       Micro:  Results     Procedure Component Value Units Date/Time    BLOOD CULTURE [[id number]] Collected:  02/09/18 1641    Order Status:  Completed Specimen:  Blood from Peripheral Updated:  02/14/18 1900     Significant Indicator NEG     Source BLD     Site PERIPHERAL     Blood Culture No growth after 5 days of incubation.    Narrative:       Per  "Hospital Policy: Only change Specimen Src: to \"Line\" if  specified by physician order.    BLOOD CULTURE [[id number]] Collected:  02/13/18 0142    Order Status:  Completed Specimen:  Blood from Peripheral Updated:  02/14/18 0904     Significant Indicator NEG     Source BLD     Site PERIPHERAL     Blood Culture --     No Growth    Note: Blood cultures are incubated for 5 days and  are monitored continuously.Positive blood cultures  are called to the RN and reported as soon as  they are identified.      Narrative:       Per Hospital Policy: Only change Specimen Src: to \"Line\" if  specified by physician order.    BLOOD CULTURE [[id number]] Collected:  02/13/18 0142    Order Status:  Completed Specimen:  Blood from Peripheral Updated:  02/14/18 0904     Significant Indicator NEG     Source BLD     Site PERIPHERAL     Blood Culture --     No Growth    Note: Blood cultures are incubated for 5 days and  are monitored continuously.Positive blood cultures  are called to the RN and reported as soon as  they are identified.      Narrative:       Per Hospital Policy: Only change Specimen Src: to \"Line\" if  specified by physician order.    CULTURE WOUND W/ GRAM STAIN [[id number]]  (Abnormal)  (Susceptibility) Collected:  02/10/18 1235    Order Status:  Completed Specimen:  Wound Updated:  02/13/18 1123     Significant Indicator POS (POS)     Source WND     Site Neck Abscess     Culture Result Wound -- (A)     Gram Stain Result --     Few WBCs.  Rare Gram positive cocci.       Culture Result Wound -- (A)     Staphylococcus aureus  Light growth      Culture & Susceptibility     STAPHYLOCOCCUS AUREUS     Antibiotic Sensitivity Microscan Unit Status    Ampicillin/sulbactam Sensitive <=8/4 mcg/mL Final    Clindamycin Sensitive <=0.5 mcg/mL Final    Daptomycin Sensitive 1 mcg/mL Final    Erythromycin Sensitive <=0.5 mcg/mL Final    Moxifloxacin Sensitive <=0.5 mcg/mL Final    Oxacillin Sensitive <=0.25 mcg/mL Final    Tetracycline " "Sensitive <=4 mcg/mL Final    Trimeth/Sulfa Sensitive <=0.5/9.5 mcg/mL Final    Vancomycin Sensitive 2 mcg/mL Final                       ANAEROBIC CULTURE [[id number]] Collected:  02/10/18 1235    Order Status:  Completed Specimen:  Wound Updated:  02/13/18 1123     Significant Indicator NEG     Source WND     Site Neck Abscess     Anaerobic Culture, Culture Res No Anaerobes isolated.    BLOOD CULTURE [[id number]]  (Abnormal) Collected:  02/09/18 1641    Order Status:  Completed Specimen:  Blood from Peripheral Updated:  02/13/18 0951     Significant Indicator POS (POS)     Source BLD     Site PERIPHERAL     Blood Culture -- (A)     Growth detected by Bactec instrument.  02/11/2018  13:33  Staphylococcus aureus (methicillin sensitive)  detected by PCR.       Blood Culture -- (A)     Staphylococcus aureus  See previous culture for sensitivity report.      Narrative:       CALL  [place]  161 tel. [phone],  CALLED  161 tel. [phone] 02/11/2018, 13:33, RB PERF. RESULTS CALLED  TO:[id number],RN;[facility]  Per Hospital Policy: Only change Specimen Src: to \"Line\" if  specified by physician order.    BLOOD CULTURE [[id number]] Collected:  02/12/18 1644    Order Status:  Completed Specimen:  Blood from Peripheral Updated:  02/13/18 0910     Significant Indicator NEG     Source BLD     Site PERIPHERAL     Blood Culture --     No Growth    Note: Blood cultures are incubated for 5 days and  are monitored continuously.Positive blood cultures  are called to the RN and reported as soon as  they are identified.      Narrative:       Per Hospital Policy: Only change Specimen Src: to \"Line\" if  specified by physician order.    BLOOD CULTURE [[id number]] Collected:  02/12/18 1507    Order Status:  Completed Specimen:  Blood from Peripheral Updated:  02/13/18 0910     Significant Indicator NEG     Source BLD     Site PERIPHERAL     Blood Culture --     No Growth    Note: Blood cultures are incubated for 5 days and  are monitored " "continuously.Positive blood cultures  are called to the RN and reported as soon as  they are identified.      Narrative:       Per Hospital Policy: Only change Specimen Src: to \"Line\" if  specified by physician order.    GRAM STAIN [[id number]] Collected:  02/10/18 1235    Order Status:  Completed Specimen:  Wound Updated:  02/11/18 0851     Significant Indicator .     Source WND     Site Neck Abscess     Gram Stain Result --     Few WBCs.  Rare Gram positive cocci.      BLOOD CULTURE [[id number]]  (Abnormal)  (Susceptibility) Collected:  02/07/18 1848    Order Status:  Completed Specimen:  Blood from Peripheral Updated:  02/10/18 0842     Significant Indicator POS (POS)     Source BLD     Site PERIPHERAL     Blood Culture -- (A)     Growth detected by Bactec instrument.  02/08/2018  08:43  Staphylococcus aureus (methicillin sensitive)  detected by PCR.       Blood Culture Staphylococcus aureus (A)    Narrative:       CALL  [place]  171 tel. [phone],  CALLED  171 tel. [phone] 02/08/2018, 08:49, RB PERF. RESULTS CALLED  TO:[id number] and DILAN  2 of 2 blood culture x2  Sites order. Per Hospital Policy:  Only change Specimen Src: to \"Line\" if specified by physician  order.    Culture & Susceptibility     STAPHYLOCOCCUS AUREUS     Antibiotic Sensitivity Microscan Unit Status    Ampicillin/sulbactam Sensitive <=8/4 mcg/mL Final    Clindamycin Sensitive <=0.5 mcg/mL Final    Daptomycin Sensitive <=0.5 mcg/mL Final    Erythromycin Sensitive <=0.5 mcg/mL Final    Moxifloxacin Sensitive <=0.5 mcg/mL Final    Oxacillin Sensitive <=0.25 mcg/mL Final    Tetracycline Sensitive <=4 mcg/mL Final    Trimeth/Sulfa Sensitive <=0.5/9.5 mcg/mL Final    Vancomycin Sensitive 1 mcg/mL Final                       BLOOD CULTURE [[id number]]  (Abnormal) Collected:  02/07/18 1848    Order Status:  Completed Specimen:  Blood from Peripheral Updated:  02/10/18 0842     Significant Indicator POS (POS)     Source BLD     Site PERIPHERAL     Blood " "Culture Growth detected by Bactec instrument.  02/08/2018  08:50 (A)     Blood Culture -- (A)     Staphylococcus aureus  See previous culture for sensitivity report.      Narrative:       CALL  [place]  171 tel. [phone],  CALLED  171 tel. [phone] 02/08/2018, 08:50, RB PERF. RESULTS CALLED  TO:[id number] and DILAN  1 of 2 for Blood Culture x 2 sites order. Per Hospital  Policy: Only change Specimen Src: to \"Line\" if specified by  physician order.          Assessment:  Active Hospital Problems    Diagnosis   • *Sepsis (CMS-HCC) [A41.9]   • Hypokalemia [E87.6]   • Lactic acidosis [E87.2]   • Neck pain [M54.2]   • Hyponatremia [E87.1]   • CAP (community acquired pneumonia) [J18.9]       Plan:  MSSA sepsis  Fever resolved  +leukocytosis  Blood cultures + 2/7 and 2/9  Repeat blood cultures 2/12 neg  TTE is negative from 2/9/28- WES neg  MRI T spine neg  MRI shoulder no annika abscess/XRAY with gas-Ortho to eval  Anticipate 4 weeks IV abx from date of neg blood cxs  Stop date 3/12/2018-dapto 6mg/kg IV daily    Leukocytosis, increased  Multifactorial  Afebrile and repeat cxs neg  No new sites infection  Encouraged OOB and IS  Monitor    Right neck abscess  Underwent I&D on 2/10/2018  Cultures +MSSA  Wound care  Abx as above    Pneumonia  MSSA  Abx as above  Monitor closely for complications    Discussed with internal medicine Dr Davalos  "

## 2018-02-16 NOTE — PROGRESS NOTES
"Progress Note:  2/16/2018, 8:12 AM    S: Pt feeling \"much better\", no pain currently. Afebrile. Two most recent blood cultures remain negative.    O:  Blood pressure 115/80, pulse 74, temperature 37.3 °C (99.2 °F), resp. rate 16, height 1.549 m (5' 1\"), weight 66.4 kg (146 lb 6.2 oz), SpO2 95 %, not currently breastfeeding.    NAD  Breathing well  No palpable chest wall tenderness over sternum or SC joints, appropriate incisional pain  No erythema or fluctuance    A:   Active Hospital Problems    Diagnosis   • Atrial fibrillation with RVR (CMS-[facility]) [I48.91]     Priority: High   • Abscess or cellulitis, neck [L03.221, L02.11]     Priority: High   • Sepsis (CMS-[facility]) [A41.9]     Priority: High   • Right shoulder pain [M25.511]   • Hypokalemia [E87.6]   • Lactic acidosis [E87.2]   • Hyponatremia [E87.1]   • CAP (community acquired pneumonia) [J18.9]         P:   No indication for surgery for SC joint currently  Continue abx  Signing off. Please contact me with questions/concerns/updates    Gavin Gong M.D.  [facility]  [phone]    "

## 2018-02-16 NOTE — PROGRESS NOTES
[facility] Hospitalist Progress Note    Date of Service: 2/15/2018    Chief Complaint  57 y.o. female admitted 2018 with neck pain; found cellulitis and deep neck infection with MSSA bacteremia     Interval Problem Update  Pt seen and examined, no overnight events, afebrile, no nausea or vomiting. Xray of right shoulder showed air, ortho will see pt appreciate rec.   ID and ENT following, appreciate rec.     Consultants/Specialty  ENT Dr. Perez  Surgery Dr. Gong  Cards Dr. Grace  ID Dr. Trent    Disposition  TBD         Review of Systems   Constitutional: Negative for chills and fever.   Respiratory: Negative for cough and shortness of breath.    Cardiovascular: Negative for chest pain and palpitations.   Gastrointestinal: Negative for abdominal pain, nausea and vomiting.   Genitourinary: Negative for dysuria.   Musculoskeletal: Positive for back pain and neck pain. Negative for myalgias.        Significant pain at surgical site   Neurological: Negative for dizziness and headaches.      Physical Exam  Laboratory/Imaging   Hemodynamics  Temp (24hrs), Av.7 °C (98.1 °F), Min:36 °C (96.8 °F), Max:37.2 °C (98.9 °F)   Temperature: 36.8 °C (98.3 °F)  Pulse  Av.3  Min: 68  Max: 180   Blood Pressure: 109/79      Respiratory      Respiration: 18, Pulse Oximetry: 98 %     Work Of Breathing / Effort: Mild  RUL Breath Sounds: Expiratory Wheezes, RML Breath Sounds: Diminished, RLL Breath Sounds: Diminished, STEFFEN Breath Sounds: Expiratory Wheezes, LLL Breath Sounds: Diminished    Fluids    Intake/Output Summary (Last 24 hours) at 02/15/18 1621  Last data filed at 02/15/18 0900   Gross per 24 hour   Intake             1030 ml   Output                0 ml   Net             1030 ml       Nutrition  Orders Placed This Encounter   Procedures   • DIET ORDER     Standing Status:   Standing     Number of Occurrences:   1     Order Specific Question:   Diet:     Answer:   Regular [1]     Physical Exam   Constitutional: She is  oriented to person, place, and time. She appears well-developed and well-nourished. Distressed: in pain.   HENT:   Head: Normocephalic and atraumatic.   Cardiovascular: Regular rhythm and normal heart sounds.    No murmur heard.  Pulmonary/Chest: Effort normal and breath sounds normal. No respiratory distress. She has no wheezes. She has no rales.   Abdominal: Soft. Bowel sounds are normal. She exhibits no distension. There is no tenderness.   Musculoskeletal: Normal range of motion. She exhibits no edema.   Neurological: She is alert and oriented to person, place, and time.   Skin: Skin is warm and dry. No erythema.   Penrose drain has been removed     Nursing note and vitals reviewed.      Recent Labs      02/13/18   0142  02/14/18   0218  02/15/18   0125   WBC  11.4*  13.0*  14.1*   RBC  3.61*  3.48*  3.50*   HEMOGLOBIN  12.1  11.7*  11.8*   HEMATOCRIT  32.9*  32.2*  32.4*   MCV  91.1  92.5  92.6   MCH  33.5*  33.6*  33.7*   MCHC  36.8*  36.3*  36.4*   RDW  45.8  47.5  47.7   PLATELETCT  290  345  425   MPV  9.3  9.5  9.3     Recent Labs      02/13/18   0143  02/14/18 0218  02/15/18   0126   SODIUM  135  134*  134*   POTASSIUM  3.8  3.9  3.8   CHLORIDE  98  98  100   CO2  27  27  26   GLUCOSE  126*  119*  112*   BUN  11  8  7*   CREATININE  0.42*  0.43*  0.46*   CALCIUM  8.9  8.5  8.9                      Assessment/Plan     * Sepsis (CMS-[facility])- (present on admission)   Assessment & Plan    This was sepsis (without associated acute organ dysfunction).   Hx of tooth infection in last 3 months  MRI of her cervical, thoracic, lumbar spine with and without contrast apparently did  NOT show any evidence of epidural abscesses  Resolved lactic acid   Likely 2/2 cellulitis/deep neck infection    Blood culture: MSSA on 2/7  Repeat blood culture on 2/9: MSSA  Day 5 cefazolin (started on 2/8/18)  Drainage on 2/10  Echo on 2/9 no signs of endocarditis;  WES:  Tdone on 2/13/18 neg for endocarditis               Atrial  fibrillation with RVR (CMS-HCC)   Assessment & Plan    New Dx  developed Afib with RVR on 2/10 up to 170s  Metoprolol did not work  Converted after an hour on amiodarone drip, cards following, switched to oral amio  TSH normal        Abscess or cellulitis, neck- (present on admission)   Assessment & Plan    With phlegmon, s/p I&D by Dr. Perez yesterday, still with drain in, not draining much  Continue IV antibiotics          CAP (community acquired pneumonia)- (present on admission)   Assessment & Plan    -vs atelectasis  -wbc normal, doing fine  -s/p abx        Hyponatremia- (present on admission)   Assessment & Plan    -mild        Lactic acidosis- (present on admission)   Assessment & Plan    2/2 sepsis; resolved        Hypokalemia- (present on admission)   Assessment & Plan    -replacing  -check am labs          Quality-Core Measures   Reviewed items::  Labs reviewed and Medications reviewed  Soliz catheter::  No Soliz  DVT prophylaxis pharmacological::  Heparin  Antibiotics:  Treating active infection/contamination beyond 24 hours perioperative coverage

## 2018-02-16 NOTE — PROGRESS NOTES
Pt is laying comfortably in bed with her headphones in watching a show on her phone. Pt exhibits no signs or symptoms of distress. Safety maintained. Call light within reach.

## 2018-02-16 NOTE — PROGRESS NOTES
Wound care and Picc care teaching completed with pt. Pt discharged home, tolerated daptomycin infusion.

## 2018-02-16 NOTE — CARE PLAN
Problem: Infection  Goal: Will remain free from infection  Outcome: PROGRESSING AS EXPECTED   Implement standard precautions and perform hand washing before and after patient contact. RN will follow protocols and necessary steps to minimize the spread of infection. RN educated pt and and any visitors on proper hand hygiene. Pt is on IV abx    Problem: Pain Management  Goal: Pain level will decrease to patient's comfort goal  Outcome: PROGRESSING AS EXPECTED  Pt pain level is decreasing. Pt is sore with movement as expected

## 2018-02-16 NOTE — PROGRESS NOTES
Pt discharged home with single lumen picc line right ac, patent, +blood return, site benign. Pt discharged home with daughter Yarely

## 2018-02-16 NOTE — ASSESSMENT & PLAN NOTE
Xray showed, air, seen by ortho probably post-surgical changes from her recent ENT surgery.   No need for any intervention.

## 2018-02-16 NOTE — DISCHARGE PLANNING
Medical Social Work    Pt discussed during IDT rounds. Pt still pending PICC placement and will also need first dose of dapto prior to DC. Requested [facility] cancel infusion scheduled for tomorrow.

## 2018-02-16 NOTE — DISCHARGE INSTRUCTIONS
Metoprolol tablets  What is this medicine?  METOPROLOL (me TOE proloni lole) is a beta-blocker. Beta-blockers reduce the workload on the heart and help it to beat more regularly. This medicine is used to treat high blood pressure and to prevent chest pain. It is also used to after a heart attack and to prevent an additional heart attack from occurring.  This medicine may be used for other purposes; ask your health care provider or pharmacist if you have questions.  COMMON BRAND NAME(S): Lopressor  What should I tell my health care provider before I take this medicine?  They need to know if you have any of these conditions:  -diabetes  -heart or vessel disease like slow heart rate, worsening heart failure, heart block, sick sinus syndrome or Raynaud's disease  -kidney disease  -liver disease  -lung or breathing disease, like asthma or emphysema  -pheochromocytoma  -thyroid disease  -an unusual or allergic reaction to metoprolol, other beta-blockers, medicines, foods, dyes, or preservatives  -pregnant or trying to get pregnant  -breast-feeding  How should I use this medicine?  Take this medicine by mouth with a drink of water. Follow the directions on the prescription label. Take this medicine immediately after meals. Take your doses at regular intervals. Do not take more medicine than directed. Do not stop taking this medicine suddenly. This could lead to serious heart-related effects.  Talk to your pediatrician regarding the use of this medicine in children. Special care may be needed.  Overdosage: If you think you have taken too much of this medicine contact a poison control center or emergency room at once.  NOTE: This medicine is only for you. Do not share this medicine with others.  What if I miss a dose?  If you miss a dose, take it as soon as you can. If it is almost time for your next dose, take only that dose. Do not take double or extra doses.  What may interact with this medicine?  Do not take this medicine  with any of the following medications:  -sotalol  This medicine may also interact with the following medications:  -clonidine  -digoxin  -dobutamine  -epinephrine  -isoproterenol  -medicine to control heart rhythm like quinidine, propafenone  -medicine for depression like monoamine oxidase (MAO) inhibitors, fluoxetine, and paroxetine  -medicine for high blood pressure like calcium channel blockers  -reserpine  This list may not describe all possible interactions. Give your health care provider a list of all the medicines, herbs, non-prescription drugs, or dietary supplements you use. Also tell them if you smoke, drink alcohol, or use illegal drugs. Some items may interact with your medicine.  What should I watch for while using this medicine?  Visit your doctor or health care professional for regular check ups. Contact your doctor right away if your symptoms worsen. Check your blood pressure and pulse rate regularly. Ask your health care professional what your blood pressure and pulse rate should be, and when you should contact them.  You may get drowsy or dizzy. Do not drive, use machinery, or do anything that needs mental alertness until you know how this medicine affects you. Do not sit or stand up quickly, especially if you are an older patient. This reduces the risk of dizzy or fainting spells. Contact your doctor if these symptoms continue. Alcohol may interfere with the effect of this medicine. Avoid alcoholic drinks.  What side effects may I notice from receiving this medicine?  Side effects that you should report to your doctor or health care professional as soon as possible:  -allergic reactions like skin rash, itching or hives  -cold or numb hands or feet  -depression  -difficulty breathing  -faint  -fever with sore throat  -irregular heartbeat, chest pain  -rapid weight gain  -swollen legs or ankles  Side effects that usually do not require medical attention (report to your doctor or health care  professional if they continue or are bothersome):  -anxiety or nervousness  -change in sex drive or performance  -dry skin  -headache  -nightmares or trouble sleeping  -short term memory loss  -stomach upset or diarrhea  -unusually tired  This list may not describe all possible side effects. Call your doctor for medical advice about side effects. You may report side effects to FDA at 3-090-JBL-1576.  Where should I keep my medicine?  Keep out of the reach of children.  Store at room temperature between 15 and 30 degrees C (59 and 86 degrees F). Throw away any unused medicine after the expiration date.  NOTE: This sheet is a summary. It may not cover all possible information. If you have questions about this medicine, talk to your doctor, pharmacist, or health care provider.  © 2014, [employer]. (2/27/2009 4:11:19 PM)  Amiodarone tablets  What is this medicine?  AMIODARONE (a OMER oh da torie) is an antiarrhythmic drug. It helps make your heart beat regularly. Because of the side effects caused by this medicine, it is only used when other medicines have not worked. It is usually used for heartbeat problems that may be life threatening.  This medicine may be used for other purposes; ask your health care provider or pharmacist if you have questions.  COMMON BRAND NAME(S): Cordarone, Pacerone  What should I tell my health care provider before I take this medicine?  They need to know if you have any of these conditions:  -liver disease  -lung disease  -other heart problems  -thyroid disease  -an unusual or allergic reaction to amiodarone, iodine, other medicines, foods, dyes, or preservatives  -pregnant or trying to get pregnant  -breast-feeding  How should I use this medicine?  Take this medicine by mouth with a glass of water. Follow the directions on the prescription label. You can take this medicine with or without food. However, you should always take it the same way each time. Take your doses at regular  intervals. Do not take your medicine more often than directed. Do not stop taking except on the advice of your doctor or health care professional.  A special MedGuide will be given to you by the pharmacist with each prescription and refill. Be sure to read this information carefully each time.  Talk to your pediatrician regarding the use of this medicine in children. Special care may be needed.  Overdosage: If you think you have taken too much of this medicine contact a poison control center or emergency room at once.  NOTE: This medicine is only for you. Do not share this medicine with others.  What if I miss a dose?  If you miss a dose, take it as soon as you can. If it is almost time for your next dose, take only that dose. Do not take double or extra doses.  What may interact with this medicine?  Do not take this medicine with any of the following medications:  -abarelix  -amoxapine  -apomorphine  -arsenic trioxide  -certain macrolide antibiotics  -certain quinolone antibiotics  -cisapride  -droperidol  -haloperidol  -hawthorn  -levomethadyl  -maprotiline  -medicines for malaria like chloroquine and halofantrine  -medicines for mental depression such as tricyclic antidepressants  -medicines to control heart rhythm like disopyramide, dofetilide, ibutilide, propafenone, and sotalol  -methadone  -mibefradil  -pentamidine  -phenothiazines like chlorpromazine, mesoridazine, and thioridazine  -pimozide  -probucol  -ranolazine  -sertindole  -vardenafil  -red yeast rice  -ziprasidone  This medicine may also interact with the following medications:  -beta blockers  -calcium channel blockers  -cholestyramine  -cimetidine  -clopidogrel  -cyclosporine  -dextromethorphan  -digoxin  -diuretics  -fentanyl  -flecainide  -fluindione  -general anesthetics  -grapefruit juice  -lidocaine  -loratadine  -medicines for fungal infections like ketoconazole, fluconazole, and itraconazole  -medicines for HIV, AIDS  -medicines for seizures  such as phenytoin  -medicines for thyroid problems  -medicines to lower cholesterol such as atorvastatin, cerivastatin, lovastatin, or simvastatin  -methotrexate  -procainamide  -quinidine  -rifampin, rifabutin, or rifapentine  -Reena's Wort  -trazodone  -warfarin  This list may not describe all possible interactions. Give your health care provider a list of all the medicines, herbs, non-prescription drugs, or dietary supplements you use. Also tell them if you smoke, drink alcohol, or use illegal drugs. Some items may interact with your medicine.  What should I watch for while using this medicine?  Your condition will be monitored closely when you first begin therapy. Often, this drug is first started in a hospital or other monitored health care setting. Once you are on maintenance therapy, visit your doctor or health care professional for regular checks on your progress. Because your condition and use of this medicine carry some risk, it is a good idea to carry an identification card, necklace or bracelet with details of your condition, medications, and doctor or health care professional.  You may get drowsy or dizzy. Do not drive, use machinery, or do anything that needs mental alertness until you know how this medicine affects you. Do not stand or sit up quickly, especially if you are an older patient. This reduces the risk of dizzy or fainting spells.  This medicine can make you more sensitive to the sun. Keep out of the sun. If you cannot avoid being in the sun, wear protective clothing and use sunscreen. Do not use sun lamps or tanning beds/booths.  You should have regular eye exams before and during treatment. Call your doctor if you have blurred vision, see halos, or your eyes become sensitive to light. Your eyes may get dry. It may be helpful to use a lubricating eye solution or artificial tears solution.  If you are going to have surgery or a procedure that requires contrast dyes, tell your doctor or  health care professional that you are taking this medicine.  What side effects may I notice from receiving this medicine?  Side effects that you should report to your doctor or health care professional as soon as possible:  -allergic reactions like skin rash, itching or hives, swelling of the face, lips, or tongue  -blue-gray coloring of the skin  -blurred vision, seeing blue green halos, increased sensitivity of the eyes to light  -breathing problems  -chest pain  -dark urine  -fast, irregular heartbeat  -feeling faint or light-headed  -intolerance to heat or cold  -nausea or vomiting  -pain and swelling of the scrotum  -pain, tingling, numbness in feet, hands  -redness, blistering, peeling or loosening of the skin, including inside the mouth  -spitting up blood  -stomach pain  -sweating  -unusual or uncontrolled movements of body  -unusually weak or tired  -weight gain or loss  -yellowing of the eyes or skin  Side effects that usually do not require medical attention (report to your doctor or health care professional if they continue or are bothersome):  -change in sex drive or performance  -constipation  -dizziness  -headache  -loss of appetite  -trouble sleeping  This list may not describe all possible side effects. Call your doctor for medical advice about side effects. You may report side effects to FDA at 0-271-FDA-7211.  Where should I keep my medicine?  Keep out of the reach of children.  Store at room temperature between 20 and 25 degrees C (68 and 77 degrees F). Protect from light. Keep container tightly closed. Throw away any unused medicine after the expiration date.  NOTE: This sheet is a summary. It may not cover all possible information. If you have questions about this medicine, talk to your doctor, pharmacist, or health care provider.  © 2014, [employer] Standard. (7/16/2010 2:08:28 PM)  Daptomycin injection  What is this medicine?  DAPTOMYCIN (DAP toe MYE sin) is a lipopeptide antibiotic. It is  used to treat certain kinds of bacterial infections. It will not work for colds, flu, or other viral infections.  This medicine may be used for other purposes; ask your health care provider or pharmacist if you have questions.  COMMON BRAND NAME(S): Cubicin  What should I tell my health care provider before I take this medicine?  They need to know if you have any of these conditions:  -kidney disease  -an unusual or allergic reaction to daptomycin, other medicines, foods, dyes, or preservatives  -pregnant or trying to get pregnant  -breast-feeding  How should I use this medicine?  This medicine is for infusion into a vein. It is usually given by a health care professional in a hospital or clinic setting.  If you get this medicine at home, you will be taught how to prepare and give this medicine. Use exactly as directed. Take your medicine at regular intervals. Do not take your medicine more often than directed. Take all of your medicine as directed even if you think you are better. Do not skip doses or stop your medicine early.  It is important that you put your used needles and syringes in a special sharps container. Do not put them in a trash can. If you do not have a sharps container, call your pharmacist or healthcare provider to get one.  Talk to your pediatrician regarding the use of this medicine in children. Special care may be needed.  Overdosage: If you think you have taken too much of this medicine contact a poison control center or emergency room at once.  NOTE: This medicine is only for you. Do not share this medicine with others.  What if I miss a dose?  If you miss a dose, take it as soon as you can. If it is almost time for your next dose, take only that dose. Do not take double or extra doses.  What may interact with this medicine?  -some antibiotics like tobramycin  This list may not describe all possible interactions. Give your health care provider a list of all the medicines, herbs,  non-prescription drugs, or dietary supplements you use. Also tell them if you smoke, drink alcohol, or use illegal drugs. Some items may interact with your medicine.  What should I watch for while using this medicine?  Your condition will be monitored carefully while you are receiving this medicine.  Do not treat diarrhea with over the counter products. Contact your doctor if you have diarrhea that lasts more than 2 days or if it is severe and watery.  What side effects may I notice from receiving this medicine?  Side effects that you should report to your doctor or health care professional as soon as possible:  -allergic reactions like skin rash, itching or hives, swelling of the face, lips, or tongue  -breathing problems  -fever, infection  -high or low blood pressure  -muscle pain  -numb or tingling pain  -trouble passing urine or change in the amount of urine  -unusually tired or weak  -vomiting  Side effects that usually do not require medical attention (report to your doctor or health care professional if they continue or are bothersome):  -constipation or diarrhea  -trouble sleeping  -headache  -nausea  -stomach upset  This list may not describe all possible side effects. Call your doctor for medical advice about side effects. You may report side effects to FDA at 7-639-FDA-4854.  Where should I keep my medicine?  Keep out of the reach of children.  If you are using this medicine at home, you will be instructed on how to store this medicine. Throw away any unused medicine after the expiration date on the label.  NOTE: This sheet is a summary. It may not cover all possible information. If you have questions about this medicine, talk to your doctor, pharmacist, or health care provider.  © 2014, [employer] Standard. (4/9/2009 5:56:10 PM)  Discharge Instructions    Discharged to home by car with relative. Discharged via wheelchair, hospital escort: Refused.  Special equipment needed: Not Applicable    Be sure  to schedule a follow-up appointment with your primary care doctor or any specialists as instructed.     Discharge Plan:   Diet Plan: Discussed  Activity Level: Discussed  Confirmed Follow up Appointment: Appointment Scheduled  Confirmed Symptoms Management: Discussed  Medication Reconciliation Updated: Yes  Influenza Vaccine Indication: Patient Refuses    I understand that a diet low in cholesterol, fat, and sodium is recommended for good health. Unless I have been given specific instructions below for another diet, I accept this instruction as my diet prescription.   Other diet: heart healthy    Special Instructions: Sepsis, Adult  Sepsis is a serious infection of your blood or tissues that affects your whole body. The infection that causes sepsis may be bacterial, viral, fungal, or parasitic. Sepsis may be life threatening. Sepsis can cause your blood pressure to drop. This may result in shock. Shock causes your central nervous system and your organs to stop working correctly.   RISK FACTORS  Sepsis can happen in anyone, but it is more likely to happen in people who have weakened immune systems.  SIGNS AND SYMPTOMS   Symptoms of sepsis can include:  · Fever or low body temperature (hypothermia).  · Rapid breathing (hyperventilation).  · Chills.  · Rapid heartbeat (tachycardia).  · Confusion or light-headedness.  · Trouble breathing.  · Urinating much less than usual.  · Cool, clammy skin or red, flushed skin.  · Other problems with the heart, kidneys, or brain.  DIAGNOSIS   Your health care provider will likely do tests to look for an infection, to see if the infection has spread to your blood, and to see how serious your condition is. Tests can include:  · Blood tests, including cultures of your blood.  · Cultures of other fluids from your body, such as:  ¨ Urine.  ¨ Pus from wounds.  ¨ Mucus coughed up from your lungs.  · Urine tests other than cultures.  · X-ray exams or other imaging tests.  TREATMENT    Treatment will begin with elimination of the source of infection. If your sepsis is likely caused by a bacterial or fungal infection, you will be given antibiotic or antifungal medicines.  You may also receive:  · Oxygen.  · Fluids through an IV tube.  · Medicines to increase your blood pressure.  · A machine to clean your blood (dialysis) if your kidneys fail.  · A machine to help you breathe if your lungs fail.  SEEK IMMEDIATE MEDICAL CARE IF:  You get an infection or develop any of the signs and symptoms of sepsis after surgery or a hospitalization.     This information is not intended to replace advice given to you by your health care provider. Make sure you discuss any questions you have with your health care provider.     Document Released: 09/15/2004 Document Revised: 05/03/2016 Document Reviewed: 08/25/2014  [employer] Interactive Patient Education ©2016 [employer]      · Is patient discharged on Warfarin / Coumadin?   No     Depression / Suicide Risk    As you are discharged from this [facility] facility, it is important to learn how to keep safe from harming yourself.    Recognize the warning signs:  · Abrupt changes in personality, positive or negative- including increase in energy   · Giving away possessions  · Change in eating patterns- significant weight changes-  positive or negative  · Change in sleeping patterns- unable to sleep or sleeping all the time   · Unwillingness or inability to communicate  · Depression  · Unusual sadness, discouragement and loneliness  · Talk of wanting to die  · Neglect of personal appearance   · Rebelliousness- reckless behavior  · Withdrawal from people/activities they love  · Confusion- inability to concentrate     If you or a loved one observes any of these behaviors or has concerns about self-harm, here's what you can do:  · Talk about it- your feelings and reasons for harming yourself  · Remove any means that you might use to hurt yourself (examples: pills,  rope, extension cords, firearm)  · Get professional help from the community (Mental Health, Substance Abuse, psychological counseling)  · Do not be alone:Call your Safe Contact- someone whom you trust who will be there for you.  · Call your local CRISIS HOTLINE 841-0463 or 968-697-7806  · Call your local Children's Mobile Crisis Response Team [place] (610) 890-7962 or www.TeleDNA  · Call the toll free National Suicide Prevention Hotlines   · National Suicide Prevention Lifeline 318-606-WYGE (3121)  · National Hope Line Network 800-SUICIDE (165-8722)

## 2018-02-16 NOTE — PROGRESS NOTES
Assumed care of pt. Bedside report received from night R.N. Pt denies chest pain or SOB. VSS. Pt resting comfortably sitting up at edge of bed. All needs met. Bed low and locked, call light in reach. Discussed POC.

## 2018-02-16 NOTE — PROGRESS NOTES
Consents confirmed, vessel patency confirmed with ultrasound. Risks and benefits of procedure explained to patent/family and education regarding central line associated bloodstream infection provided. Questions and concerns addressed.     PICC placed in RUE per MD order with ultrasound guidance. 4 Fr, single lumen PICC placed in the basilic vein after 1 attempt(s). 1% lidocaine injected intradermally, 21 matt micro introducer needle and modified Seldinger technique used. 36 cm catheter inserted with good blood return. Each lumen flushed without resistance with 10ml 0.9% normal saline. PICC line secured with STAT LOCK. Biopatch and Tegaderm applied.     CXR ordered, PICC placement confirmation will be provided by the Radiologist via interpretation of the follow up chest x-ray to confirm tip location. Pt tolerated procedure well. Pt condition relayed to unit RN or ordering physician via this post procedure note in the EMR.    [employer] Power PICC Ref # [id number], Lot # [id number]

## 2018-02-17 ENCOUNTER — OUTPATIENT INFUSION SERVICES (OUTPATIENT)
Dept: ONCOLOGY | Facility: MEDICAL CENTER | Age: 58
End: 2018-02-17
Attending: INTERNAL MEDICINE
Payer: COMMERCIAL

## 2018-02-17 VITALS
OXYGEN SATURATION: 97 % | HEART RATE: 80 BPM | SYSTOLIC BLOOD PRESSURE: 129 MMHG | RESPIRATION RATE: 20 BRPM | HEIGHT: 60 IN | TEMPERATURE: 98.1 F | BODY MASS INDEX: 28.52 KG/M2 | DIASTOLIC BLOOD PRESSURE: 82 MMHG | WEIGHT: 145.28 LBS

## 2018-02-17 LAB
BACTERIA BLD CULT: NORMAL
BACTERIA BLD CULT: NORMAL
CK SERPL-CCNC: 31 U/L (ref 0–154)
SIGNIFICANT IND 70042: NORMAL
SIGNIFICANT IND 70042: NORMAL
SITE SITE: NORMAL
SITE SITE: NORMAL
SOURCE SOURCE: NORMAL
SOURCE SOURCE: NORMAL

## 2018-02-17 PROCEDURE — 96365 THER/PROPH/DIAG IV INF INIT: CPT

## 2018-02-17 PROCEDURE — 36592 COLLECT BLOOD FROM PICC: CPT

## 2018-02-17 PROCEDURE — 82550 ASSAY OF CK (CPK): CPT

## 2018-02-17 PROCEDURE — 700105 HCHG RX REV CODE 258: Performed by: INTERNAL MEDICINE

## 2018-02-17 PROCEDURE — 700111 HCHG RX REV CODE 636 W/ 250 OVERRIDE (IP): Performed by: INTERNAL MEDICINE

## 2018-02-17 RX ADMIN — DAPTOMYCIN 400 MG: 500 INJECTION, POWDER, LYOPHILIZED, FOR SOLUTION INTRAVENOUS at 15:56

## 2018-02-17 ASSESSMENT — PAIN SCALES - GENERAL: PAINLEVEL: NO PAIN

## 2018-02-17 NOTE — DISCHARGE SUMMARY
CHIEF COMPLAINT ON ADMISSION  Chief Complaint   Patient presents with   • Shoulder Pain       CODE STATUS  Prior    HPI & HOSPITAL COURSE  This is a 57 y.o. Female who was admitted on 2/7/18 after presenting to ER complaning of right shoulder pain.In ER she was found to be septic.  In ER patient underwent a CTA PE exam which did not show any evidence of pulmonary embolisms however did show evidence of minimal atelectasis versus pneumonitis in the right lower lobe and the lingula. However, given that the patient was tachycardic in the 150s and there was concerned for a possible epidural abscess versus osteomyelitis, and given to the degree of her leukocytosis a stat MRI of her cervical lumbar and thoracic spine with and without contrast were done , no apparent osteomyelitis or discitis were noted however patient apparently had abnormality around her chest wall for concern about possible fasciitis. Ortho and surgery were consulted. Ortho evaluated pt but no need for orthopedic intervention needed. She also had a act soft tissue neck showing neck abscess. ENT was consulted and pt had a I&D done on 2/10/18. Thoracic surgery also evaluated pt but no need for intervention from their stand pont.   ID also was consulted her blood culture grew MSSA, she was started on ancef. Pt also had a WES which was negative for endocarditis, per infection disease pt will need to be on 4 weeks of IV abx, and can be discharged on daptomycin with a stop date of 3/12/18  During her time here she also had A.fibb with RVR, and cardiology was consulted, she was started on amiodarone drip and she converted back to sinus in an hour after starting amiodarone, and was switched on oral amiodarone and will need to follow up as outpt with cardiology.  Pt otherwise has now been hemodynamically stable, per ENT, ID and surgery stand point can be discharged, so she will be discharged to day on outpt IV infusion of daptomycin.       The patient met  2-midnight criteria for an inpatient stay at the time of discharge.    DISCHARGE PROBLEM LIST  Principal Problem:    Sepsis (CMS-[facility]) POA: Yes  Active Problems:    Abscess or cellulitis, neck POA: Yes    Atrial fibrillation with RVR (CMS-[facility]) POA: No    Hypokalemia POA: Yes    Lactic acidosis POA: Yes    Hyponatremia POA: Yes    CAP (community acquired pneumonia) POA: Yes    Right shoulder pain POA: Unknown      FOLLOW UP  Future Appointments  Date Time Provider Department Center   2/17/2018 3:30 PM RN 2 [facility]   2/18/2018 3:00 PM RN 8 [facility]   2/19/2018 4:00 PM RN 5 [facility]   2/20/2018 5:00 PM RN 4 [facility]   2/21/2018 4:00 PM RN 8 [facility]   2/22/2018 4:00 PM RN 5 [facility]   2/23/2018 4:00 PM RN 5 [facility]   2/24/2018 5:00 PM RN 3 [facility]   2/25/2018 4:00 PM RN 2 [facility]   2/26/2018 4:30 PM RN 7 [facility]   2/27/2018 3:30 PM RN 2 [facility]   2/28/2018 4:00 PM RN 5 [facility]   3/1/2018 3:30 PM RN 1 [facility]   3/2/2018 3:30 PM RN 7 [facility]   3/3/2018 4:00 PM RN 6 [facility]   3/4/2018 5:00 PM RN 4 [facility]   3/5/2018 3:30 PM RN 1 [facility]   3/6/2018 3:30 PM RN 2 [facility]   3/7/2018 2:40 PM JEREMY Yu [facility]   3/7/2018 3:30 PM RN 2 [facility]   3/8/2018 2:00 PM INFUSION QUICK INJECT [facility]   3/9/2018 3:30 PM RN 7 [facility]   3/10/2018 3:30 PM RN 2 [facility]   3/11/2018 3:30 PM RN 2 [facility]   3/12/2018 3:30 PM RN 2 [facility]   3/14/2018 2:00 PM CASSIE Garcia [facility] None     Millie Perez M.D.  [address] [place] [id number]  [phone]    Go on 2/22/2018  Please arrive at 8:30 am for your appointment. Thank you.      MEDICATIONS ON DISCHARGE   Urszula Persaud   Home Medication Instructions HENRIETTA:[id number]    Printed on:02/16/18 5564   Medication Information                      amiodarone (CORDARONE) 200 MG Tab  Take 1 Tab  by mouth 2 Times a Day.             cyclobenzaprine (FLEXERIL) 10 MG Tab  Take 1 Tab by mouth 3 times a day as needed.             metoprolol (LOPRESSOR) 25 MG Tab  Take 1 Tab by mouth 2 Times a Day.             NS SOLN 50 mL with DAPTOmycin 500 MG SOLR 400 mg  400 mg by Intravenous route Once for 1 dose.             oxyCODONE immediate-release (ROXICODONE) 5 MG Tab  Take 1 Tab by mouth every 6 hours as needed for up to 3 days.                 DIET  No orders of the defined types were placed in this encounter.      ACTIVITY  As tolerated.        CONSULTATIONS  Thoracic surgery: Dr. Gong  Ortho: Dr. Conway   Infection disease: Dr. Trent  ENT: Dr. Perez    PROCEDURES  I&D of neck abscess     LABORATORY  Lab Results   Component Value Date/Time    SODIUM 135 02/16/2018 02:30 AM    POTASSIUM 4.0 02/16/2018 02:30 AM    CHLORIDE 100 02/16/2018 02:30 AM    CO2 26 02/16/2018 02:30 AM    GLUCOSE 123 (H) 02/16/2018 02:30 AM    BUN 9 02/16/2018 02:30 AM    CREATININE 0.49 (L) 02/16/2018 02:30 AM        Lab Results   Component Value Date/Time    WBC 13.2 (H) 02/16/2018 02:30 AM    HEMOGLOBIN 11.4 (L) 02/16/2018 02:30 AM    HEMATOCRIT 32.5 (L) 02/16/2018 02:30 AM    PLATELETCT 337 02/16/2018 02:30 AM        Total time of the discharge process exceeds 45 minutes

## 2018-02-18 ENCOUNTER — OUTPATIENT INFUSION SERVICES (OUTPATIENT)
Dept: ONCOLOGY | Facility: MEDICAL CENTER | Age: 58
End: 2018-02-18
Attending: INTERNAL MEDICINE
Payer: COMMERCIAL

## 2018-02-18 VITALS
TEMPERATURE: 99 F | SYSTOLIC BLOOD PRESSURE: 114 MMHG | RESPIRATION RATE: 16 BRPM | OXYGEN SATURATION: 96 % | HEART RATE: 81 BPM | DIASTOLIC BLOOD PRESSURE: 74 MMHG

## 2018-02-18 LAB
BACTERIA BLD CULT: NORMAL
BACTERIA BLD CULT: NORMAL
SIGNIFICANT IND 70042: NORMAL
SIGNIFICANT IND 70042: NORMAL
SITE SITE: NORMAL
SITE SITE: NORMAL
SOURCE SOURCE: NORMAL
SOURCE SOURCE: NORMAL

## 2018-02-18 PROCEDURE — 700111 HCHG RX REV CODE 636 W/ 250 OVERRIDE (IP): Performed by: INTERNAL MEDICINE

## 2018-02-18 PROCEDURE — 96365 THER/PROPH/DIAG IV INF INIT: CPT

## 2018-02-18 PROCEDURE — 700105 HCHG RX REV CODE 258: Performed by: INTERNAL MEDICINE

## 2018-02-18 RX ADMIN — DAPTOMYCIN 400 MG: 500 INJECTION, POWDER, LYOPHILIZED, FOR SOLUTION INTRAVENOUS at 13:15

## 2018-02-18 ASSESSMENT — PAIN SCALES - GENERAL: PAINLEVEL: NO PAIN

## 2018-02-18 NOTE — PROGRESS NOTES
Patient arrived ambulatory to the [facility] for Daptomycin with daughter. Reviewed vital signs, labs, and physician order. Patient denies S&S of pain or GI upset. Patient provided handout about Daptomycin, reviewed PICC line care at home, answered question. Patient arrives with  PICC to [facility], visualized brisk blood return, obtained baseline CPK level per protocol. Daptomycin administered, no adverse reaction observed. PICC line flushed per protocol, 4X4 gauze and mesh sleeve placed for protection. Confirmed upcoming appointment date and time with patient. Patient left the [facility] ambulatory in no sign of distress.

## 2018-02-18 NOTE — PROGRESS NOTES
Patient arrived ambulatory to the hospitals for Daptomycin. Reviewed vital signs, labs, and physician order. Patient denies muscle cramping or GI distress. Pt arrives with SL PICC to [facility], visualized brisk blood return. Daptomycin administered, no adverse reaction observed. PICC line flushed per protocol, 4X4 gauze and mesh sleeve placed for protection. Confirmed upcoming appointment date and time with patient. Patient left the [facility] ambulatory in no sign of distress.

## 2018-02-19 ENCOUNTER — OUTPATIENT INFUSION SERVICES (OUTPATIENT)
Dept: ONCOLOGY | Facility: MEDICAL CENTER | Age: 58
End: 2018-02-19
Attending: INTERNAL MEDICINE
Payer: COMMERCIAL

## 2018-02-19 VITALS
HEIGHT: 60 IN | BODY MASS INDEX: 28.57 KG/M2 | SYSTOLIC BLOOD PRESSURE: 117 MMHG | TEMPERATURE: 98.9 F | OXYGEN SATURATION: 96 % | HEART RATE: 78 BPM | WEIGHT: 145.5 LBS | DIASTOLIC BLOOD PRESSURE: 81 MMHG | RESPIRATION RATE: 18 BRPM

## 2018-02-19 DIAGNOSIS — A41.01 METHICILLIN SUSCEPTIBLE STAPHYLOCOCCUS AUREUS SEPTICEMIA (HCC): ICD-10-CM

## 2018-02-19 LAB
ALBUMIN SERPL BCP-MCNC: 4 G/DL (ref 3.2–4.9)
ALP SERPL-CCNC: 176 U/L (ref 30–99)
ALT SERPL-CCNC: 59 U/L (ref 2–50)
AST SERPL-CCNC: 27 U/L (ref 12–45)
BASOPHILS # BLD AUTO: 0.6 % (ref 0–1.8)
BASOPHILS # BLD: 0.08 K/UL (ref 0–0.12)
BILIRUB CONJ SERPL-MCNC: 0.4 MG/DL (ref 0.1–0.5)
BILIRUB INDIRECT SERPL-MCNC: 0.7 MG/DL (ref 0–1)
BILIRUB SERPL-MCNC: 1.1 MG/DL (ref 0.1–1.5)
BUN SERPL-MCNC: 11 MG/DL (ref 8–22)
CALCIUM SERPL-MCNC: 9.5 MG/DL (ref 8.5–10.5)
CHLORIDE SERPL-SCNC: 100 MMOL/L (ref 96–112)
CK SERPL-CCNC: 17 U/L (ref 0–154)
CO2 SERPL-SCNC: 24 MMOL/L (ref 20–33)
CREAT SERPL-MCNC: 0.44 MG/DL (ref 0.5–1.4)
EOSINOPHIL # BLD AUTO: 0.27 K/UL (ref 0–0.51)
EOSINOPHIL NFR BLD: 2.1 % (ref 0–6.9)
ERYTHROCYTE [DISTWIDTH] IN BLOOD BY AUTOMATED COUNT: 51.6 FL (ref 35.9–50)
GLUCOSE SERPL-MCNC: 106 MG/DL (ref 65–99)
HCT VFR BLD AUTO: 35.8 % (ref 37–47)
HGB BLD-MCNC: 12.4 G/DL (ref 12–16)
IMM GRANULOCYTES # BLD AUTO: 0.27 K/UL (ref 0–0.11)
IMM GRANULOCYTES NFR BLD AUTO: 2.1 % (ref 0–0.9)
LYMPHOCYTES # BLD AUTO: 3.59 K/UL (ref 1–4.8)
LYMPHOCYTES NFR BLD: 27.9 % (ref 22–41)
MCH RBC QN AUTO: 33 PG (ref 27–33)
MCHC RBC AUTO-ENTMCNC: 34.6 G/DL (ref 33.6–35)
MCV RBC AUTO: 95.2 FL (ref 81.4–97.8)
MONOCYTES # BLD AUTO: 0.91 K/UL (ref 0–0.85)
MONOCYTES NFR BLD AUTO: 7.1 % (ref 0–13.4)
NEUTROPHILS # BLD AUTO: 7.76 K/UL (ref 2–7.15)
NEUTROPHILS NFR BLD: 60.2 % (ref 44–72)
NRBC # BLD AUTO: 0 K/UL
NRBC BLD-RTO: 0 /100 WBC
PHOSPHATE SERPL-MCNC: 3.5 MG/DL (ref 2.5–4.5)
PLATELET # BLD AUTO: 553 K/UL (ref 164–446)
PMV BLD AUTO: 8.7 FL (ref 9–12.9)
POTASSIUM SERPL-SCNC: 4.5 MMOL/L (ref 3.6–5.5)
PROT SERPL-MCNC: 7.8 G/DL (ref 6–8.2)
RBC # BLD AUTO: 3.76 M/UL (ref 4.2–5.4)
SODIUM SERPL-SCNC: 133 MMOL/L (ref 135–145)
WBC # BLD AUTO: 12.9 K/UL (ref 4.8–10.8)

## 2018-02-19 PROCEDURE — 700105 HCHG RX REV CODE 258: Performed by: INTERNAL MEDICINE

## 2018-02-19 PROCEDURE — 80069 RENAL FUNCTION PANEL: CPT

## 2018-02-19 PROCEDURE — 85025 COMPLETE CBC W/AUTO DIFF WBC: CPT

## 2018-02-19 PROCEDURE — 96366 THER/PROPH/DIAG IV INF ADDON: CPT

## 2018-02-19 PROCEDURE — 96365 THER/PROPH/DIAG IV INF INIT: CPT

## 2018-02-19 PROCEDURE — 80076 HEPATIC FUNCTION PANEL: CPT

## 2018-02-19 PROCEDURE — 36592 COLLECT BLOOD FROM PICC: CPT

## 2018-02-19 PROCEDURE — 82550 ASSAY OF CK (CPK): CPT

## 2018-02-19 PROCEDURE — 700111 HCHG RX REV CODE 636 W/ 250 OVERRIDE (IP): Performed by: INTERNAL MEDICINE

## 2018-02-19 RX ADMIN — DAPTOMYCIN 400 MG: 500 INJECTION, POWDER, LYOPHILIZED, FOR SOLUTION INTRAVENOUS at 16:37

## 2018-02-19 ASSESSMENT — PAIN SCALES - GENERAL: PAINLEVEL: 3=SLIGHT PAIN

## 2018-02-20 ENCOUNTER — OUTPATIENT INFUSION SERVICES (OUTPATIENT)
Dept: ONCOLOGY | Facility: MEDICAL CENTER | Age: 58
End: 2018-02-20
Attending: INTERNAL MEDICINE
Payer: COMMERCIAL

## 2018-02-20 VITALS
RESPIRATION RATE: 18 BRPM | SYSTOLIC BLOOD PRESSURE: 130 MMHG | TEMPERATURE: 98.5 F | HEART RATE: 84 BPM | OXYGEN SATURATION: 98 % | DIASTOLIC BLOOD PRESSURE: 76 MMHG

## 2018-02-20 PROCEDURE — 700105 HCHG RX REV CODE 258: Performed by: INTERNAL MEDICINE

## 2018-02-20 PROCEDURE — 700111 HCHG RX REV CODE 636 W/ 250 OVERRIDE (IP): Performed by: INTERNAL MEDICINE

## 2018-02-20 PROCEDURE — 96365 THER/PROPH/DIAG IV INF INIT: CPT

## 2018-02-20 RX ADMIN — DAPTOMYCIN 400 MG: 500 INJECTION, POWDER, LYOPHILIZED, FOR SOLUTION INTRAVENOUS at 16:58

## 2018-02-20 ASSESSMENT — PAIN SCALES - GENERAL: PAINLEVEL: 3=SLIGHT PAIN

## 2018-02-20 NOTE — PROGRESS NOTES
Patient presents for daily IV antibiotics.  PICC flushes well with blood drawn as ordered. Daptomycin infused as ordered, line flushed clear. PICC flushed per protocol and site secured. Patient returns tomorrow and released in no acute distress with her daughter.

## 2018-02-21 ENCOUNTER — OUTPATIENT INFUSION SERVICES (OUTPATIENT)
Dept: ONCOLOGY | Facility: MEDICAL CENTER | Age: 58
End: 2018-02-21
Attending: INTERNAL MEDICINE
Payer: COMMERCIAL

## 2018-02-21 VITALS
HEART RATE: 86 BPM | RESPIRATION RATE: 18 BRPM | DIASTOLIC BLOOD PRESSURE: 83 MMHG | SYSTOLIC BLOOD PRESSURE: 116 MMHG | OXYGEN SATURATION: 98 % | TEMPERATURE: 98 F

## 2018-02-21 PROCEDURE — 700105 HCHG RX REV CODE 258: Performed by: INTERNAL MEDICINE

## 2018-02-21 PROCEDURE — 700111 HCHG RX REV CODE 636 W/ 250 OVERRIDE (IP): Performed by: INTERNAL MEDICINE

## 2018-02-21 PROCEDURE — 96365 THER/PROPH/DIAG IV INF INIT: CPT

## 2018-02-21 RX ADMIN — DAPTOMYCIN 400 MG: 500 INJECTION, POWDER, LYOPHILIZED, FOR SOLUTION INTRAVENOUS at 15:52

## 2018-02-21 ASSESSMENT — PAIN SCALES - GENERAL: PAINLEVEL: 3=SLIGHT PAIN

## 2018-02-21 NOTE — PROGRESS NOTES
Patient presents for daily IV antibiotics.  PICC flushes well with brisk blood return. Daptomycin infused as ordered, line flushed clear. PICC flushed per protocol and site secured. Patient returns tomorrow and released in no acute distress.

## 2018-02-22 ENCOUNTER — OUTPATIENT INFUSION SERVICES (OUTPATIENT)
Dept: ONCOLOGY | Facility: MEDICAL CENTER | Age: 58
End: 2018-02-22
Attending: INTERNAL MEDICINE
Payer: COMMERCIAL

## 2018-02-22 VITALS
HEART RATE: 87 BPM | SYSTOLIC BLOOD PRESSURE: 116 MMHG | TEMPERATURE: 98.4 F | OXYGEN SATURATION: 98 % | RESPIRATION RATE: 18 BRPM | DIASTOLIC BLOOD PRESSURE: 75 MMHG

## 2018-02-22 PROCEDURE — 700105 HCHG RX REV CODE 258: Performed by: INTERNAL MEDICINE

## 2018-02-22 PROCEDURE — 700111 HCHG RX REV CODE 636 W/ 250 OVERRIDE (IP): Performed by: INTERNAL MEDICINE

## 2018-02-22 PROCEDURE — 96365 THER/PROPH/DIAG IV INF INIT: CPT

## 2018-02-22 RX ORDER — AMLODIPINE BESYLATE 5 MG/1
TABLET ORAL
COMMUNITY
Start: 2018-01-16 | End: 2018-02-22

## 2018-02-22 RX ADMIN — DAPTOMYCIN 400 MG: 500 INJECTION, POWDER, LYOPHILIZED, FOR SOLUTION INTRAVENOUS at 17:09

## 2018-02-22 ASSESSMENT — PAIN SCALES - GENERAL: PAINLEVEL: 2=MINIMAL-SLIGHT

## 2018-02-22 NOTE — PROGRESS NOTES
Pt arrived to IS, ambulatory, for Dapto infusion. Pt voices no complaints. PICC line flushed per policy, positive blood return noted. Dapto infused with no s/sx of adverse reaction. PICC line flushed per policy. Pt left IS with no s/sx of distress. Follow up appointment confirmed.

## 2018-02-22 NOTE — DOCUMENTATION QUERY
DOCUMENTATION QUERY    PROVIDERS: Please select “Cosign w/ note” to reply to query.    To better represent the severity of illness of your patient, please review the following information and exercise your independent professional judgment in responding to this query.     Following the patient’s admission, atrial fibrillation with rapid ventricular response in the setting of staph aureus sepsis is documented in Gavin Grace’s Consult Note of 2/10/18. Per coding guidelines, the clinical indicator for severe sepsis is an associated organ failure/organ dysfunction/shock documented as being DUE to the sepsis.    Based upon the clinical findings, risk factors, and treatment, can the diagnosis of sepsis and any associated relationship(s) be further specified?    • Sepsis with related organ failure/dysfunction (Severe Sepsis - please specify organ dysfunction/failure/shock)  • Sepsis without related organ failure/dysfunction (No severe sepsis)  • Other explanation of clinical findings (Please document)  • Unable to determine      The medical record reflects the following:   Clinical Findings FROM CONSULT NOTE 2/10/18 (GAVIN GRACE)  ASSESSMENT:  1. Atrial fibrillation with rapid ventricular response in the setting of   Staph aureus sepsis. Patient is being appropriately treated with antibiotics and has been seen by infectious disease. She has evidence of infection in the right shoulder area we would reccomend transesophageal echocardiogram to see if there is any vegetation. This can be done over the next a day or 2.  2. Atrial fibrillation with rapid ventricular response, amiodarone has been started appropriately. Switch subQ heparin to IV heparin, IV metoprolol P.r.n. Check TSH.  3. Hypertension.     Treatment Sepsis protocol, IV boluses, IV antibiotic therapy   Risk Factors    Location within medical record CONSULT NOTE 2/10/18 (GAVIN GRACE)       Thank you,   Mamie Ramos, CRC, CCA  Coding Reimbursement  Specialist 1

## 2018-02-23 ENCOUNTER — OUTPATIENT INFUSION SERVICES (OUTPATIENT)
Dept: ONCOLOGY | Facility: MEDICAL CENTER | Age: 58
End: 2018-02-23
Attending: INTERNAL MEDICINE
Payer: COMMERCIAL

## 2018-02-23 VITALS
SYSTOLIC BLOOD PRESSURE: 126 MMHG | TEMPERATURE: 97.7 F | DIASTOLIC BLOOD PRESSURE: 86 MMHG | BODY MASS INDEX: 28.38 KG/M2 | HEART RATE: 78 BPM | RESPIRATION RATE: 18 BRPM | WEIGHT: 145.5 LBS | OXYGEN SATURATION: 98 %

## 2018-02-23 PROCEDURE — 96365 THER/PROPH/DIAG IV INF INIT: CPT

## 2018-02-23 PROCEDURE — 700111 HCHG RX REV CODE 636 W/ 250 OVERRIDE (IP): Performed by: INTERNAL MEDICINE

## 2018-02-23 PROCEDURE — 700105 HCHG RX REV CODE 258: Performed by: INTERNAL MEDICINE

## 2018-02-23 RX ADMIN — DAPTOMYCIN 400 MG: 500 INJECTION, POWDER, LYOPHILIZED, FOR SOLUTION INTRAVENOUS at 15:57

## 2018-02-23 ASSESSMENT — PAIN SCALES - GENERAL: PAINLEVEL: 2=MINIMAL-SLIGHT

## 2018-02-23 NOTE — PROGRESS NOTES
Pt returns for daily IV abx. R PICC in place, line patent with brisk blood return observed. Dapto infused as ordered. Dressing to PICC changed in sterile field. Dapto completed. Line flushed clear. PICC flushed and re-wrapped. Pt knows to return tomorrow. Discharged home under care of daughter in no apparent distress.

## 2018-02-24 ENCOUNTER — OUTPATIENT INFUSION SERVICES (OUTPATIENT)
Dept: ONCOLOGY | Facility: MEDICAL CENTER | Age: 58
End: 2018-02-24
Attending: INTERNAL MEDICINE
Payer: COMMERCIAL

## 2018-02-24 VITALS
BODY MASS INDEX: 28.38 KG/M2 | WEIGHT: 145.5 LBS | OXYGEN SATURATION: 98 % | DIASTOLIC BLOOD PRESSURE: 81 MMHG | SYSTOLIC BLOOD PRESSURE: 118 MMHG | HEART RATE: 78 BPM | RESPIRATION RATE: 18 BRPM | TEMPERATURE: 97.7 F

## 2018-02-24 PROCEDURE — 700105 HCHG RX REV CODE 258: Performed by: INTERNAL MEDICINE

## 2018-02-24 PROCEDURE — 96365 THER/PROPH/DIAG IV INF INIT: CPT

## 2018-02-24 PROCEDURE — 700111 HCHG RX REV CODE 636 W/ 250 OVERRIDE (IP): Performed by: INTERNAL MEDICINE

## 2018-02-24 RX ADMIN — DAPTOMYCIN 400 MG: 500 INJECTION, POWDER, LYOPHILIZED, FOR SOLUTION INTRAVENOUS at 16:51

## 2018-02-24 ASSESSMENT — PAIN SCALES - GENERAL: PAINLEVEL: NO PAIN

## 2018-02-24 NOTE — PROGRESS NOTES
Pt returns to infusion center for IV antibiotics.  PICC line in place, brisk blood return noted.  Pt tolerated infusion without incident.  Cap changed.  PICC line flushed with saline per policy, gauze and net placed.  Pt left infusion center ambulatory and in good condition.  Returns daily.

## 2018-02-25 ENCOUNTER — OUTPATIENT INFUSION SERVICES (OUTPATIENT)
Dept: ONCOLOGY | Facility: MEDICAL CENTER | Age: 58
End: 2018-02-25
Attending: INTERNAL MEDICINE
Payer: COMMERCIAL

## 2018-02-25 VITALS
SYSTOLIC BLOOD PRESSURE: 128 MMHG | WEIGHT: 145.5 LBS | RESPIRATION RATE: 16 BRPM | TEMPERATURE: 99 F | HEART RATE: 84 BPM | DIASTOLIC BLOOD PRESSURE: 83 MMHG | BODY MASS INDEX: 28.38 KG/M2 | OXYGEN SATURATION: 99 %

## 2018-02-25 PROCEDURE — 700111 HCHG RX REV CODE 636 W/ 250 OVERRIDE (IP): Performed by: INTERNAL MEDICINE

## 2018-02-25 PROCEDURE — 96365 THER/PROPH/DIAG IV INF INIT: CPT

## 2018-02-25 PROCEDURE — 700105 HCHG RX REV CODE 258: Performed by: INTERNAL MEDICINE

## 2018-02-25 RX ADMIN — DAPTOMYCIN 400 MG: 500 INJECTION, POWDER, LYOPHILIZED, FOR SOLUTION INTRAVENOUS at 15:57

## 2018-02-25 ASSESSMENT — PAIN SCALES - GENERAL: PAINLEVEL: NO PAIN

## 2018-02-25 NOTE — PROGRESS NOTES
Patient arrived ambulatory to the [facility] for Daptomycin. Reviewed vital signs, labs, and physician order. Patient denies cramping or GI distress. Patient arrives with SL PICC to [facility], visualized brisk blood return. Daptomycin administered, no adverse reaction observed. PICC line flushed per protocol, 4X4 gauze and mesh sleeve placed for protection.  Confirmed upcoming appointment date and time with patient. Patient left the [facility] ambulatory in no signs of distress.

## 2018-02-26 ENCOUNTER — OUTPATIENT INFUSION SERVICES (OUTPATIENT)
Dept: ONCOLOGY | Facility: MEDICAL CENTER | Age: 58
End: 2018-02-26
Attending: INTERNAL MEDICINE
Payer: COMMERCIAL

## 2018-02-26 VITALS
WEIGHT: 147.93 LBS | BODY MASS INDEX: 29.04 KG/M2 | TEMPERATURE: 98.9 F | OXYGEN SATURATION: 98 % | DIASTOLIC BLOOD PRESSURE: 76 MMHG | SYSTOLIC BLOOD PRESSURE: 119 MMHG | HEART RATE: 80 BPM | RESPIRATION RATE: 18 BRPM | HEIGHT: 60 IN

## 2018-02-26 LAB
ALBUMIN SERPL BCP-MCNC: 4.2 G/DL (ref 3.2–4.9)
ALP SERPL-CCNC: 147 U/L (ref 30–99)
ALT SERPL-CCNC: 52 U/L (ref 2–50)
AST SERPL-CCNC: 32 U/L (ref 12–45)
BASOPHILS # BLD AUTO: 0.7 % (ref 0–1.8)
BASOPHILS # BLD: 0.05 K/UL (ref 0–0.12)
BILIRUB CONJ SERPL-MCNC: 0.3 MG/DL (ref 0.1–0.5)
BILIRUB INDIRECT SERPL-MCNC: 0.5 MG/DL (ref 0–1)
BILIRUB SERPL-MCNC: 0.8 MG/DL (ref 0.1–1.5)
BUN SERPL-MCNC: 16 MG/DL (ref 8–22)
CALCIUM SERPL-MCNC: 9.5 MG/DL (ref 8.5–10.5)
CHLORIDE SERPL-SCNC: 99 MMOL/L (ref 96–112)
CK SERPL-CCNC: 23 U/L (ref 0–154)
CO2 SERPL-SCNC: 28 MMOL/L (ref 20–33)
CREAT SERPL-MCNC: 0.84 MG/DL (ref 0.5–1.4)
EOSINOPHIL # BLD AUTO: 0.34 K/UL (ref 0–0.51)
EOSINOPHIL NFR BLD: 4.6 % (ref 0–6.9)
ERYTHROCYTE [DISTWIDTH] IN BLOOD BY AUTOMATED COUNT: 49.6 FL (ref 35.9–50)
GLUCOSE SERPL-MCNC: 148 MG/DL (ref 65–99)
HCT VFR BLD AUTO: 35.4 % (ref 37–47)
HGB BLD-MCNC: 12.3 G/DL (ref 12–16)
IMM GRANULOCYTES # BLD AUTO: 0.03 K/UL (ref 0–0.11)
IMM GRANULOCYTES NFR BLD AUTO: 0.4 % (ref 0–0.9)
LYMPHOCYTES # BLD AUTO: 3.31 K/UL (ref 1–4.8)
LYMPHOCYTES NFR BLD: 45 % (ref 22–41)
MCH RBC QN AUTO: 33 PG (ref 27–33)
MCHC RBC AUTO-ENTMCNC: 34.7 G/DL (ref 33.6–35)
MCV RBC AUTO: 94.9 FL (ref 81.4–97.8)
MONOCYTES # BLD AUTO: 0.6 K/UL (ref 0–0.85)
MONOCYTES NFR BLD AUTO: 8.2 % (ref 0–13.4)
NEUTROPHILS # BLD AUTO: 3.02 K/UL (ref 2–7.15)
NEUTROPHILS NFR BLD: 41.1 % (ref 44–72)
NRBC # BLD AUTO: 0 K/UL
NRBC BLD-RTO: 0 /100 WBC
PHOSPHATE SERPL-MCNC: 3.7 MG/DL (ref 2.5–4.5)
PLATELET # BLD AUTO: 436 K/UL (ref 164–446)
PMV BLD AUTO: 8.8 FL (ref 9–12.9)
POTASSIUM SERPL-SCNC: 3.9 MMOL/L (ref 3.6–5.5)
PROT SERPL-MCNC: 7.5 G/DL (ref 6–8.2)
RBC # BLD AUTO: 3.73 M/UL (ref 4.2–5.4)
SODIUM SERPL-SCNC: 136 MMOL/L (ref 135–145)
WBC # BLD AUTO: 7.4 K/UL (ref 4.8–10.8)

## 2018-02-26 PROCEDURE — 700111 HCHG RX REV CODE 636 W/ 250 OVERRIDE (IP): Performed by: INTERNAL MEDICINE

## 2018-02-26 PROCEDURE — 80076 HEPATIC FUNCTION PANEL: CPT

## 2018-02-26 PROCEDURE — 36592 COLLECT BLOOD FROM PICC: CPT

## 2018-02-26 PROCEDURE — 80069 RENAL FUNCTION PANEL: CPT

## 2018-02-26 PROCEDURE — 85025 COMPLETE CBC W/AUTO DIFF WBC: CPT

## 2018-02-26 PROCEDURE — 82550 ASSAY OF CK (CPK): CPT

## 2018-02-26 PROCEDURE — 700105 HCHG RX REV CODE 258: Performed by: INTERNAL MEDICINE

## 2018-02-26 PROCEDURE — 96365 THER/PROPH/DIAG IV INF INIT: CPT

## 2018-02-26 RX ADMIN — DAPTOMYCIN 400 MG: 500 INJECTION, POWDER, LYOPHILIZED, FOR SOLUTION INTRAVENOUS at 16:39

## 2018-02-26 ASSESSMENT — PAIN SCALES - GENERAL: PAINLEVEL: NO PAIN

## 2018-02-26 NOTE — PROGRESS NOTES
Pt is here for her scheduled IVABX. Voices no concerns. Family present. Infusion completed without an incident. Discharged home to self care. Returns daily.

## 2018-02-27 ENCOUNTER — OUTPATIENT INFUSION SERVICES (OUTPATIENT)
Dept: ONCOLOGY | Facility: MEDICAL CENTER | Age: 58
End: 2018-02-27
Attending: INTERNAL MEDICINE
Payer: COMMERCIAL

## 2018-02-27 VITALS
WEIGHT: 147.93 LBS | TEMPERATURE: 98 F | BODY MASS INDEX: 28.85 KG/M2 | RESPIRATION RATE: 18 BRPM | SYSTOLIC BLOOD PRESSURE: 122 MMHG | DIASTOLIC BLOOD PRESSURE: 84 MMHG | HEART RATE: 78 BPM | OXYGEN SATURATION: 98 %

## 2018-02-27 PROCEDURE — 700105 HCHG RX REV CODE 258: Performed by: INTERNAL MEDICINE

## 2018-02-27 PROCEDURE — 96365 THER/PROPH/DIAG IV INF INIT: CPT

## 2018-02-27 PROCEDURE — 700111 HCHG RX REV CODE 636 W/ 250 OVERRIDE (IP): Performed by: INTERNAL MEDICINE

## 2018-02-27 RX ADMIN — DAPTOMYCIN 400 MG: 500 INJECTION, POWDER, LYOPHILIZED, FOR SOLUTION INTRAVENOUS at 15:35

## 2018-02-27 ASSESSMENT — PAIN SCALES - GENERAL: PAINLEVEL: NO PAIN

## 2018-02-27 NOTE — PROGRESS NOTES
Pt presents ambulatory to IS for daily Daptomycin infusion, she denies acute complaints today.  RUE PICC line in place, blood return verified.  Labs drawn per MD orders.  Dapto administered per MAR without complication.  PICC flushed, protected with gauze and arm sleeve.  Next appointment confirmed.  Pt discharged from IS in NAD under self care.

## 2018-02-28 ENCOUNTER — OUTPATIENT INFUSION SERVICES (OUTPATIENT)
Dept: ONCOLOGY | Facility: MEDICAL CENTER | Age: 58
End: 2018-02-28
Attending: INTERNAL MEDICINE
Payer: COMMERCIAL

## 2018-02-28 VITALS
RESPIRATION RATE: 18 BRPM | DIASTOLIC BLOOD PRESSURE: 78 MMHG | BODY MASS INDEX: 28.85 KG/M2 | OXYGEN SATURATION: 98 % | SYSTOLIC BLOOD PRESSURE: 119 MMHG | HEART RATE: 75 BPM | TEMPERATURE: 99 F | WEIGHT: 147.93 LBS

## 2018-02-28 PROCEDURE — 700105 HCHG RX REV CODE 258: Performed by: INTERNAL MEDICINE

## 2018-02-28 PROCEDURE — 700111 HCHG RX REV CODE 636 W/ 250 OVERRIDE (IP): Performed by: INTERNAL MEDICINE

## 2018-02-28 PROCEDURE — 96365 THER/PROPH/DIAG IV INF INIT: CPT

## 2018-02-28 RX ADMIN — DAPTOMYCIN 400 MG: 500 INJECTION, POWDER, LYOPHILIZED, FOR SOLUTION INTRAVENOUS at 15:53

## 2018-02-28 ASSESSMENT — PAIN SCALES - GENERAL: PAINLEVEL: NO PAIN

## 2018-02-28 NOTE — PROGRESS NOTES
Pt is here for her scheduled IVABX. Tolerating her treatments well. No concerns. Infusion completed without an incident. Discharged home to self care. Returns daily.

## 2018-02-28 NOTE — PROGRESS NOTES
Pt arrives ambulatory to IS, accompanied by self.  pt is here for IV Daptomycin.  Pt denies gastric upset, denies any new muscle aches.  IV ABX infused as ordered, pt tolerated well, no evidence of adverse effects noted or expressed.  PICC flushes easily, brisk blood return noted.  Pt dc'd ambulatory to self care in no apparent distress.  Returns daily.

## 2018-03-01 ENCOUNTER — OFFICE VISIT (OUTPATIENT)
Dept: INFECTIOUS DISEASES | Facility: MEDICAL CENTER | Age: 58
End: 2018-03-01
Payer: COMMERCIAL

## 2018-03-01 ENCOUNTER — OUTPATIENT INFUSION SERVICES (OUTPATIENT)
Dept: ONCOLOGY | Facility: MEDICAL CENTER | Age: 58
End: 2018-03-01
Attending: INTERNAL MEDICINE
Payer: COMMERCIAL

## 2018-03-01 VITALS
HEART RATE: 74 BPM | BODY MASS INDEX: 28.85 KG/M2 | TEMPERATURE: 99 F | SYSTOLIC BLOOD PRESSURE: 118 MMHG | OXYGEN SATURATION: 93 % | WEIGHT: 147.93 LBS | RESPIRATION RATE: 18 BRPM | DIASTOLIC BLOOD PRESSURE: 74 MMHG

## 2018-03-01 VITALS
BODY MASS INDEX: 28.94 KG/M2 | SYSTOLIC BLOOD PRESSURE: 110 MMHG | DIASTOLIC BLOOD PRESSURE: 70 MMHG | WEIGHT: 147.4 LBS | TEMPERATURE: 99.3 F | HEART RATE: 75 BPM | OXYGEN SATURATION: 94 % | HEIGHT: 60 IN

## 2018-03-01 DIAGNOSIS — L02.11 NECK ABSCESS: ICD-10-CM

## 2018-03-01 DIAGNOSIS — D72.829 LEUKOCYTOSIS, UNSPECIFIED TYPE: ICD-10-CM

## 2018-03-01 DIAGNOSIS — A41.01 MSSA (METHICILLIN SUSCEPTIBLE STAPHYLOCOCCUS AUREUS) SEPTICEMIA (HCC): ICD-10-CM

## 2018-03-01 DIAGNOSIS — J15.211 PNEUMONIA DUE TO METHICILLIN SUSCEPTIBLE STAPHYLOCOCCUS AUREUS, UNSPECIFIED LATERALITY, UNSPECIFIED PART OF LUNG: ICD-10-CM

## 2018-03-01 DIAGNOSIS — A49.01 MSSA (METHICILLIN SUSCEPTIBLE STAPHYLOCOCCUS AUREUS) INFECTION: ICD-10-CM

## 2018-03-01 PROCEDURE — 96365 THER/PROPH/DIAG IV INF INIT: CPT

## 2018-03-01 PROCEDURE — 700105 HCHG RX REV CODE 258: Performed by: INTERNAL MEDICINE

## 2018-03-01 PROCEDURE — 99214 OFFICE O/P EST MOD 30 MIN: CPT | Performed by: NURSE PRACTITIONER

## 2018-03-01 PROCEDURE — 700111 HCHG RX REV CODE 636 W/ 250 OVERRIDE (IP): Performed by: INTERNAL MEDICINE

## 2018-03-01 RX ORDER — DAPTOMYCIN 50 MG/ML
INJECTION, POWDER, LYOPHILIZED, FOR SOLUTION INTRAVENOUS
COMMUNITY
End: 2018-04-19

## 2018-03-01 RX ADMIN — DAPTOMYCIN 400 MG: 500 INJECTION, POWDER, LYOPHILIZED, FOR SOLUTION INTRAVENOUS at 15:36

## 2018-03-01 ASSESSMENT — PAIN SCALES - GENERAL: PAINLEVEL: NO PAIN

## 2018-03-01 NOTE — PROGRESS NOTES
[facility]    Subjective:     Chief Complaint   Patient presents with   • Hospital Follow-up     Staph aureus bacteremia     This is my first time meeting Ms. Persaud.  Accompanied by her daughter.    Interval History: 57 y.o.  female who has history of hypertension.  Pt started having pain in her right shoulder and neck about 5-6 days prior to hospitalization, the pain was exacerbated by palpation and taking deep breaths.  She went to urgent care on 2/6/2018 and was sent home with the muscle relaxant and a steroid.  Unfortunately, the pain worsened and the pt was hospitalized from 2/7- 2/16/18, admitted for swelling on the right side of her neck with a WBC count of 18,000.  Bcx on 2/7 +MSSA.  CT chest 2/7 +PNA.  Repeat Bcx negative on 2/12.  TTE and WES were negative.  CT of neck on 2/10 +abscess, pt underwent I&D with Dr. Perez.  OR cx on 2/10 +MSSA.  Discharged home on IV Daptomyin through -hospitals, end date 3/12/18.     Hospital records reviewed    Today, 3/1/2018: Patient reports feeling well and has been tolerating the IV Daptomycin without adverse effect.  Denies feeling generally ill, fevers/chills, general malaise, headache, n/v/d, abdominal pain, chest pain or shortness of breath.  Denies any new or worsening muscle aches or joint pain.  R neck well healed, pt denies any drainage, odor, redness or swelling.  Neck is sore from time to time, pt will take Tylenol or place a warm washcloth on site to ease soreness.  Saw Dr. Perez on 2/22- happy with progress.  R shoulder feeling better, still sore in the mornings.  ROM to R should has improved.      ROS  As documented above in my HPI.    No past medical history on file.    Social History   Substance Use Topics   • Smoking status: Never Smoker   • Smokeless tobacco: Never Used   • Alcohol use No       Allergies: Patient has no known allergies.    Pt's medication and problem list reviewed.     Objective:     PE:  /70   Pulse 75    "Temp 37.4 °C (99.3 °F)   Ht 1.525 m (5' 0.04\")   Wt 66.9 kg (147 lb 6.4 oz)   SpO2 94%   BMI 28.75 kg/m²     Vital signs reviewed    Constitutional: Appears well-developed and well-nourished. No acute distress.  Speech fluent.  Overweight.    Eyes: Conjunctivae normal and EOM are normal. Pupils are equal, round, and reactive to light.   Neck: Trachea midline. Decreased range of motion. No JVD.  R neck, surgical site -well healed and approximated, no openings or drainage, no swelling or erythema, non tender to palpation.  Cardiovascular: Normal rate, regular rhythm, normal heart sounds. No murmur, gallop, or friction rub. No edema.  Respiratory: No respiratory distress, unlabored respiratory effort.  Lungs clear to auscultation bilaterally. No wheezes or rales.   Abdomen: Soft, non tender, non-distended. BS + x 4. No masses.   Musculoskeletal: Steady gait.  Limited range of motion to R shoulder.  No joint or bone tenderness, swelling, erythema or deformity.    RUE PICC- CDI, non tender, no erythema.  Skin: Warm and dry. Good turgor. No visible rashes or lesions.  Neurological: No cranial nerve deficit. Coordination normal.  Sensation intact.  Psychiatric: Alert and oriented to person, place, and time. Normal mood, calm affect.  Normal behavior and judgment.     Labs:  WBC   Date/Time Value Ref Range Status   02/26/2018 04:30 PM 7.4 4.8 - 10.8 K/uL Final     RBC   Date/Time Value Ref Range Status   02/26/2018 04:30 PM 3.73 (L) 4.20 - 5.40 M/uL Final     Hemoglobin   Date/Time Value Ref Range Status   02/26/2018 04:30 PM 12.3 12.0 - 16.0 g/dL Final     Hematocrit   Date/Time Value Ref Range Status   02/26/2018 04:30 PM 35.4 (L) 37.0 - 47.0 % Final     MCV   Date/Time Value Ref Range Status   02/26/2018 04:30 PM 94.9 81.4 - 97.8 fL Final     MCH   Date/Time Value Ref Range Status   02/26/2018 04:30 PM 33.0 27.0 - 33.0 pg Final     MCHC   Date/Time Value Ref Range Status   02/26/2018 04:30 PM 34.7 33.6 - 35.0 g/dL " Final     MPV   Date/Time Value Ref Range Status   02/26/2018 04:30 PM 8.8 (L) 9.0 - 12.9 fL Final        Sodium   Date/Time Value Ref Range Status   02/26/2018 04:30  135 - 145 mmol/L Final     Potassium   Date/Time Value Ref Range Status   02/26/2018 04:30 PM 3.9 3.6 - 5.5 mmol/L Final     Chloride   Date/Time Value Ref Range Status   02/26/2018 04:30 PM 99 96 - 112 mmol/L Final     Co2   Date/Time Value Ref Range Status   02/26/2018 04:30 PM 28 20 - 33 mmol/L Final     Glucose   Date/Time Value Ref Range Status   02/26/2018 04:30  (H) 65 - 99 mg/dL Final     Bun   Date/Time Value Ref Range Status   02/26/2018 04:30 PM 16 8 - 22 mg/dL Final     Creatinine   Date/Time Value Ref Range Status   02/26/2018 04:30 PM 0.84 0.50 - 1.40 mg/dL Final     Bun-Creatinine Ratio   Date/Time Value Ref Range Status   07/07/2016 07:31 AM 19 9 - 23 Final       Alkaline Phosphatase   Date/Time Value Ref Range Status   02/26/2018 04:30  (H) 30 - 99 U/L Final     AST(SGOT)   Date/Time Value Ref Range Status   02/26/2018 04:30 PM 32 12 - 45 U/L Final     ALT(SGPT)   Date/Time Value Ref Range Status   02/26/2018 04:30 PM 52 (H) 2 - 50 U/L Final     Total Bilirubin   Date/Time Value Ref Range Status   02/26/2018 04:30 PM 0.8 0.1 - 1.5 mg/dL Final        CPK Total   Date/Time Value Ref Range Status   02/26/2018 04:30 PM 23 0 - 154 U/L Final        Assessment and Plan:   The following treatment plan was discussed with patient at length:    1. MSSA (methicillin susceptible Staphylococcus aureus) septicemia (CMS-[facility])      -Finish IV Daptomycin on 3/12/18 as directed.  No PO abx to follow.  -D/C PICC after last infusion dose.  -Monitor for s/sx of worsening off abx: fevers, chills, general malaise, etc.  Notify ID or go to ER should these s/sx occur.   2. Pneumonia due to methicillin susceptible Staphylococcus aureus, unspecified laterality, unspecified part of lung (CMS-HCC)      As above.   3. Leukocytosis, unspecified type       Resolved.  Last WBC 7.4   4. Neck abscess      -Abx as above.  -Monitor for s/sx of worsening off abx: increased redness, pain, swelling, drainage, breakdown of surgical site, fevers, chills, general malaise, etc.  Notify ID or go to ER should these s/sx occur.   5. MSSA (methicillin susceptible Staphylococcus aureus) infection      As above.     Follow up: PRN, RTC sooner if needed. FU with PCP for ongoing chronic medical conditions.     CHRISTIANO Johnson.

## 2018-03-02 ENCOUNTER — OUTPATIENT INFUSION SERVICES (OUTPATIENT)
Dept: ONCOLOGY | Facility: MEDICAL CENTER | Age: 58
End: 2018-03-02
Attending: INTERNAL MEDICINE
Payer: COMMERCIAL

## 2018-03-02 VITALS
TEMPERATURE: 97.7 F | SYSTOLIC BLOOD PRESSURE: 114 MMHG | WEIGHT: 147.93 LBS | BODY MASS INDEX: 28.85 KG/M2 | DIASTOLIC BLOOD PRESSURE: 84 MMHG | OXYGEN SATURATION: 96 % | RESPIRATION RATE: 18 BRPM | HEART RATE: 77 BPM

## 2018-03-02 PROCEDURE — 700111 HCHG RX REV CODE 636 W/ 250 OVERRIDE (IP): Performed by: INTERNAL MEDICINE

## 2018-03-02 PROCEDURE — 96365 THER/PROPH/DIAG IV INF INIT: CPT

## 2018-03-02 PROCEDURE — 700105 HCHG RX REV CODE 258: Performed by: INTERNAL MEDICINE

## 2018-03-02 RX ADMIN — DAPTOMYCIN 400 MG: 500 INJECTION, POWDER, LYOPHILIZED, FOR SOLUTION INTRAVENOUS at 15:29

## 2018-03-02 ASSESSMENT — PAIN SCALES - GENERAL: PAINLEVEL: NO PAIN

## 2018-03-02 NOTE — PROGRESS NOTES
Pt returns for daily IV abx. R PICC in place, line patent with brisk blood return observed. Dapto infused as ordered. Line flushed clear. PICC flushed and re-wrapped. Pt knows to return tomorrow. Discharged home under care of daughter in no apparent distress.

## 2018-03-02 NOTE — PROGRESS NOTES
Patient presents for daily IV antibiotics. PICC flushes well with brisk blood return. PICC dressing changed using sterile technique. Daptomycin infused as ordered, line flushed clear. PICC flushed per protocol and site secured. Patient returns tomorrow and released in no acute distress.

## 2018-03-03 ENCOUNTER — OUTPATIENT INFUSION SERVICES (OUTPATIENT)
Dept: ONCOLOGY | Facility: MEDICAL CENTER | Age: 58
End: 2018-03-03
Attending: INTERNAL MEDICINE
Payer: COMMERCIAL

## 2018-03-03 VITALS
HEART RATE: 80 BPM | DIASTOLIC BLOOD PRESSURE: 87 MMHG | SYSTOLIC BLOOD PRESSURE: 128 MMHG | OXYGEN SATURATION: 100 % | TEMPERATURE: 98.7 F | RESPIRATION RATE: 16 BRPM

## 2018-03-03 PROCEDURE — 700105 HCHG RX REV CODE 258: Performed by: INTERNAL MEDICINE

## 2018-03-03 PROCEDURE — 96365 THER/PROPH/DIAG IV INF INIT: CPT

## 2018-03-03 PROCEDURE — 700111 HCHG RX REV CODE 636 W/ 250 OVERRIDE (IP): Performed by: INTERNAL MEDICINE

## 2018-03-03 RX ADMIN — DAPTOMYCIN 400 MG: 500 INJECTION, POWDER, LYOPHILIZED, FOR SOLUTION INTRAVENOUS at 15:50

## 2018-03-03 ASSESSMENT — PAIN SCALES - GENERAL: PAINLEVEL: NO PAIN

## 2018-03-04 ENCOUNTER — OUTPATIENT INFUSION SERVICES (OUTPATIENT)
Dept: ONCOLOGY | Facility: MEDICAL CENTER | Age: 58
End: 2018-03-04
Attending: INTERNAL MEDICINE
Payer: COMMERCIAL

## 2018-03-04 VITALS
RESPIRATION RATE: 16 BRPM | DIASTOLIC BLOOD PRESSURE: 88 MMHG | OXYGEN SATURATION: 98 % | TEMPERATURE: 98 F | SYSTOLIC BLOOD PRESSURE: 133 MMHG | HEART RATE: 75 BPM

## 2018-03-04 PROCEDURE — 96365 THER/PROPH/DIAG IV INF INIT: CPT

## 2018-03-04 PROCEDURE — 700111 HCHG RX REV CODE 636 W/ 250 OVERRIDE (IP): Performed by: INTERNAL MEDICINE

## 2018-03-04 PROCEDURE — 700105 HCHG RX REV CODE 258: Performed by: INTERNAL MEDICINE

## 2018-03-04 RX ADMIN — DAPTOMYCIN 400 MG: 500 INJECTION, POWDER, LYOPHILIZED, FOR SOLUTION INTRAVENOUS at 16:51

## 2018-03-04 ASSESSMENT — PAIN SCALES - GENERAL: PAINLEVEL: NO PAIN

## 2018-03-05 ENCOUNTER — OUTPATIENT INFUSION SERVICES (OUTPATIENT)
Dept: ONCOLOGY | Facility: MEDICAL CENTER | Age: 58
End: 2018-03-05
Attending: INTERNAL MEDICINE
Payer: COMMERCIAL

## 2018-03-05 VITALS
OXYGEN SATURATION: 99 % | BODY MASS INDEX: 29.39 KG/M2 | HEART RATE: 72 BPM | DIASTOLIC BLOOD PRESSURE: 81 MMHG | HEIGHT: 60 IN | WEIGHT: 149.69 LBS | TEMPERATURE: 98.4 F | SYSTOLIC BLOOD PRESSURE: 123 MMHG | RESPIRATION RATE: 18 BRPM

## 2018-03-05 LAB
ALBUMIN SERPL BCP-MCNC: 4 G/DL (ref 3.2–4.9)
ALP SERPL-CCNC: 104 U/L (ref 30–99)
ALT SERPL-CCNC: 53 U/L (ref 2–50)
AST SERPL-CCNC: 28 U/L (ref 12–45)
BASOPHILS # BLD AUTO: 0.3 % (ref 0–1.8)
BASOPHILS # BLD: 0.02 K/UL (ref 0–0.12)
BILIRUB CONJ SERPL-MCNC: 0.2 MG/DL (ref 0.1–0.5)
BILIRUB INDIRECT SERPL-MCNC: 0.4 MG/DL (ref 0–1)
BILIRUB SERPL-MCNC: 0.6 MG/DL (ref 0.1–1.5)
BUN SERPL-MCNC: 13 MG/DL (ref 8–22)
CALCIUM SERPL-MCNC: 9.8 MG/DL (ref 8.5–10.5)
CHLORIDE SERPL-SCNC: 102 MMOL/L (ref 96–112)
CK SERPL-CCNC: 19 U/L (ref 0–154)
CO2 SERPL-SCNC: 25 MMOL/L (ref 20–33)
CREAT SERPL-MCNC: 0.61 MG/DL (ref 0.5–1.4)
EOSINOPHIL # BLD AUTO: 0.51 K/UL (ref 0–0.51)
EOSINOPHIL NFR BLD: 6.8 % (ref 0–6.9)
ERYTHROCYTE [DISTWIDTH] IN BLOOD BY AUTOMATED COUNT: 49.1 FL (ref 35.9–50)
GLUCOSE SERPL-MCNC: 120 MG/DL (ref 65–99)
HCT VFR BLD AUTO: 34.8 % (ref 37–47)
HGB BLD-MCNC: 12.5 G/DL (ref 12–16)
IMM GRANULOCYTES # BLD AUTO: 0.05 K/UL (ref 0–0.11)
IMM GRANULOCYTES NFR BLD AUTO: 0.7 % (ref 0–0.9)
LYMPHOCYTES # BLD AUTO: 3.48 K/UL (ref 1–4.8)
LYMPHOCYTES NFR BLD: 46.3 % (ref 22–41)
MCH RBC QN AUTO: 33.8 PG (ref 27–33)
MCHC RBC AUTO-ENTMCNC: 35.9 G/DL (ref 33.6–35)
MCV RBC AUTO: 94.1 FL (ref 81.4–97.8)
MONOCYTES # BLD AUTO: 0.55 K/UL (ref 0–0.85)
MONOCYTES NFR BLD AUTO: 7.3 % (ref 0–13.4)
NEUTROPHILS # BLD AUTO: 2.91 K/UL (ref 2–7.15)
NEUTROPHILS NFR BLD: 38.6 % (ref 44–72)
NRBC # BLD AUTO: 0 K/UL
NRBC BLD-RTO: 0 /100 WBC
PHOSPHATE SERPL-MCNC: 3.7 MG/DL (ref 2.5–4.5)
PLATELET # BLD AUTO: 236 K/UL (ref 164–446)
PMV BLD AUTO: 9.4 FL (ref 9–12.9)
POTASSIUM SERPL-SCNC: 3.9 MMOL/L (ref 3.6–5.5)
PROT SERPL-MCNC: 7.2 G/DL (ref 6–8.2)
RBC # BLD AUTO: 3.7 M/UL (ref 4.2–5.4)
SODIUM SERPL-SCNC: 135 MMOL/L (ref 135–145)
WBC # BLD AUTO: 7.5 K/UL (ref 4.8–10.8)

## 2018-03-05 PROCEDURE — 85025 COMPLETE CBC W/AUTO DIFF WBC: CPT

## 2018-03-05 PROCEDURE — 700111 HCHG RX REV CODE 636 W/ 250 OVERRIDE (IP): Performed by: INTERNAL MEDICINE

## 2018-03-05 PROCEDURE — 700105 HCHG RX REV CODE 258: Performed by: INTERNAL MEDICINE

## 2018-03-05 PROCEDURE — 80069 RENAL FUNCTION PANEL: CPT

## 2018-03-05 PROCEDURE — 82550 ASSAY OF CK (CPK): CPT

## 2018-03-05 PROCEDURE — 36592 COLLECT BLOOD FROM PICC: CPT

## 2018-03-05 PROCEDURE — 80076 HEPATIC FUNCTION PANEL: CPT

## 2018-03-05 PROCEDURE — 96365 THER/PROPH/DIAG IV INF INIT: CPT

## 2018-03-05 RX ADMIN — DAPTOMYCIN 400 MG: 500 INJECTION, POWDER, LYOPHILIZED, FOR SOLUTION INTRAVENOUS at 15:40

## 2018-03-05 ASSESSMENT — PAIN SCALES - GENERAL: PAINLEVEL: NO PAIN

## 2018-03-05 NOTE — PROGRESS NOTES
Patient arrived to clinic for Daptomycin. Denies any changes from previous appointment.  PICC line flushed with good blood return noted.  Daptomycin infused per order, pt tolerated well.  PICC line flushed and guaze/mesh cover placed.  Confirmed tomorrow's appointment and pt ambulated out of clinic in no apparent distress.

## 2018-03-06 ENCOUNTER — OUTPATIENT INFUSION SERVICES (OUTPATIENT)
Dept: ONCOLOGY | Facility: MEDICAL CENTER | Age: 58
End: 2018-03-06
Attending: INTERNAL MEDICINE
Payer: COMMERCIAL

## 2018-03-06 VITALS
TEMPERATURE: 98.3 F | RESPIRATION RATE: 18 BRPM | DIASTOLIC BLOOD PRESSURE: 81 MMHG | HEART RATE: 73 BPM | SYSTOLIC BLOOD PRESSURE: 134 MMHG | OXYGEN SATURATION: 98 %

## 2018-03-06 DIAGNOSIS — A41.9 SEPSIS, DUE TO UNSPECIFIED ORGANISM: ICD-10-CM

## 2018-03-06 PROCEDURE — 700111 HCHG RX REV CODE 636 W/ 250 OVERRIDE (IP): Performed by: INTERNAL MEDICINE

## 2018-03-06 PROCEDURE — 700105 HCHG RX REV CODE 258: Performed by: INTERNAL MEDICINE

## 2018-03-06 PROCEDURE — 96365 THER/PROPH/DIAG IV INF INIT: CPT

## 2018-03-06 RX ADMIN — DAPTOMYCIN 400 MG: 500 INJECTION, POWDER, LYOPHILIZED, FOR SOLUTION INTRAVENOUS at 15:43

## 2018-03-06 ASSESSMENT — PAIN SCALES - GENERAL: PAINLEVEL: NO PAIN

## 2018-03-06 NOTE — PROGRESS NOTES
Pt to infusion services ambulatory per self.  Pt here for scheduled IV abx.  Plan of care reviewed.  Pt verbalizes understanding.  Pt denies any gastrointestinal issues or muscle aches.  Pt with PICC line to right upper arm.  PICC flushed with normal saline.  PICC flushes easily; +blood return verified.  IV Daptomycin administered per MD orders.  IV Daptomycin infusing at this time.  Pt resting in chair.  Call light at hand.

## 2018-03-06 NOTE — PROGRESS NOTES
Pt arrives ambulatory to IS, accompanied by self.  pt is here for IV Daptomycin.  Pt denies gastric upset, denies any new muscle aches.  IV ABX infused as ordered, pt tolerated well, no evidence of adverse effects noted or expressed.  PICC flushes easily, brisk blood return noted.  Pt dc'd to self care to in no apparent distress.  Returns daily.

## 2018-03-07 ENCOUNTER — OUTPATIENT INFUSION SERVICES (OUTPATIENT)
Dept: ONCOLOGY | Facility: MEDICAL CENTER | Age: 58
End: 2018-03-07
Attending: INTERNAL MEDICINE
Payer: COMMERCIAL

## 2018-03-07 ENCOUNTER — OFFICE VISIT (OUTPATIENT)
Dept: MEDICAL GROUP | Facility: PHYSICIAN GROUP | Age: 58
End: 2018-03-07
Payer: COMMERCIAL

## 2018-03-07 VITALS
RESPIRATION RATE: 14 BRPM | SYSTOLIC BLOOD PRESSURE: 128 MMHG | TEMPERATURE: 98.7 F | BODY MASS INDEX: 27.48 KG/M2 | HEIGHT: 60 IN | DIASTOLIC BLOOD PRESSURE: 86 MMHG | HEART RATE: 74 BPM | OXYGEN SATURATION: 98 % | WEIGHT: 140 LBS

## 2018-03-07 VITALS
OXYGEN SATURATION: 98 % | DIASTOLIC BLOOD PRESSURE: 87 MMHG | SYSTOLIC BLOOD PRESSURE: 131 MMHG | RESPIRATION RATE: 18 BRPM | TEMPERATURE: 98.2 F | HEART RATE: 71 BPM

## 2018-03-07 DIAGNOSIS — L02.11 ABSCESS, NECK: ICD-10-CM

## 2018-03-07 DIAGNOSIS — I48.91 ATRIAL FIBRILLATION WITH RVR (HCC): ICD-10-CM

## 2018-03-07 PROCEDURE — 700111 HCHG RX REV CODE 636 W/ 250 OVERRIDE (IP): Performed by: INTERNAL MEDICINE

## 2018-03-07 PROCEDURE — 99214 OFFICE O/P EST MOD 30 MIN: CPT | Performed by: FAMILY MEDICINE

## 2018-03-07 PROCEDURE — 96365 THER/PROPH/DIAG IV INF INIT: CPT

## 2018-03-07 PROCEDURE — 700105 HCHG RX REV CODE 258: Performed by: INTERNAL MEDICINE

## 2018-03-07 RX ADMIN — DAPTOMYCIN 400 MG: 500 INJECTION, POWDER, LYOPHILIZED, FOR SOLUTION INTRAVENOUS at 16:05

## 2018-03-07 ASSESSMENT — PAIN SCALES - GENERAL: PAINLEVEL: NO PAIN

## 2018-03-07 NOTE — PROGRESS NOTES
Chief Complaint   Patient presents with   • Hospital Follow-up   • Blood Infection     fv        HISTORY OF PRESENT ILLNESS: Patient is a 57 y.o. female established patient here today for the following concerns:    1. Abscess, neck  Here today for hospital follow up.  Had sudden onset of right neck and shoulder pain with rapid progression found to be abscess.  Was I&D'd in the hospital with Dr Bryan.  Found to be MSSA and is continuing out patient course of Dapto.  She reports last day is on 3/12.  She has not had any further fevers, chills.  Energy level is returning.     2. Atrial fibrillation with RVR (CMS-HCC)  In addition, while she was in the hospital septic with staph, she developed Afib with RVR and loaded with Amiodarone and converted back to sinus.  She has continued on oral amiodarone and metoprolol.  She does not have set follow up with cardiology yet      Past Medical, Social, and Family history reviewed and updated in [facility]    Allergies:Patient has no known allergies.    Current Outpatient Prescriptions   Medication Sig Dispense Refill   • Probiotic Product (PROBIOTIC PO) Take  by mouth.     • amiodarone (CORDARONE) 200 MG Tab Take 1 Tab by mouth 2 Times a Day. 60 Tab 0   • metoprolol (LOPRESSOR) 25 MG Tab Take 1 Tab by mouth 2 Times a Day. 60 Tab 0   • DAPTOmycin (CUBICIN) 500 MG Recon Soln by Intravenous route.     • Acetaminophen (TYLENOL PO) Take  by mouth.     • cyclobenzaprine (FLEXERIL) 10 MG Tab Take 1 Tab by mouth 3 times a day as needed. 30 Tab 0     No current facility-administered medications for this visit.      Facility-Administered Medications Ordered in Other Visits   Medication Dose Route Frequency Provider Last Rate Last Dose   • DAPTOmycin (CUBICIN) 400 mg in NS 50 mL IVPB  400 mg Intravenous Q24HRS Isidra Khan M.D.             ROS:  Review of Systems   Constitutional: Negative for fever, chills, weight loss and malaise/fatigue.   HENT: Negative for ear pain, nosebleeds,  "congestion, sore throat and neck pain.    Eyes: Negative for blurred vision.   Respiratory: Negative for cough, sputum production, shortness of breath and wheezing.    Cardiovascular: Negative for chest pain, palpitations,  and leg swelling.   Gastrointestinal: Negative for heartburn, nausea, vomiting, diarrhea and abdominal pain.   Genitourinary: Negative for dysuria, urgency and frequency.   Musculoskeletal: Negative for myalgias, back pain and joint pain.   Skin: Negative for rash and itching.   Neurological: Negative for dizziness, tingling, tremors, sensory change, focal weakness and headaches.   Endo/Heme/Allergies: Does not bruise/bleed easily.   Psychiatric/Behavioral: Negative for depression, anxiety, suicidal ideas, insomnia and memory loss.      Exam:  Blood pressure 128/86, pulse 74, temperature 37.1 °C (98.7 °F), resp. rate 14, height 1.525 m (5' 0.04\"), weight 63.5 kg (140 lb), SpO2 98 %.    General:  Well nourished, well developed in NAD  Head is grossly normal.  Neck: Supple without JVD   Pulmonary:  Normal effort.   Cardiovascular: Regular rate  Extremities: no clubbing, cyanosis, or edema.  Psych: affect appropriate      Please note that this dictation was created using voice recognition software. I have made every reasonable attempt to correct obvious errors, but I expect that there are errors of grammar and possibly content that I did not discover before finalizing the note.    Assessment/Plan:  1. Abscess, neck  Finish Dapto, follow up with infectious Disease and ENT as planned.     2. Atrial fibrillation with RVR (CMS-HCC)  Likely 2/2 to sepsis, follow up with cardiology to consider discontinuation of amiodarone.   - REFERRAL TO CARDIOLOGY            "

## 2018-03-07 NOTE — ADDENDUM NOTE
Encounter addended by: Gavin Grace M.D. on: 3/7/2018 11:55 AM<BR>    Actions taken: Sign clinical note

## 2018-03-07 NOTE — PROGRESS NOTES
Late entry for 1605:  Daptomycin infusion completed without incident.  Line flushed clear with normal saline.  PICC flushed with normal saline per policy; continued +blood return verified.  Line secured.  Pt released to self care in no apparent distress after completion of treatment, ambulatory.  Pt returns tomorrow; appointment scheduled.

## 2018-03-08 ENCOUNTER — OUTPATIENT INFUSION SERVICES (OUTPATIENT)
Dept: ONCOLOGY | Facility: MEDICAL CENTER | Age: 58
End: 2018-03-08
Attending: INTERNAL MEDICINE
Payer: COMMERCIAL

## 2018-03-08 VITALS
OXYGEN SATURATION: 99 % | HEART RATE: 73 BPM | SYSTOLIC BLOOD PRESSURE: 131 MMHG | RESPIRATION RATE: 18 BRPM | DIASTOLIC BLOOD PRESSURE: 84 MMHG | TEMPERATURE: 98.8 F

## 2018-03-08 PROCEDURE — 700111 HCHG RX REV CODE 636 W/ 250 OVERRIDE (IP): Performed by: INTERNAL MEDICINE

## 2018-03-08 PROCEDURE — 700105 HCHG RX REV CODE 258: Performed by: INTERNAL MEDICINE

## 2018-03-08 PROCEDURE — 96365 THER/PROPH/DIAG IV INF INIT: CPT

## 2018-03-08 RX ADMIN — DAPTOMYCIN 400 MG: 500 INJECTION, POWDER, LYOPHILIZED, FOR SOLUTION INTRAVENOUS at 13:47

## 2018-03-08 ASSESSMENT — PAIN SCALES - GENERAL: PAINLEVEL: NO PAIN

## 2018-03-08 NOTE — PROGRESS NOTES
Pt returns for daily IV abx. R PICC in place, line patent with brisk blood return observed. Dapto infused as ordered. Line flushed clear. PICC flushed and re-wrapped. Pt knows to return tomorrow. Discharged home under self in no apparent distress.

## 2018-03-08 NOTE — PROGRESS NOTES
Pt arrived to [facility], ambulatory, for Dapto infusion. Pt voices no complaints. PICC line flushed per policy, positive blood return noted. Dapto infused with no s/sx of adverse reaction. PICC line flushed per policy. PICC line dressing changed with sterile field. Pt left IS with no s/sx of distress. Follow up appointment confirmed.

## 2018-03-09 ENCOUNTER — OUTPATIENT INFUSION SERVICES (OUTPATIENT)
Dept: ONCOLOGY | Facility: MEDICAL CENTER | Age: 58
End: 2018-03-09
Attending: INTERNAL MEDICINE
Payer: COMMERCIAL

## 2018-03-09 VITALS
DIASTOLIC BLOOD PRESSURE: 82 MMHG | OXYGEN SATURATION: 98 % | HEART RATE: 79 BPM | SYSTOLIC BLOOD PRESSURE: 130 MMHG | TEMPERATURE: 99 F | RESPIRATION RATE: 18 BRPM

## 2018-03-09 PROCEDURE — 700105 HCHG RX REV CODE 258: Performed by: INTERNAL MEDICINE

## 2018-03-09 PROCEDURE — 96365 THER/PROPH/DIAG IV INF INIT: CPT

## 2018-03-09 PROCEDURE — 700111 HCHG RX REV CODE 636 W/ 250 OVERRIDE (IP): Performed by: INTERNAL MEDICINE

## 2018-03-09 RX ADMIN — DAPTOMYCIN 400 MG: 500 INJECTION, POWDER, LYOPHILIZED, FOR SOLUTION INTRAVENOUS at 15:32

## 2018-03-09 ASSESSMENT — PAIN SCALES - GENERAL: PAINLEVEL: NO PAIN

## 2018-03-10 ENCOUNTER — OUTPATIENT INFUSION SERVICES (OUTPATIENT)
Dept: ONCOLOGY | Facility: MEDICAL CENTER | Age: 58
End: 2018-03-10
Attending: INTERNAL MEDICINE
Payer: COMMERCIAL

## 2018-03-10 VITALS
OXYGEN SATURATION: 99 % | HEART RATE: 73 BPM | RESPIRATION RATE: 18 BRPM | WEIGHT: 139.99 LBS | TEMPERATURE: 99 F | DIASTOLIC BLOOD PRESSURE: 83 MMHG | SYSTOLIC BLOOD PRESSURE: 124 MMHG | BODY MASS INDEX: 27.3 KG/M2

## 2018-03-10 PROCEDURE — 700111 HCHG RX REV CODE 636 W/ 250 OVERRIDE (IP): Performed by: INTERNAL MEDICINE

## 2018-03-10 PROCEDURE — 700105 HCHG RX REV CODE 258: Performed by: INTERNAL MEDICINE

## 2018-03-10 PROCEDURE — 96365 THER/PROPH/DIAG IV INF INIT: CPT

## 2018-03-10 RX ADMIN — DAPTOMYCIN 400 MG: 500 INJECTION, POWDER, LYOPHILIZED, FOR SOLUTION INTRAVENOUS at 15:25

## 2018-03-10 ASSESSMENT — PAIN SCALES - GENERAL: PAINLEVEL: NO PAIN

## 2018-03-11 ENCOUNTER — OUTPATIENT INFUSION SERVICES (OUTPATIENT)
Dept: ONCOLOGY | Facility: MEDICAL CENTER | Age: 58
End: 2018-03-11
Attending: INTERNAL MEDICINE
Payer: COMMERCIAL

## 2018-03-11 VITALS
TEMPERATURE: 98.2 F | HEART RATE: 67 BPM | WEIGHT: 139.99 LBS | OXYGEN SATURATION: 95 % | RESPIRATION RATE: 18 BRPM | DIASTOLIC BLOOD PRESSURE: 83 MMHG | BODY MASS INDEX: 27.3 KG/M2 | SYSTOLIC BLOOD PRESSURE: 125 MMHG

## 2018-03-11 PROCEDURE — 700111 HCHG RX REV CODE 636 W/ 250 OVERRIDE (IP): Performed by: INTERNAL MEDICINE

## 2018-03-11 PROCEDURE — 96365 THER/PROPH/DIAG IV INF INIT: CPT

## 2018-03-11 PROCEDURE — 700105 HCHG RX REV CODE 258: Performed by: INTERNAL MEDICINE

## 2018-03-11 RX ADMIN — DAPTOMYCIN 400 MG: 500 INJECTION, POWDER, LYOPHILIZED, FOR SOLUTION INTRAVENOUS at 15:27

## 2018-03-11 ASSESSMENT — PAIN SCALES - GENERAL: PAINLEVEL: NO PAIN

## 2018-03-12 ENCOUNTER — OUTPATIENT INFUSION SERVICES (OUTPATIENT)
Dept: ONCOLOGY | Facility: MEDICAL CENTER | Age: 58
End: 2018-03-12
Attending: INTERNAL MEDICINE
Payer: COMMERCIAL

## 2018-03-12 VITALS
DIASTOLIC BLOOD PRESSURE: 81 MMHG | HEIGHT: 60 IN | OXYGEN SATURATION: 97 % | WEIGHT: 151.9 LBS | BODY MASS INDEX: 29.82 KG/M2 | HEART RATE: 77 BPM | TEMPERATURE: 98.8 F | SYSTOLIC BLOOD PRESSURE: 138 MMHG | RESPIRATION RATE: 18 BRPM

## 2018-03-12 LAB
ALBUMIN SERPL BCP-MCNC: 3.9 G/DL (ref 3.2–4.9)
ALP SERPL-CCNC: 96 U/L (ref 30–99)
ALT SERPL-CCNC: 34 U/L (ref 2–50)
AST SERPL-CCNC: 20 U/L (ref 12–45)
BASOPHILS # BLD AUTO: 0.4 % (ref 0–1.8)
BASOPHILS # BLD: 0.03 K/UL (ref 0–0.12)
BILIRUB CONJ SERPL-MCNC: 0.2 MG/DL (ref 0.1–0.5)
BILIRUB INDIRECT SERPL-MCNC: 0.3 MG/DL (ref 0–1)
BILIRUB SERPL-MCNC: 0.5 MG/DL (ref 0.1–1.5)
BUN SERPL-MCNC: 15 MG/DL (ref 8–22)
CALCIUM SERPL-MCNC: 9.4 MG/DL (ref 8.5–10.5)
CHLORIDE SERPL-SCNC: 103 MMOL/L (ref 96–112)
CK SERPL-CCNC: 19 U/L (ref 0–154)
CO2 SERPL-SCNC: 25 MMOL/L (ref 20–33)
CREAT SERPL-MCNC: 0.62 MG/DL (ref 0.5–1.4)
EOSINOPHIL # BLD AUTO: 0.3 K/UL (ref 0–0.51)
EOSINOPHIL NFR BLD: 3.9 % (ref 0–6.9)
ERYTHROCYTE [DISTWIDTH] IN BLOOD BY AUTOMATED COUNT: 49.2 FL (ref 35.9–50)
GLUCOSE SERPL-MCNC: 150 MG/DL (ref 65–99)
HCT VFR BLD AUTO: 33.4 % (ref 37–47)
HGB BLD-MCNC: 12.2 G/DL (ref 12–16)
IMM GRANULOCYTES # BLD AUTO: 0.04 K/UL (ref 0–0.11)
IMM GRANULOCYTES NFR BLD AUTO: 0.5 % (ref 0–0.9)
LYMPHOCYTES # BLD AUTO: 3.18 K/UL (ref 1–4.8)
LYMPHOCYTES NFR BLD: 41 % (ref 22–41)
MCH RBC QN AUTO: 34.5 PG (ref 27–33)
MCHC RBC AUTO-ENTMCNC: 36.5 G/DL (ref 33.6–35)
MCV RBC AUTO: 94.4 FL (ref 81.4–97.8)
MONOCYTES # BLD AUTO: 0.41 K/UL (ref 0–0.85)
MONOCYTES NFR BLD AUTO: 5.3 % (ref 0–13.4)
NEUTROPHILS # BLD AUTO: 3.8 K/UL (ref 2–7.15)
NEUTROPHILS NFR BLD: 48.9 % (ref 44–72)
NRBC # BLD AUTO: 0 K/UL
NRBC BLD-RTO: 0 /100 WBC
PHOSPHATE SERPL-MCNC: 3.4 MG/DL (ref 2.5–4.5)
PLATELET # BLD AUTO: 226 K/UL (ref 164–446)
PMV BLD AUTO: 9.2 FL (ref 9–12.9)
POTASSIUM SERPL-SCNC: 3.9 MMOL/L (ref 3.6–5.5)
PROT SERPL-MCNC: 7 G/DL (ref 6–8.2)
RBC # BLD AUTO: 3.54 M/UL (ref 4.2–5.4)
SODIUM SERPL-SCNC: 137 MMOL/L (ref 135–145)
WBC # BLD AUTO: 7.8 K/UL (ref 4.8–10.8)

## 2018-03-12 PROCEDURE — 85025 COMPLETE CBC W/AUTO DIFF WBC: CPT

## 2018-03-12 PROCEDURE — 700111 HCHG RX REV CODE 636 W/ 250 OVERRIDE (IP): Performed by: INTERNAL MEDICINE

## 2018-03-12 PROCEDURE — 700105 HCHG RX REV CODE 258: Performed by: INTERNAL MEDICINE

## 2018-03-12 PROCEDURE — 80076 HEPATIC FUNCTION PANEL: CPT

## 2018-03-12 PROCEDURE — 96365 THER/PROPH/DIAG IV INF INIT: CPT

## 2018-03-12 PROCEDURE — 80069 RENAL FUNCTION PANEL: CPT

## 2018-03-12 PROCEDURE — 82550 ASSAY OF CK (CPK): CPT

## 2018-03-12 PROCEDURE — 36592 COLLECT BLOOD FROM PICC: CPT

## 2018-03-12 RX ADMIN — DAPTOMYCIN 400 MG: 500 INJECTION, POWDER, LYOPHILIZED, FOR SOLUTION INTRAVENOUS at 15:52

## 2018-03-12 ASSESSMENT — PAIN SCALES - GENERAL: PAINLEVEL: NO PAIN

## 2018-03-13 NOTE — PROGRESS NOTES
Returns for last IVAB.  Some nervousness expressed re PICC removal.  Reviewed process and site care once removed.  Tx infused without rx.  PICC removed per protocol without problem.  Observed for 30 min post.  DC to self care.

## 2018-03-14 ENCOUNTER — OFFICE VISIT (OUTPATIENT)
Dept: CARDIOLOGY | Facility: MEDICAL CENTER | Age: 58
End: 2018-03-14
Payer: COMMERCIAL

## 2018-03-14 VITALS
SYSTOLIC BLOOD PRESSURE: 120 MMHG | WEIGHT: 150 LBS | HEART RATE: 72 BPM | OXYGEN SATURATION: 95 % | DIASTOLIC BLOOD PRESSURE: 80 MMHG | HEIGHT: 60 IN | BODY MASS INDEX: 29.45 KG/M2

## 2018-03-14 DIAGNOSIS — I48.91 ATRIAL FIBRILLATION, UNSPECIFIED TYPE (HCC): ICD-10-CM

## 2018-03-14 DIAGNOSIS — I10 ESSENTIAL HYPERTENSION: ICD-10-CM

## 2018-03-14 DIAGNOSIS — I48.91 ATRIAL FIBRILLATION WITH RVR (HCC): ICD-10-CM

## 2018-03-14 LAB — EKG IMPRESSION: NORMAL

## 2018-03-14 PROCEDURE — 99214 OFFICE O/P EST MOD 30 MIN: CPT | Performed by: NURSE PRACTITIONER

## 2018-03-14 PROCEDURE — 93000 ELECTROCARDIOGRAM COMPLETE: CPT | Performed by: NURSE PRACTITIONER

## 2018-03-14 RX ORDER — AMIODARONE HYDROCHLORIDE 200 MG/1
200 TABLET ORAL DAILY
Qty: 30 TAB | Refills: 3 | Status: SHIPPED | OUTPATIENT
Start: 2018-03-14 | End: 2018-04-19 | Stop reason: SDUPTHER

## 2018-03-14 ASSESSMENT — ENCOUNTER SYMPTOMS
FOCAL WEAKNESS: 0
VOMITING: 0
CHILLS: 0
FEVER: 0
PSYCHIATRIC NEGATIVE: 1
BLURRED VISION: 0
LOSS OF CONSCIOUSNESS: 0
COUGH: 0
SPEECH CHANGE: 0
DIZZINESS: 0
BLOOD IN STOOL: 0
NAUSEA: 0
ORTHOPNEA: 0
PND: 0
WHEEZING: 0
HEARTBURN: 0
CLAUDICATION: 0
SORE THROAT: 0
ABDOMINAL PAIN: 0
BRUISES/BLEEDS EASILY: 0
HEADACHES: 0
MYALGIAS: 0
DOUBLE VISION: 0
SHORTNESS OF BREATH: 0

## 2018-03-14 NOTE — LETTER
March 14, 2018    To Whom It May Concern:  Re:Urszula Persaud  Claim Number : [id number]         This is confirmation that Urszula Persaud attended her scheduled appointment with Guerita MORSE on March 14, 2018.  She may return to work from a cardiac perspective on 3/18/18 without restrictions. She will need   Approval from Dr Perez in regard to recent surgery as well for return to physical activities at her work       If you have any questions please do not hesitate to call me at the phone number listed below.    Sincerely,          MINI Garcia  [facility]  [phone]

## 2018-03-14 NOTE — LETTER
[facility]   [address], [address]  [place] [address]  Phone: [phone]  Fax: [phone]              Urszula Persaud  1960    Encounter Date: 3/14/2018    CASSIE Garcia          PROGRESS NOTE:  Subjective:   Urszula Persaud is a 57 y.o. female who presents today for review of her atrial arrhythmias, amiodarone therapy and hypertensive control.  She was admitted to [facility] on 2/7/18 with MSSA near the right shoulder. The shoulder was opened and lavaged and AB were prescribed by ID.  She developed AF with RVR and was seen by Dr Grace.  WES was ordered which demonstrated no evidence of endocarditis, EF 60% atrial sizes were normal. Heart was felt to be structurally normal.  She has felt no recurrence of the atrial fibrillation on the combination of Amiodarone and Metoprolol.  Her BP has been under good control on the Metoprolol as well. She has had no further fever or chills. She is being followed by Dr Perez.    Chief Complaint:Atrial fibrillation  Hypertension    History reviewed. No pertinent past medical history.  Past Surgical History:   Procedure Laterality Date   • INCISION AND DRAINAGE GENERAL Right 2/10/2018    Procedure: INCISION AND DRAINAGE NECK;  Surgeon: Millie Perez M.D.;  Location: SURGERY [facility];  Service: Ent   • VEIN LIGATION  2006     Family History   Problem Relation Age of Onset   • Diabetes Neg Hx    • Cancer Neg Hx      History   Smoking Status   • Never Smoker   Smokeless Tobacco   • Never Used     No Known Allergies  Outpatient Encounter Prescriptions as of 3/14/2018   Medication Sig Dispense Refill   • amiodarone (CORDARONE) 200 MG Tab Take 1 Tab by mouth every day. 30 Tab 3   • metoprolol (LOPRESSOR) 25 MG Tab Take 1 Tab by mouth 2 Times a Day. 60 Tab 3   • DAPTOmycin (CUBICIN) 500 MG Recon Soln by Intravenous route.     • Probiotic Product (PROBIOTIC PO) Take  by mouth.     • Acetaminophen  (TYLENOL PO) Take  by mouth.     • [DISCONTINUED] amiodarone (CORDARONE) 200 MG Tab Take 1 Tab by mouth 2 Times a Day. 60 Tab 0   • [DISCONTINUED] metoprolol (LOPRESSOR) 25 MG Tab Take 1 Tab by mouth 2 Times a Day. 60 Tab 0   • cyclobenzaprine (FLEXERIL) 10 MG Tab Take 1 Tab by mouth 3 times a day as needed. (Patient not taking: Reported on 3/14/2018) 30 Tab 0     No facility-administered encounter medications on file as of 3/14/2018.      Review of Systems   Constitutional: Negative for chills and fever.   HENT: Negative for sore throat.         No difficulty swallowing   Eyes: Negative for blurred vision and double vision.   Respiratory: Negative for cough, shortness of breath and wheezing.    Cardiovascular: Negative for chest pain, orthopnea, claudication, leg swelling and PND.   Gastrointestinal: Negative for abdominal pain, blood in stool, heartburn, nausea and vomiting.   Genitourinary: Negative for dysuria, frequency, hematuria and urgency.   Musculoskeletal: Positive for joint pain (Right shoulder). Negative for myalgias.   Skin: Negative.    Neurological: Negative for dizziness, speech change, focal weakness, loss of consciousness and headaches.   Endo/Heme/Allergies: Does not bruise/bleed easily.   Psychiatric/Behavioral: Negative.         Objective:   /80   Pulse 72   Ht 1.524 m (5')   Wt 68 kg (150 lb)   SpO2 95%   BMI 29.29 kg/m²      Physical Exam   Constitutional: She is oriented to person, place, and time. She appears well-developed and well-nourished.   HENT:   Head: Normocephalic and atraumatic.   Eyes: Pupils are equal, round, and reactive to light.   Neck: Normal range of motion. Neck supple. No thyromegaly present.   Cardiovascular: Normal rate, regular rhythm, normal heart sounds and intact distal pulses.  Exam reveals no gallop and no friction rub.    No murmur heard.  Pulmonary/Chest: Effort normal and breath sounds normal. No respiratory distress. She has no wheezes. She has no  rales. She exhibits no tenderness.   Abdominal: Soft. Bowel sounds are normal. She exhibits no distension. There is no tenderness. There is no guarding.   Musculoskeletal: Normal range of motion. She exhibits no edema.   Small incision adjacent to right shoulder appears to be healing well. No obvious sign of infection.   Neurological: She is alert and oriented to person, place, and time.   Skin: Skin is warm and dry.   Psychiatric: She has a normal mood and affect.       Assessment:     1. Atrial fibrillation, unspecified type (CMS-HCC)  EKG   2. Atrial fibrillation with RVR (CMS-HCC)     3. Essential hypertension         Medical Decision Making:  Today's Assessment / Status / Plan:     1. AF no recurrence on Amiodarone and Metoprolol.  Amiodarone will be decreased to 200 mgs daily.  2. HBP continue Metoprolol as prescribed.  Anticipate stopping amiodarone after next visit.  With isolated event in setting of infection and structurally normal heart.  RTC 6 weeks.  Released from cardiac perspective to go back to work at [employer]. Will need ID or surgical release as well to go back to lifting etc.  Collaborating MD  To      Makeda Jaquez M.D.  [address] [facility]  [address] [place] [id number]  VIA In Basket

## 2018-03-14 NOTE — PROGRESS NOTES
Subjective:   Urszula Persaud is a 57 y.o. female who presents today for review of her atrial arrhythmias, amiodarone therapy and hypertensive control.  She was admitted to [facility] on 2/7/18 with MSSA near the right shoulder. The shoulder was opened and lavaged and AB were prescribed by ID.  She developed AF with RVR and was seen by Dr Grace.  WES was ordered which demonstrated no evidence of endocarditis, EF 60% atrial sizes were normal. Heart was felt to be structurally normal.  She has felt no recurrence of the atrial fibrillation on the combination of Amiodarone and Metoprolol.  Her BP has been under good control on the Metoprolol as well. She has had no further fever or chills. She is being followed by Dr Perez.    Chief Complaint:Atrial fibrillation  Hypertension    History reviewed. No pertinent past medical history.  Past Surgical History:   Procedure Laterality Date   • INCISION AND DRAINAGE GENERAL Right 2/10/2018    Procedure: INCISION AND DRAINAGE NECK;  Surgeon: Millie Perez M.D.;  Location: SURGERY [facility];  Service: Ent   • VEIN LIGATION  2006     Family History   Problem Relation Age of Onset   • Diabetes Neg Hx    • Cancer Neg Hx      History   Smoking Status   • Never Smoker   Smokeless Tobacco   • Never Used     No Known Allergies  Outpatient Encounter Prescriptions as of 3/14/2018   Medication Sig Dispense Refill   • amiodarone (CORDARONE) 200 MG Tab Take 1 Tab by mouth every day. 30 Tab 3   • metoprolol (LOPRESSOR) 25 MG Tab Take 1 Tab by mouth 2 Times a Day. 60 Tab 3   • DAPTOmycin (CUBICIN) 500 MG Recon Soln by Intravenous route.     • Probiotic Product (PROBIOTIC PO) Take  by mouth.     • Acetaminophen (TYLENOL PO) Take  by mouth.     • [DISCONTINUED] amiodarone (CORDARONE) 200 MG Tab Take 1 Tab by mouth 2 Times a Day. 60 Tab 0   • [DISCONTINUED] metoprolol (LOPRESSOR) 25 MG Tab Take 1 Tab by mouth 2 Times a Day. 60 Tab 0   • cyclobenzaprine (FLEXERIL) 10 MG Tab  Take 1 Tab by mouth 3 times a day as needed. (Patient not taking: Reported on 3/14/2018) 30 Tab 0     No facility-administered encounter medications on file as of 3/14/2018.      Review of Systems   Constitutional: Negative for chills and fever.   HENT: Negative for sore throat.         No difficulty swallowing   Eyes: Negative for blurred vision and double vision.   Respiratory: Negative for cough, shortness of breath and wheezing.    Cardiovascular: Negative for chest pain, orthopnea, claudication, leg swelling and PND.   Gastrointestinal: Negative for abdominal pain, blood in stool, heartburn, nausea and vomiting.   Genitourinary: Negative for dysuria, frequency, hematuria and urgency.   Musculoskeletal: Positive for joint pain (Right shoulder). Negative for myalgias.   Skin: Negative.    Neurological: Negative for dizziness, speech change, focal weakness, loss of consciousness and headaches.   Endo/Heme/Allergies: Does not bruise/bleed easily.   Psychiatric/Behavioral: Negative.         Objective:   /80   Pulse 72   Ht 1.524 m (5')   Wt 68 kg (150 lb)   SpO2 95%   BMI 29.29 kg/m²     Physical Exam   Constitutional: She is oriented to person, place, and time. She appears well-developed and well-nourished.   HENT:   Head: Normocephalic and atraumatic.   Eyes: Pupils are equal, round, and reactive to light.   Neck: Normal range of motion. Neck supple. No thyromegaly present.   Cardiovascular: Normal rate, regular rhythm, normal heart sounds and intact distal pulses.  Exam reveals no gallop and no friction rub.    No murmur heard.  Pulmonary/Chest: Effort normal and breath sounds normal. No respiratory distress. She has no wheezes. She has no rales. She exhibits no tenderness.   Abdominal: Soft. Bowel sounds are normal. She exhibits no distension. There is no tenderness. There is no guarding.   Musculoskeletal: Normal range of motion. She exhibits no edema.   Small incision adjacent to right shoulder  appears to be healing well. No obvious sign of infection.   Neurological: She is alert and oriented to person, place, and time.   Skin: Skin is warm and dry.   Psychiatric: She has a normal mood and affect.       Assessment:     1. Atrial fibrillation, unspecified type (CMS-HCC)  EKG   2. Atrial fibrillation with RVR (CMS-HCC)     3. Essential hypertension         Medical Decision Making:  Today's Assessment / Status / Plan:     1. AF no recurrence on Amiodarone and Metoprolol.  Amiodarone will be decreased to 200 mgs daily.  2. HBP continue Metoprolol as prescribed.  Anticipate stopping amiodarone after next visit.  With isolated event in setting of infection and structurally normal heart.  RTC 6 weeks.  Released from cardiac perspective to go back to work at [employer]. Will need ID or surgical release as well to go back to lifting etc.  Collaborating MD  To

## 2018-04-19 ENCOUNTER — OFFICE VISIT (OUTPATIENT)
Dept: CARDIOLOGY | Facility: MEDICAL CENTER | Age: 58
End: 2018-04-19
Payer: COMMERCIAL

## 2018-04-19 VITALS
HEART RATE: 72 BPM | DIASTOLIC BLOOD PRESSURE: 74 MMHG | OXYGEN SATURATION: 99 % | BODY MASS INDEX: 29.45 KG/M2 | HEIGHT: 60 IN | WEIGHT: 150 LBS | SYSTOLIC BLOOD PRESSURE: 116 MMHG

## 2018-04-19 DIAGNOSIS — I48.91 ATRIAL FIBRILLATION WITH RVR (HCC): ICD-10-CM

## 2018-04-19 DIAGNOSIS — I48.0 PAF (PAROXYSMAL ATRIAL FIBRILLATION) (HCC): ICD-10-CM

## 2018-04-19 DIAGNOSIS — Z79.899 HIGH RISK MEDICATION USE: ICD-10-CM

## 2018-04-19 PROCEDURE — 93000 ELECTROCARDIOGRAM COMPLETE: CPT | Performed by: NURSE PRACTITIONER

## 2018-04-19 PROCEDURE — 99214 OFFICE O/P EST MOD 30 MIN: CPT | Performed by: NURSE PRACTITIONER

## 2018-04-19 RX ORDER — AMIODARONE HYDROCHLORIDE 200 MG/1
100 TABLET ORAL DAILY
Qty: 30 TAB | Refills: 3 | Status: SHIPPED | OUTPATIENT
Start: 2018-04-19 | End: 2018-05-31

## 2018-04-19 RX ORDER — AMLODIPINE BESYLATE 5 MG/1
TABLET ORAL
COMMUNITY
Start: 2018-04-15 | End: 2018-04-19

## 2018-04-19 ASSESSMENT — ENCOUNTER SYMPTOMS
CHILLS: 0
SPEECH CHANGE: 0
COUGH: 0
MYALGIAS: 0
FEVER: 0
WHEEZING: 0
BRUISES/BLEEDS EASILY: 0
VOMITING: 0
HEARTBURN: 0
SHORTNESS OF BREATH: 0
LOSS OF CONSCIOUSNESS: 0
DIZZINESS: 1
CLAUDICATION: 0
BLURRED VISION: 0
DOUBLE VISION: 0
ORTHOPNEA: 0
FOCAL WEAKNESS: 0
PSYCHIATRIC NEGATIVE: 1
HEADACHES: 0
BLOOD IN STOOL: 0
PND: 0
NAUSEA: 0
ABDOMINAL PAIN: 0
SORE THROAT: 0
PALPITATIONS: 0

## 2018-04-19 NOTE — PROGRESS NOTES
Chief Complaint   Patient presents with   • Atrial Fibrillation     F/V 6WK DX:PAF       Subjective:   Urszula Persaud is a 57 y.o. female who presents today for Review of her AF that occurred during her hospital stay for abcess of the right shoulder and MSSA +.  Amiodarone reverted patient and she continues on maintenance dose of same.  Some vertigo with rolling over in bed may be related to Amio.  Echo reviewed demonstrating normal LA and RA sizes and normal EF.  RSVP 45 mmHg.      Per ID  Interval History: 57 y.o.  female who has history of hypertension.  Pt started having pain in her right shoulder and neck about 5-6 days prior to hospitalization, the pain was exacerbated by palpation and taking deep breaths.  She went to urgent care on 2/6/2018 and was sent home with the muscle relaxant and a steroid.  Unfortunately, the pain worsened and the pt was hospitalized from 2/7- 2/16/18, admitted for swelling on the right side of her neck with a WBC count of 18,000.  Bcx on 2/7 +MSSA.  CT chest 2/7 +PNA.  Repeat Bcx negative on 2/12.  TTE and WES were negative.  CT of neck on 2/10 +abscess, pt underwent I&D with Dr. Perez.  OR cx on 2/10 +MSSA.  Discharged home on IV Daptomyin through [facility], end date 3/12/18.   IV dapto completed on 3/12/18 and PICC removed as well. Dr. Perez reviewed and signed off.    History reviewed. No pertinent past medical history.  Past Surgical History:   Procedure Laterality Date   • INCISION AND DRAINAGE GENERAL Right 2/10/2018    Procedure: INCISION AND DRAINAGE NECK;  Surgeon: Millie Perez M.D.;  Location: SURGERY [facility];  Service: Ent   • VEIN LIGATION  2006     Family History   Problem Relation Age of Onset   • Diabetes Neg Hx    • Cancer Neg Hx      Social History     Social History   • Marital status: Single     Spouse name: N/A   • Number of children: N/A   • Years of education: N/A     Occupational History   • Not on file.     Social History Main Topics   •  Smoking status: Never Smoker   • Smokeless tobacco: Never Used   • Alcohol use No   • Drug use: No   • Sexual activity: No      Comment:       Other Topics Concern   • Not on file     Social History Narrative   • No narrative on file     No Known Allergies  Outpatient Encounter Prescriptions as of 4/19/2018   Medication Sig Dispense Refill   • amiodarone (CORDARONE) 200 MG Tab Take 1 Tab by mouth every day. 30 Tab 3   • metoprolol (LOPRESSOR) 25 MG Tab Take 1 Tab by mouth 2 Times a Day. 60 Tab 3   • Probiotic Product (PROBIOTIC PO) Take  by mouth.     • amLODIPine (NORVASC) 5 MG Tab      • DAPTOmycin (CUBICIN) 500 MG Recon Soln by Intravenous route.     • Acetaminophen (TYLENOL PO) Take  by mouth.     • cyclobenzaprine (FLEXERIL) 10 MG Tab Take 1 Tab by mouth 3 times a day as needed. (Patient not taking: Reported on 3/14/2018) 30 Tab 0     No facility-administered encounter medications on file as of 4/19/2018.      Review of Systems   Constitutional: Negative for chills and fever.   HENT: Negative for sore throat.         No difficulty swallowing   Eyes: Negative for blurred vision and double vision.   Respiratory: Negative for cough, shortness of breath and wheezing.    Cardiovascular: Negative for chest pain, palpitations, orthopnea, claudication, leg swelling and PND.   Gastrointestinal: Negative for abdominal pain, blood in stool, heartburn, nausea and vomiting.   Genitourinary: Negative for dysuria, frequency, hematuria and urgency.   Musculoskeletal: Negative for myalgias.   Skin: Negative.    Neurological: Positive for dizziness. Negative for speech change, focal weakness, loss of consciousness and headaches.   Endo/Heme/Allergies: Does not bruise/bleed easily.   Psychiatric/Behavioral: Negative.         Objective:   /74   Pulse 72   Ht 1.524 m (5')   Wt 68 kg (150 lb)   SpO2 99%   BMI 29.29 kg/m²     Physical Exam   Constitutional: She is oriented to person, place, and time. She appears  well-developed and well-nourished.   HENT:   Head: Normocephalic and atraumatic.   Eyes: Pupils are equal, round, and reactive to light.   Neck: Normal range of motion. Neck supple.   Cardiovascular: Normal rate and regular rhythm.    Pulmonary/Chest: Effort normal and breath sounds normal.   Abdominal: Soft. Bowel sounds are normal.   Musculoskeletal: Normal range of motion.   Neurological: She is alert and oriented to person, place, and time.   Skin: Skin is warm and dry.   Psychiatric: She has a normal mood and affect.       Assessment:     1. PAF (paroxysmal atrial fibrillation) (CMS-[facility])  EKG   2. Atrial fibrillation with RVR (CMS-[facility])     3. High risk medication use         Medical Decision Making:  Today's Assessment / Status / Plan:     1. PAF no recurrence on Amiodarone 200 mgs daily.  2. High risk medication will reduce to 100 mgs daily with complaints of positional vertigo.  Will stop medication after 3 months and then obtain Zio or medi lynx to determine if AF is recurrent.  OPO with elevated RVSP on echo.    RTC 5 weeks    Collaborating MD Trevino

## 2018-04-21 LAB — EKG IMPRESSION: NORMAL

## 2018-05-02 ENCOUNTER — TELEPHONE (OUTPATIENT)
Dept: CARDIOLOGY | Facility: MEDICAL CENTER | Age: 58
End: 2018-05-02

## 2018-05-02 NOTE — TELEPHONE ENCOUNTER
OPO order faxed to [facility].  Ins; [facility]  T: [phone]  F: [phone]

## 2018-05-04 ENCOUNTER — TELEPHONE (OUTPATIENT)
Dept: MEDICAL GROUP | Facility: PHYSICIAN GROUP | Age: 58
End: 2018-05-04

## 2018-05-04 ENCOUNTER — HOSPITAL ENCOUNTER (OUTPATIENT)
Dept: RADIOLOGY | Facility: MEDICAL CENTER | Age: 58
End: 2018-05-04
Attending: FAMILY MEDICINE
Payer: COMMERCIAL

## 2018-05-04 DIAGNOSIS — Z12.39 SCREENING FOR BREAST CANCER: ICD-10-CM

## 2018-05-04 PROCEDURE — 77067 SCR MAMMO BI INCL CAD: CPT

## 2018-05-04 NOTE — TELEPHONE ENCOUNTER
----- Message from Makeda Jaquez M.D. sent at 5/4/2018 12:01 PM PDT -----  Your mammogram is normal.  Please repeat exam in 1 year.

## 2018-05-11 ENCOUNTER — TELEPHONE (OUTPATIENT)
Dept: CARDIOLOGY | Facility: MEDICAL CENTER | Age: 58
End: 2018-05-11

## 2018-05-11 DIAGNOSIS — R09.89 ABNORMAL PULMONARY FINDING: ICD-10-CM

## 2018-05-11 NOTE — TELEPHONE ENCOUNTER
Message     Referral was sent to [facility]   [address] [place] [address] [place] [address]   [phone]

## 2018-05-11 NOTE — TELEPHONE ENCOUNTER
----- Message from CASSIE Gacria sent at 5/11/2018 10:25 AM PDT -----  Patient needs referral to pulmonary for suspected WALDEMAR

## 2018-05-24 ENCOUNTER — OFFICE VISIT (OUTPATIENT)
Dept: MEDICAL GROUP | Facility: PHYSICIAN GROUP | Age: 58
End: 2018-05-24
Payer: COMMERCIAL

## 2018-05-24 VITALS
HEART RATE: 63 BPM | DIASTOLIC BLOOD PRESSURE: 80 MMHG | OXYGEN SATURATION: 96 % | HEIGHT: 60 IN | SYSTOLIC BLOOD PRESSURE: 128 MMHG | RESPIRATION RATE: 16 BRPM | BODY MASS INDEX: 28.66 KG/M2 | TEMPERATURE: 98.6 F | WEIGHT: 146 LBS

## 2018-05-24 DIAGNOSIS — Z23 NEED FOR TDAP VACCINATION: ICD-10-CM

## 2018-05-24 DIAGNOSIS — R05.9 COUGH: ICD-10-CM

## 2018-05-24 PROCEDURE — 90715 TDAP VACCINE 7 YRS/> IM: CPT | Performed by: INTERNAL MEDICINE

## 2018-05-24 PROCEDURE — 99214 OFFICE O/P EST MOD 30 MIN: CPT | Mod: 25 | Performed by: INTERNAL MEDICINE

## 2018-05-24 PROCEDURE — 90471 IMMUNIZATION ADMIN: CPT | Performed by: INTERNAL MEDICINE

## 2018-05-24 NOTE — ASSESSMENT & PLAN NOTE
Dry cough with itchy throat for the past 3 weeks. Denies congestion, fevers, chills, sore throat. The cough is a dry cough. She's been on amiodarone for 3 months. She says the plan is to discontinue the amiodarone at the end of the month. She's been working at the [employer] for the past 11 months which is [employer]. She takes cough syrup and drops which helps a little bit.

## 2018-05-24 NOTE — PROGRESS NOTES
PRIMARY CARE CLINIC FOLLOW UP VISIT  Chief Complaint   Patient presents with   • Cough     x 3 weeks      History of Present Illness     Cough  Dry cough with itchy throat for the past 3 weeks. Denies congestion, fevers, chills, sore throat. The cough is a dry cough. She's been on amiodarone for 3 months. She says the plan is to discontinue the amiodarone at the end of the month. 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 She takes cough syrup and drops which helps a little bit.       Current Outpatient Prescriptions   Medication Sig Dispense Refill   • amiodarone (CORDARONE) 200 MG Tab Take 0.5 Tabs by mouth every day. 30 Tab 3   • metoprolol (LOPRESSOR) 25 MG Tab Take 1 Tab by mouth 2 Times a Day. 60 Tab 3   • Probiotic Product (PROBIOTIC PO) Take  by mouth.       No current facility-administered medications for this visit.      ROS  As per HPI above. All other systems reviewed and negative.        Objective   Blood pressure 128/80, pulse 63, temperature 37 °C (98.6 °F), resp. rate 16, height 1.524 m (5'), weight 66.2 kg (146 lb), SpO2 96 %. Body mass index is 28.51 kg/m².    General: alert and oriented, pleasant, cooperative. Occasionally coughing   HEENT: Normocephalic, atraumatic. No thyroid masses. Oropharynx clear without exudate or injection.   Cardiovascular: regular rate and rhythm, normal S1/S2  Pulmonary: lungs clear to auscultation bilaterally  Lymphatics: no cervical or supraclavicular lymphadenopathy   Skin: warm and dry, no lesions or rashes  Psychiatric: appropriate mood and affect. Good insight and appropriate judgment       Assessment and Plan   The following treatment plan was discussed     1. Cough  May be related to amiodarone although she's been on a low dose for just a few months; seems as though cardiology has plans to discontinue this anyway after 3 months of use and will see if symptoms improve after. She is also at risk for inhalational exposure given  her job; advised her to wear a mask to see if this reduces exposure and lessens her symptoms. Will check PFTs as well.   - PULMONARY FUNCTION TESTS Test requested: Complete Pulmonary Function Test    2. Need for Tdap vaccination  Given today.   - TDAP VACCINE =>8YO IM    Return if symptoms worsen or fail to improve.    Emmanuel Maldonado MD  Internal Medicine  [facility]

## 2018-05-31 ENCOUNTER — OFFICE VISIT (OUTPATIENT)
Dept: CARDIOLOGY | Facility: MEDICAL CENTER | Age: 58
End: 2018-05-31
Payer: COMMERCIAL

## 2018-05-31 VITALS
OXYGEN SATURATION: 98 % | BODY MASS INDEX: 28.78 KG/M2 | DIASTOLIC BLOOD PRESSURE: 64 MMHG | HEIGHT: 60 IN | WEIGHT: 146.6 LBS | SYSTOLIC BLOOD PRESSURE: 118 MMHG | HEART RATE: 70 BPM

## 2018-05-31 DIAGNOSIS — I48.0 PAF (PAROXYSMAL ATRIAL FIBRILLATION) (HCC): ICD-10-CM

## 2018-05-31 DIAGNOSIS — Z79.899 HIGH RISK MEDICATION USE: ICD-10-CM

## 2018-05-31 DIAGNOSIS — R05.9 COUGH: ICD-10-CM

## 2018-05-31 DIAGNOSIS — I10 ESSENTIAL HYPERTENSION: ICD-10-CM

## 2018-05-31 PROCEDURE — 99214 OFFICE O/P EST MOD 30 MIN: CPT | Performed by: NURSE PRACTITIONER

## 2018-05-31 PROCEDURE — 93000 ELECTROCARDIOGRAM COMPLETE: CPT | Performed by: NURSE PRACTITIONER

## 2018-05-31 RX ORDER — LORATADINE 10 MG/1
CAPSULE, LIQUID FILLED ORAL
COMMUNITY
End: 2018-12-27

## 2018-05-31 RX ORDER — AMLODIPINE BESYLATE 5 MG/1
5 TABLET ORAL DAILY
COMMUNITY
Start: 2018-04-29 | End: 2020-04-30

## 2018-05-31 ASSESSMENT — ENCOUNTER SYMPTOMS
SPEECH CHANGE: 0
WHEEZING: 0
HEADACHES: 0
LOSS OF CONSCIOUSNESS: 0
BLURRED VISION: 0
PALPITATIONS: 0
HEARTBURN: 0
BRUISES/BLEEDS EASILY: 0
PND: 0
CLAUDICATION: 0
ORTHOPNEA: 0
VOMITING: 0
FOCAL WEAKNESS: 0
DOUBLE VISION: 0
ABDOMINAL PAIN: 0
COUGH: 1
NAUSEA: 0
BLOOD IN STOOL: 0
SHORTNESS OF BREATH: 0
MYALGIAS: 0
SORE THROAT: 0
FEVER: 0
PSYCHIATRIC NEGATIVE: 1
DIZZINESS: 0
CHILLS: 0

## 2018-05-31 NOTE — LETTER
[facility]   [address]  [place] [address]  Phone: [phone]  Fax: [phone]              Urszula Persaud  1960    Encounter Date: 5/31/2018    CASSIE Garcia          PROGRESS NOTE:  Chief Complaint   Patient presents with   • Atrial Fibrillation       Subjective:   Urszula Persaud is a 57 y.o. female who presents today for Review of her AF that occurred during her hospital stay for abcess of the right shoulder and MSSA +.  Amiodarone reverted patient and she continues on maintenance dose of same.  Some vertigo with rolling over in bed may be related to Amio. Therefore, dose reduced on last visit to 100 mgs .  Cough now and concerned it may be Amiodarone. Will stop agent and obtain Zio in 3 months when she returns in follow up.  She is back at work full time.    Echo reviewed demonstrating normal LA and RA sizes and normal EF.  RSVP 45 mmHg.       Per ID from hospitalization:  Interval History: 57 y.o.  female who has history of hypertension.  Pt started having pain in her right shoulder and neck about 5-6 days prior to hospitalization, the pain was exacerbated by palpation and taking deep breaths.  She went to urgent care on 2/6/2018 and was sent home with the muscle relaxant and a steroid.  Unfortunately, the pain worsened and the pt was hospitalized from 2/7- 2/16/18, admitted for swelling on the right side of her neck with a WBC count of 18,000.  Bcx on 2/7 +MSSA.  CT chest 2/7 +PNA.  Repeat Bcx negative on 2/12.  TTE and WES were negative.  CT of neck on 2/10 +abscess, pt underwent I&D with Dr. Perez.  OR cx on 2/10 +MSSA.  Discharged home on IV Daptomyin through [facility], end date 3/12/18.   IV dapto completed on 3/12/18 and PICC removed as well. Dr. Perez reviewed and signed off.     History reviewed. No pertinent past medical history.  Past Surgical History:   Procedure Laterality Date   • INCISION AND DRAINAGE GENERAL  Right 2/10/2018    Procedure: INCISION AND DRAINAGE NECK;  Surgeon: Millie Perez M.D.;  Location: SURGERY [facility];  Service: Ent   • VEIN LIGATION  2006     Family History   Problem Relation Age of Onset   • Diabetes Neg Hx    • Cancer Neg Hx      Social History     Social History   • Marital status: Single     Spouse name: N/A   • Number of children: N/A   • Years of education: N/A     Occupational History   • Not on file.     Social History Main Topics   • Smoking status: Never Smoker   • Smokeless tobacco: Never Used   • Alcohol use No   • Drug use: No   • Sexual activity: No      Comment:       Other Topics Concern   • Not on file     Social History Narrative   • No narrative on file     No Known Allergies  Outpatient Encounter Prescriptions as of 5/31/2018   Medication Sig Dispense Refill   • metoprolol (LOPRESSOR) 25 MG Tab Take 1 Tab by mouth 2 Times a Day. 60 Tab 3   • Probiotic Product (PROBIOTIC PO) Take  by mouth.     • amLODIPine (NORVASC) 5 MG Tab      • Loratadine (CLARITIN) 10 MG Cap Take  by mouth.     • Pseudoephedrine-Guaifenesin (MUCINEX D MAX STRENGTH PO) Take  by mouth.     • [DISCONTINUED] amiodarone (CORDARONE) 200 MG Tab Take 0.5 Tabs by mouth every day. 30 Tab 3     No facility-administered encounter medications on file as of 5/31/2018.      Review of Systems   Constitutional: Negative for chills and fever.   HENT: Negative for sore throat.         No difficulty swallowing   Eyes: Negative for blurred vision and double vision.   Respiratory: Positive for cough. Negative for shortness of breath and wheezing.    Cardiovascular: Negative for chest pain, palpitations, orthopnea, claudication, leg swelling and PND.   Gastrointestinal: Negative for abdominal pain, blood in stool, heartburn, nausea and vomiting.   Genitourinary: Negative for dysuria, frequency, hematuria and urgency.   Musculoskeletal: Negative for myalgias.   Skin: Negative.    Neurological: Negative for  dizziness, speech change, focal weakness, loss of consciousness and headaches.   Endo/Heme/Allergies: Does not bruise/bleed easily.   Psychiatric/Behavioral: Negative.         Objective:   /64   Pulse 70   Ht 1.524 m (5')   Wt 66.5 kg (146 lb 9.6 oz)   SpO2 98%   BMI 28.63 kg/m²      Physical Exam   Constitutional: She is oriented to person, place, and time. She appears well-developed and well-nourished.   HENT:   Head: Normocephalic and atraumatic.   Eyes: Pupils are equal, round, and reactive to light.   Neck: Normal range of motion. Neck supple.   Cardiovascular: Normal rate and regular rhythm.    Pulmonary/Chest: Effort normal and breath sounds normal.   Abdominal: Soft. Bowel sounds are normal.   Musculoskeletal: Normal range of motion.   Neurological: She is alert and oriented to person, place, and time.   Skin: Skin is warm and dry.   Psychiatric: She has a normal mood and affect.       Assessment:     1. PAF (paroxysmal atrial fibrillation) ([facility])  EKG    HOLTER MONITOR / EVENT RECORDER   2. Essential hypertension     3. High risk medication use     4. Cough         Medical Decision Making:  Today's Assessment / Status / Plan:     1.PAF no recurrence on low dose Amiodarone. Vertigo abated.  2. HBP stable on current medical regimen.  3. High risk medication. No overt SOB but cough.  4.Cough stop Amiodarone if it persists off Amiodarone then would stop Metoprolol and get CXR. Will need another medication for her hypertension as well if Metoprolol stopped.  RTC 3 months for follow up and Zio.    Collaborating MD Ramona Jaquez M.D.  [address] [facility]  [address] [place] [id number]  VIA In Basket

## 2018-06-01 NOTE — PROGRESS NOTES
Chief Complaint   Patient presents with   • Atrial Fibrillation       Subjective:   Urszula Persaud is a 57 y.o. female who presents today for Review of her AF that occurred during her hospital stay for abcess of the right shoulder and MSSA +.  Amiodarone reverted patient and she continues on maintenance dose of same.  Some vertigo with rolling over in bed may be related to Amio. Therefore, dose reduced on last visit to 100 mgs .  Cough now and concerned it may be Amiodarone. Will stop agent and obtain Zio in 3 months when she returns in follow up.  She is back at work full time.    Echo reviewed demonstrating normal LA and RA sizes and normal EF.  RSVP 45 mmHg.       Per ID from hospitalization:  Interval History: 57 y.o.  female who has history of hypertension.  Pt started having pain in her right shoulder and neck about 5-6 days prior to hospitalization, the pain was exacerbated by palpation and taking deep breaths.  She went to urgent care on 2/6/2018 and was sent home with the muscle relaxant and a steroid.  Unfortunately, the pain worsened and the pt was hospitalized from 2/7- 2/16/18, admitted for swelling on the right side of her neck with a WBC count of 18,000.  Bcx on 2/7 +MSSA.  CT chest 2/7 +PNA.  Repeat Bcx negative on 2/12.  TTE and WES were negative.  CT of neck on 2/10 +abscess, pt underwent I&D with Dr. Perez.  OR cx on 2/10 +MSSA.  Discharged home on IV Daptomyin through [facility], end date 3/12/18.   IV dapto completed on 3/12/18 and PICC removed as well. Dr. Perez reviewed and signed off.     History reviewed. No pertinent past medical history.  Past Surgical History:   Procedure Laterality Date   • INCISION AND DRAINAGE GENERAL Right 2/10/2018    Procedure: INCISION AND DRAINAGE NECK;  Surgeon: Millie Perez M.D.;  Location: SURGERY [facility];  Service: Ent   • VEIN LIGATION  2006     Family History   Problem Relation Age of Onset   • Diabetes Neg Hx    • Cancer Neg Hx       Social History     Social History   • Marital status: Single     Spouse name: N/A   • Number of children: N/A   • Years of education: N/A     Occupational History   • Not on file.     Social History Main Topics   • Smoking status: Never Smoker   • Smokeless tobacco: Never Used   • Alcohol use No   • Drug use: No   • Sexual activity: No      Comment:       Other Topics Concern   • Not on file     Social History Narrative   • No narrative on file     No Known Allergies  Outpatient Encounter Prescriptions as of 5/31/2018   Medication Sig Dispense Refill   • metoprolol (LOPRESSOR) 25 MG Tab Take 1 Tab by mouth 2 Times a Day. 60 Tab 3   • Probiotic Product (PROBIOTIC PO) Take  by mouth.     • amLODIPine (NORVASC) 5 MG Tab      • Loratadine (CLARITIN) 10 MG Cap Take  by mouth.     • Pseudoephedrine-Guaifenesin (MUCINEX D MAX STRENGTH PO) Take  by mouth.     • [DISCONTINUED] amiodarone (CORDARONE) 200 MG Tab Take 0.5 Tabs by mouth every day. 30 Tab 3     No facility-administered encounter medications on file as of 5/31/2018.      Review of Systems   Constitutional: Negative for chills and fever.   HENT: Negative for sore throat.         No difficulty swallowing   Eyes: Negative for blurred vision and double vision.   Respiratory: Positive for cough. Negative for shortness of breath and wheezing.    Cardiovascular: Negative for chest pain, palpitations, orthopnea, claudication, leg swelling and PND.   Gastrointestinal: Negative for abdominal pain, blood in stool, heartburn, nausea and vomiting.   Genitourinary: Negative for dysuria, frequency, hematuria and urgency.   Musculoskeletal: Negative for myalgias.   Skin: Negative.    Neurological: Negative for dizziness, speech change, focal weakness, loss of consciousness and headaches.   Endo/Heme/Allergies: Does not bruise/bleed easily.   Psychiatric/Behavioral: Negative.         Objective:   /64   Pulse 70   Ht 1.524 m (5')   Wt 66.5 kg (146 lb 9.6 oz)    SpO2 98%   BMI 28.63 kg/m²     Physical Exam   Constitutional: She is oriented to person, place, and time. She appears well-developed and well-nourished.   HENT:   Head: Normocephalic and atraumatic.   Eyes: Pupils are equal, round, and reactive to light.   Neck: Normal range of motion. Neck supple.   Cardiovascular: Normal rate and regular rhythm.    Pulmonary/Chest: Effort normal and breath sounds normal.   Abdominal: Soft. Bowel sounds are normal.   Musculoskeletal: Normal range of motion.   Neurological: She is alert and oriented to person, place, and time.   Skin: Skin is warm and dry.   Psychiatric: She has a normal mood and affect.       Assessment:     1. PAF (paroxysmal atrial fibrillation) ([facility])  EKG    HOLTER MONITOR / EVENT RECORDER   2. Essential hypertension     3. High risk medication use     4. Cough         Medical Decision Making:  Today's Assessment / Status / Plan:     1.PAF no recurrence on low dose Amiodarone. Vertigo abated.  2. HBP stable on current medical regimen.  3. High risk medication. No overt SOB but cough.  4.Cough stop Amiodarone if it persists off Amiodarone then would stop Metoprolol and get CXR. Will need another medication for her hypertension as well if Metoprolol stopped.  RTC 3 months for follow up and Yfno.    Collaborating MD [place]

## 2018-06-02 LAB — EKG IMPRESSION: NORMAL

## 2018-06-12 ENCOUNTER — HOSPITAL ENCOUNTER (OUTPATIENT)
Facility: MEDICAL CENTER | Age: 58
End: 2018-06-12
Attending: FAMILY MEDICINE
Payer: COMMERCIAL

## 2018-06-12 PROCEDURE — 82274 ASSAY TEST FOR BLOOD FECAL: CPT

## 2018-06-18 LAB — HEMOCCULT STL QL IA: NEGATIVE

## 2018-06-20 ENCOUNTER — HOSPITAL ENCOUNTER (OUTPATIENT)
Dept: LAB | Facility: MEDICAL CENTER | Age: 58
End: 2018-06-20
Attending: FAMILY MEDICINE
Payer: COMMERCIAL

## 2018-06-20 ENCOUNTER — TELEPHONE (OUTPATIENT)
Dept: MEDICAL GROUP | Facility: PHYSICIAN GROUP | Age: 58
End: 2018-06-20

## 2018-06-20 DIAGNOSIS — Z13.6 SCREENING FOR CARDIOVASCULAR CONDITION: ICD-10-CM

## 2018-06-20 DIAGNOSIS — Z13.29 SCREENING FOR ENDOCRINE DISORDER: ICD-10-CM

## 2018-06-20 DIAGNOSIS — I48.91 ATRIAL FIBRILLATION WITH RVR (HCC): ICD-10-CM

## 2018-06-20 DIAGNOSIS — I10 ESSENTIAL HYPERTENSION: ICD-10-CM

## 2018-06-20 LAB
25(OH)D3 SERPL-MCNC: 26 NG/ML (ref 30–100)
ALBUMIN SERPL BCP-MCNC: 4.4 G/DL (ref 3.2–4.9)
ALBUMIN/GLOB SERPL: 1.6 G/DL
ALP SERPL-CCNC: 70 U/L (ref 30–99)
ALT SERPL-CCNC: 35 U/L (ref 2–50)
ANION GAP SERPL CALC-SCNC: 8 MMOL/L (ref 0–11.9)
AST SERPL-CCNC: 24 U/L (ref 12–45)
BASOPHILS # BLD AUTO: 0.3 % (ref 0–1.8)
BASOPHILS # BLD: 0.02 K/UL (ref 0–0.12)
BILIRUB SERPL-MCNC: 1 MG/DL (ref 0.1–1.5)
BUN SERPL-MCNC: 12 MG/DL (ref 8–22)
CALCIUM SERPL-MCNC: 9.4 MG/DL (ref 8.5–10.5)
CHLORIDE SERPL-SCNC: 104 MMOL/L (ref 96–112)
CHOLEST SERPL-MCNC: 190 MG/DL (ref 100–199)
CO2 SERPL-SCNC: 28 MMOL/L (ref 20–33)
CREAT SERPL-MCNC: 0.73 MG/DL (ref 0.5–1.4)
EOSINOPHIL # BLD AUTO: 0.15 K/UL (ref 0–0.51)
EOSINOPHIL NFR BLD: 2.2 % (ref 0–6.9)
ERYTHROCYTE [DISTWIDTH] IN BLOOD BY AUTOMATED COUNT: 52.5 FL (ref 35.9–50)
GLOBULIN SER CALC-MCNC: 2.8 G/DL (ref 1.9–3.5)
GLUCOSE SERPL-MCNC: 95 MG/DL (ref 65–99)
HCT VFR BLD AUTO: 39.7 % (ref 37–47)
HDLC SERPL-MCNC: 62 MG/DL
HGB BLD-MCNC: 14 G/DL (ref 12–16)
IMM GRANULOCYTES # BLD AUTO: 0.02 K/UL (ref 0–0.11)
IMM GRANULOCYTES NFR BLD AUTO: 0.3 % (ref 0–0.9)
LDLC SERPL CALC-MCNC: 106 MG/DL
LYMPHOCYTES # BLD AUTO: 3.46 K/UL (ref 1–4.8)
LYMPHOCYTES NFR BLD: 50.6 % (ref 22–41)
MCH RBC QN AUTO: 32.9 PG (ref 27–33)
MCHC RBC AUTO-ENTMCNC: 35.3 G/DL (ref 33.6–35)
MCV RBC AUTO: 93.4 FL (ref 81.4–97.8)
MONOCYTES # BLD AUTO: 0.51 K/UL (ref 0–0.85)
MONOCYTES NFR BLD AUTO: 7.5 % (ref 0–13.4)
NEUTROPHILS # BLD AUTO: 2.68 K/UL (ref 2–7.15)
NEUTROPHILS NFR BLD: 39.1 % (ref 44–72)
NRBC # BLD AUTO: 0.03 K/UL
NRBC BLD-RTO: 0.4 /100 WBC
PLATELET # BLD AUTO: 234 K/UL (ref 164–446)
PMV BLD AUTO: 9.7 FL (ref 9–12.9)
POTASSIUM SERPL-SCNC: 3.6 MMOL/L (ref 3.6–5.5)
PROT SERPL-MCNC: 7.2 G/DL (ref 6–8.2)
RBC # BLD AUTO: 4.25 M/UL (ref 4.2–5.4)
SODIUM SERPL-SCNC: 140 MMOL/L (ref 135–145)
T4 FREE SERPL-MCNC: 0.97 NG/DL (ref 0.53–1.43)
TRIGL SERPL-MCNC: 111 MG/DL (ref 0–149)
TSH SERPL DL<=0.005 MIU/L-ACNC: 0.18 UIU/ML (ref 0.38–5.33)
WBC # BLD AUTO: 6.8 K/UL (ref 4.8–10.8)

## 2018-06-20 PROCEDURE — 84443 ASSAY THYROID STIM HORMONE: CPT

## 2018-06-20 PROCEDURE — 84439 ASSAY OF FREE THYROXINE: CPT

## 2018-06-20 PROCEDURE — 36415 COLL VENOUS BLD VENIPUNCTURE: CPT

## 2018-06-20 PROCEDURE — 85025 COMPLETE CBC W/AUTO DIFF WBC: CPT

## 2018-06-20 PROCEDURE — 80053 COMPREHEN METABOLIC PANEL: CPT

## 2018-06-20 PROCEDURE — 80061 LIPID PANEL: CPT

## 2018-06-20 PROCEDURE — 82306 VITAMIN D 25 HYDROXY: CPT

## 2018-06-20 NOTE — TELEPHONE ENCOUNTER
----- Message from Makeda Jaquez M.D. sent at 6/20/2018  3:43 PM PDT -----  Patient needs follow up appointment for labs.

## 2018-06-26 ENCOUNTER — TELEPHONE (OUTPATIENT)
Dept: CARDIOLOGY | Facility: MEDICAL CENTER | Age: 58
End: 2018-06-26

## 2018-06-26 NOTE — TELEPHONE ENCOUNTER
----- Message from Safia Ron sent at 6/26/2018  8:48 AM PDT -----  Regarding: calling to discuss changing her Metoprolol medication  YOSSI/Carol      Patient is calling to discuss changing her Metoprolol medication, she said it's not working. She can be reached at [phone].

## 2018-06-26 NOTE — TELEPHONE ENCOUNTER
Pt. Called to report her cough persists in spite of stopping Amiodarone. She is wondering if Guerita wants to stop her Lopressor 25mg BID.   To Guerita. Do you wan to order CXR?

## 2018-06-27 DIAGNOSIS — R05.9 COUGH: ICD-10-CM

## 2018-06-27 NOTE — TELEPHONE ENCOUNTER
Pt was notified. She will stop metoprolol & report back if any problems w/ rhythm. CXR order placed. Will go to [address]        CASSIE Garcia L.P.N. 6 hours ago (10:30 AM)        Needs two view CXR . May stop Metoprolol as well. Needs to report any arrhythmias. (Routing comment)

## 2018-06-28 ENCOUNTER — HOSPITAL ENCOUNTER (OUTPATIENT)
Dept: RADIOLOGY | Facility: MEDICAL CENTER | Age: 58
End: 2018-06-28
Attending: NURSE PRACTITIONER
Payer: COMMERCIAL

## 2018-06-28 DIAGNOSIS — R05.9 COUGH: ICD-10-CM

## 2018-06-28 PROCEDURE — 71046 X-RAY EXAM CHEST 2 VIEWS: CPT

## 2018-06-29 ENCOUNTER — TELEPHONE (OUTPATIENT)
Dept: CARDIOLOGY | Facility: MEDICAL CENTER | Age: 58
End: 2018-06-29

## 2018-06-29 NOTE — TELEPHONE ENCOUNTER
----- Message from CASSIE Garcia sent at 6/29/2018 11:04 AM PDT -----  CXR good . Off Metoprolol is cough improving? If not needs to see PCP for review of cough off meds.

## 2018-07-03 NOTE — TELEPHONE ENCOUNTER
Called pt. To advise. Cough is improved but not resolved. Pt. Instructed to call PCP if sx. Don't totally resolve.

## 2018-07-19 ENCOUNTER — OFFICE VISIT (OUTPATIENT)
Dept: MEDICAL GROUP | Facility: PHYSICIAN GROUP | Age: 58
End: 2018-07-19
Payer: COMMERCIAL

## 2018-07-19 VITALS
OXYGEN SATURATION: 99 % | TEMPERATURE: 97.7 F | SYSTOLIC BLOOD PRESSURE: 116 MMHG | WEIGHT: 144 LBS | BODY MASS INDEX: 28.27 KG/M2 | HEART RATE: 76 BPM | RESPIRATION RATE: 16 BRPM | DIASTOLIC BLOOD PRESSURE: 82 MMHG | HEIGHT: 60 IN

## 2018-07-19 DIAGNOSIS — I10 ESSENTIAL HYPERTENSION: ICD-10-CM

## 2018-07-19 DIAGNOSIS — I48.91 ATRIAL FIBRILLATION WITH RVR (HCC): ICD-10-CM

## 2018-07-19 DIAGNOSIS — E55.9 VITAMIN D DEFICIENCY: ICD-10-CM

## 2018-07-19 DIAGNOSIS — R94.6 THYROID FUNCTION STUDY ABNORMALITY: ICD-10-CM

## 2018-07-19 PROCEDURE — 99214 OFFICE O/P EST MOD 30 MIN: CPT | Performed by: FAMILY MEDICINE

## 2018-07-19 NOTE — PROGRESS NOTES
Chief Complaint   Patient presents with   • Vitamin D Deficiency     fv labs       HISTORY OF PRESENT ILLNESS: Patient is a 57 y.o. female established patient here today for the following concerns:    1. Thyroid function study abnormality  Here for follow up.  THyroid function testing appears slightly abnormal, but patient had been quite sick with chest wall cellulitis/abscess and afib with RVR.  SHe reports feeling well now with no anterior neck pain, hair loss, or worsening palpitations.  No previous thyroid disease.     2. Vitamin D deficiency  Found to have mild vitamin D deficiency.  No currently on any supplementation.      3. Essential hypertension  Reports that she is taking the amlodipine 5 mg daily with good control of her BP.  No HA, CP, SOB    4. Atrial fibrillation with RVR (HCC)  No further episodes, weaned off the metoprolol (which we think may have been causing some bronchospasm) and the amiodarone without further episodes felt per patient.  At the time of onset she was quite ill with the abscess.      Past Medical, Social, and Family history reviewed and updated in EPIC    Allergies:Patient has no known allergies.    Current Outpatient Prescriptions   Medication Sig Dispense Refill   • amLODIPine (NORVASC) 5 MG Tab      • Loratadine (CLARITIN) 10 MG Cap Take  by mouth.     • Probiotic Product (PROBIOTIC PO) Take  by mouth.     • Pseudoephedrine-Guaifenesin (MUCINEX D MAX STRENGTH PO) Take  by mouth.       No current facility-administered medications for this visit.          ROS:  Review of Systems   Constitutional: Negative for fever, chills, weight loss and malaise/fatigue.   HENT: Negative for ear pain, nosebleeds, congestion, sore throat and neck pain.    Eyes: Negative for blurred vision.   Respiratory: Negative for cough, sputum production, shortness of breath and wheezing.    Cardiovascular: Negative for chest pain, palpitations,  and leg swelling.   Gastrointestinal: Negative for heartburn,  nausea, vomiting, diarrhea and abdominal pain.   Genitourinary: Negative for dysuria, urgency and frequency.   Musculoskeletal: Negative for myalgias, back pain and joint pain.   Skin: Negative for rash and itching.   Neurological: Negative for dizziness, tingling, tremors, sensory change, focal weakness and headaches.   Endo/Heme/Allergies: Does not bruise/bleed easily.   Psychiatric/Behavioral: Negative for depression, anxiety, suicidal ideas, insomnia and memory loss.      Exam:  Blood pressure 116/82, pulse 76, temperature 36.5 °C (97.7 °F), resp. rate 16, height 1.524 m (5'), weight 65.3 kg (144 lb), SpO2 99 %.    General:  Well nourished, well developed in NAD  Head is grossly normal.  Neck: Supple without JVD   Pulmonary:  Normal effort.   Cardiovascular: Regular rate  Extremities: no clubbing, cyanosis, or edema.  Psych: affect appropriate      Please note that this dictation was created using voice recognition software. I have made every reasonable attempt to correct obvious errors, but I expect that there are errors of grammar and possibly content that I did not discover before finalizing the note.    Assessment/Plan:  1. Thyroid function study abnormality  Recheck in 3 months to ensure we are dealing with sick euthyroid  - TSH; Future  - FREE THYROXINE; Future  - TRIIDOTHYRONINE; Future    2. Vitamin D deficiency  Start 4000 IU per day of vitamin D and recheck in 3-6 months.   - VITAMIN D,25 HYDROXY; Future    3. Essential hypertension  Continue Amlodipine.     4. Atrial fibrillation with RVR (HCC)  Resolved.  Continue to monitor.      Follow up 3-4 months.

## 2018-08-02 ENCOUNTER — OFFICE VISIT (OUTPATIENT)
Dept: CARDIOLOGY | Facility: MEDICAL CENTER | Age: 58
End: 2018-08-02
Payer: COMMERCIAL

## 2018-08-02 VITALS
OXYGEN SATURATION: 95 % | SYSTOLIC BLOOD PRESSURE: 114 MMHG | HEIGHT: 60 IN | BODY MASS INDEX: 28.86 KG/M2 | HEART RATE: 79 BPM | DIASTOLIC BLOOD PRESSURE: 76 MMHG | WEIGHT: 147 LBS

## 2018-08-02 DIAGNOSIS — I48.0 PAF (PAROXYSMAL ATRIAL FIBRILLATION) (HCC): ICD-10-CM

## 2018-08-02 DIAGNOSIS — I10 ESSENTIAL HYPERTENSION: ICD-10-CM

## 2018-08-02 DIAGNOSIS — I48.91 ATRIAL FIBRILLATION WITH RVR (HCC): ICD-10-CM

## 2018-08-02 PROBLEM — Z79.899 HIGH RISK MEDICATION USE: Status: RESOLVED | Noted: 2018-04-19 | Resolved: 2018-08-02

## 2018-08-02 LAB — EKG IMPRESSION: NORMAL

## 2018-08-02 PROCEDURE — 93000 ELECTROCARDIOGRAM COMPLETE: CPT | Performed by: INTERNAL MEDICINE

## 2018-08-02 PROCEDURE — 99214 OFFICE O/P EST MOD 30 MIN: CPT | Performed by: INTERNAL MEDICINE

## 2018-08-02 NOTE — PROGRESS NOTES
Chief Complaint   Patient presents with   • Atrial Fibrillation     F/V 3MO DX:PAF       Subjective:   Silva Persaud is a 57 y.o. female who presents today with PAF insetting of a neck abscess. Converted spontaneously in hospital. No further episodes. Nl echo/. Nl TFT's. Feels well. Works at [employer].    History reviewed. No pertinent past medical history.  Past Surgical History:   Procedure Laterality Date   • INCISION AND DRAINAGE GENERAL Right 2/10/2018    Procedure: INCISION AND DRAINAGE NECK;  Surgeon: Millie Perez M.D.;  Location: SURGERY [facility];  Service: Ent   • VEIN LIGATION  2006     Family History   Problem Relation Age of Onset   • Diabetes Neg Hx    • Cancer Neg Hx      Social History     Social History   • Marital status: Single     Spouse name: N/A   • Number of children: N/A   • Years of education: N/A     Occupational History   • Not on file.     Social History Main Topics   • Smoking status: Never Smoker   • Smokeless tobacco: Never Used   • Alcohol use No   • Drug use: No   • Sexual activity: No      Comment:       Other Topics Concern   • Not on file     Social History Narrative   • No narrative on file     No Known Allergies  Outpatient Encounter Prescriptions as of 8/2/2018   Medication Sig Dispense Refill   • amLODIPine (NORVASC) 5 MG Tab Take 5 mg by mouth every day.     • Probiotic Product (PROBIOTIC PO) Take  by mouth.     • Loratadine (CLARITIN) 10 MG Cap Take  by mouth.     • Pseudoephedrine-Guaifenesin (MUCINEX D MAX STRENGTH PO) Take  by mouth.       No facility-administered encounter medications on file as of 8/2/2018.      ROS     Objective:   /76   Pulse 79   Ht 1.524 m (5')   Wt 66.7 kg (147 lb)   SpO2 95%   BMI 28.71 kg/m²     Physical Exam   Constitutional: She is oriented to person, place, and time. She appears well-developed and well-nourished.   HENT:   Head: Normocephalic and atraumatic.   Eyes: Pupils are equal, round, and reactive to  light. EOM are normal.   Neck: Normal range of motion. Neck supple.   Cardiovascular: Normal rate, regular rhythm, normal heart sounds and intact distal pulses.  Exam reveals no gallop and no friction rub.    No murmur heard.  Pulmonary/Chest: Effort normal and breath sounds normal.   Abdominal: Soft. Bowel sounds are normal.   Musculoskeletal: Normal range of motion. She exhibits no edema.   Neurological: She is alert and oriented to person, place, and time.   Skin: Skin is warm.   Psychiatric: She has a normal mood and affect. Her behavior is normal. Judgment and thought content normal.       Assessment:     1. PAF (paroxysmal atrial fibrillation) ([facility])  EKG   2. Atrial fibrillation with RVR ([facility])     3. Essential hypertension         Medical Decision Making:  Today's Assessment / Status / Plan:   1. A fib secondary to an intercurrent illness. On no meds. Cancel zio.  2. F/U prn.  3. Htn stable.

## 2018-08-02 NOTE — LETTER
[facility]   [address], [address]  [place] [address]  Phone: [phone]  Fax: [phone]              Silva Persaud  1960    Encounter Date: 8/2/2018    Gavin Grace M.D.          PROGRESS NOTE:  Chief Complaint   Patient presents with   • Atrial Fibrillation     F/V 3MO DX:PAF       Subjective:   Silva Persaud is a 57 y.o. female who presents today with PAF insetting of a neck abscess. Converted spontaneously in hospital. No further episodes. Nl echo/. Nl TFT's. Feels well. Works at [employer].    History reviewed. No pertinent past medical history.  Past Surgical History:   Procedure Laterality Date   • INCISION AND DRAINAGE GENERAL Right 2/10/2018    Procedure: INCISION AND DRAINAGE NECK;  Surgeon: Millie Perez M.D.;  Location: SURGERY [facility];  Service: Ent   • VEIN LIGATION  2006     Family History   Problem Relation Age of Onset   • Diabetes Neg Hx    • Cancer Neg Hx      Social History     Social History   • Marital status: Single     Spouse name: N/A   • Number of children: N/A   • Years of education: N/A     Occupational History   • Not on file.     Social History Main Topics   • Smoking status: Never Smoker   • Smokeless tobacco: Never Used   • Alcohol use No   • Drug use: No   • Sexual activity: No      Comment:       Other Topics Concern   • Not on file     Social History Narrative   • No narrative on file     No Known Allergies  Outpatient Encounter Prescriptions as of 8/2/2018   Medication Sig Dispense Refill   • amLODIPine (NORVASC) 5 MG Tab Take 5 mg by mouth every day.     • Probiotic Product (PROBIOTIC PO) Take  by mouth.     • Loratadine (CLARITIN) 10 MG Cap Take  by mouth.     • Pseudoephedrine-Guaifenesin (MUCINEX D MAX STRENGTH PO) Take  by mouth.       No facility-administered encounter medications on file as of 8/2/2018.      ROS     Objective:   /76   Pulse 79   Ht 1.524 m (5')   Wt  66.7 kg (147 lb)   SpO2 95%   BMI 28.71 kg/m²      Physical Exam   Constitutional: She is oriented to person, place, and time. She appears well-developed and well-nourished.   HENT:   Head: Normocephalic and atraumatic.   Eyes: Pupils are equal, round, and reactive to light. EOM are normal.   Neck: Normal range of motion. Neck supple.   Cardiovascular: Normal rate, regular rhythm, normal heart sounds and intact distal pulses.  Exam reveals no gallop and no friction rub.    No murmur heard.  Pulmonary/Chest: Effort normal and breath sounds normal.   Abdominal: Soft. Bowel sounds are normal.   Musculoskeletal: Normal range of motion. She exhibits no edema.   Neurological: She is alert and oriented to person, place, and time.   Skin: Skin is warm.   Psychiatric: She has a normal mood and affect. Her behavior is normal. Judgment and thought content normal.       Assessment:     1. PAF (paroxysmal atrial fibrillation) ([facility])  EKG   2. Atrial fibrillation with RVR ([facility])     3. Essential hypertension         Medical Decision Making:  Today's Assessment / Status / Plan:   1. A fib secondary to an intercurrent illness. On no meds. Cancel zio.  2. F/U prn.  3. Htn stable.      Makeda Jaquez M.D.  [address] [facility]  [address] [place] [id number]  VIA In Basket

## 2018-11-01 ENCOUNTER — HOSPITAL ENCOUNTER (OUTPATIENT)
Dept: LAB | Facility: MEDICAL CENTER | Age: 58
End: 2018-11-01
Attending: FAMILY MEDICINE
Payer: COMMERCIAL

## 2018-11-01 DIAGNOSIS — R94.6 THYROID FUNCTION STUDY ABNORMALITY: ICD-10-CM

## 2018-11-01 DIAGNOSIS — E55.9 VITAMIN D DEFICIENCY: ICD-10-CM

## 2018-11-01 LAB
25(OH)D3 SERPL-MCNC: 31 NG/ML (ref 30–100)
T3 SERPL-MCNC: 93.9 NG/DL (ref 60–181)
T4 FREE SERPL-MCNC: 1.03 NG/DL (ref 0.53–1.43)
TSH SERPL DL<=0.005 MIU/L-ACNC: 0.89 UIU/ML (ref 0.38–5.33)

## 2018-11-01 PROCEDURE — 36415 COLL VENOUS BLD VENIPUNCTURE: CPT

## 2018-11-01 PROCEDURE — 84480 ASSAY TRIIODOTHYRONINE (T3): CPT

## 2018-11-01 PROCEDURE — 84439 ASSAY OF FREE THYROXINE: CPT

## 2018-11-01 PROCEDURE — 84443 ASSAY THYROID STIM HORMONE: CPT

## 2018-11-01 PROCEDURE — 82306 VITAMIN D 25 HYDROXY: CPT

## 2018-11-09 ENCOUNTER — HOSPITAL ENCOUNTER (OUTPATIENT)
Facility: MEDICAL CENTER | Age: 58
End: 2018-11-09
Attending: FAMILY MEDICINE
Payer: COMMERCIAL

## 2018-11-09 ENCOUNTER — OFFICE VISIT (OUTPATIENT)
Dept: MEDICAL GROUP | Facility: PHYSICIAN GROUP | Age: 58
End: 2018-11-09
Payer: COMMERCIAL

## 2018-11-09 VITALS
HEIGHT: 60 IN | SYSTOLIC BLOOD PRESSURE: 110 MMHG | OXYGEN SATURATION: 99 % | TEMPERATURE: 98.3 F | HEART RATE: 88 BPM | WEIGHT: 141 LBS | DIASTOLIC BLOOD PRESSURE: 70 MMHG | BODY MASS INDEX: 27.68 KG/M2

## 2018-11-09 DIAGNOSIS — Z01.419 WELL WOMAN EXAM WITH ROUTINE GYNECOLOGICAL EXAM: ICD-10-CM

## 2018-11-09 DIAGNOSIS — Z12.4 SCREENING FOR CERVICAL CANCER: ICD-10-CM

## 2018-11-09 PROCEDURE — 99000 SPECIMEN HANDLING OFFICE-LAB: CPT | Performed by: FAMILY MEDICINE

## 2018-11-09 PROCEDURE — 88175 CYTOPATH C/V AUTO FLUID REDO: CPT

## 2018-11-09 PROCEDURE — 87624 HPV HI-RISK TYP POOLED RSLT: CPT

## 2018-11-09 PROCEDURE — 99396 PREV VISIT EST AGE 40-64: CPT | Performed by: FAMILY MEDICINE

## 2018-11-09 NOTE — PROGRESS NOTES
Chief Complaint   Patient presents with   • Gynecologic Exam   • Knee Injury     pain with swelling after falling on her knee       HISTORY OF PRESENT ILLNESS: Patient is a 58 y.o. female new patient who presents today to discuss the following issues:    1. Well woman exam with routine gynecological exam  2. Screening for cervical cancer  This is a pleasant 58 year old female post menopausal.  Here for annual well woman exam.  Denies any pelvic pains, vaginal bleeding or abnormal d/c.  She reports that she did have a fall onto the corner of the stairs 1 month ago and has had some swelling over the patella every time she exercises.        Active Ambulatory Problems     Diagnosis Date Noted   • HTN (hypertension) 07/30/2014   • Abscess or cellulitis, neck 02/07/2018   • CAP (community acquired pneumonia) 02/07/2018   • Atrial fibrillation with RVR (HCC) 02/10/2018   • Cough 05/24/2018     Resolved Ambulatory Problems     Diagnosis Date Noted   • Urinary tract infection symptoms 09/14/2016   • Shoulder blade pain 09/14/2016   • Ear itching 02/08/2017   • Urticaria 04/24/2017   • High risk medication use 04/19/2018     No Additional Past Medical History       Allergies:Patient has no known allergies.    Current Outpatient Prescriptions   Medication Sig Dispense Refill   • Cholecalciferol (VITAMIN D PO) Take  by mouth.     • Multiple Vitamin (MULTI-VITAMIN DAILY PO) Take  by mouth.     • Flaxseed, Linseed, (FLAX SEED OIL PO) Take  by mouth.     • Ascorbic Acid (VITAMIN C PO) Take  by mouth.     • TURMERIC PO Take  by mouth.     • amLODIPine (NORVASC) 5 MG Tab Take 5 mg by mouth every day.     • Loratadine (CLARITIN) 10 MG Cap Take  by mouth.     • Pseudoephedrine-Guaifenesin (MUCINEX D MAX STRENGTH PO) Take  by mouth.     • Probiotic Product (PROBIOTIC PO) Take  by mouth.       No current facility-administered medications for this visit.        Social History   Substance Use Topics   • Smoking status: Never Smoker   •  Smokeless tobacco: Never Used   • Alcohol use No       Family Status   Relation Status   • Mo    • Fa    • Neg Hx (Not Specified)     Family History   Problem Relation Age of Onset   • Diabetes Neg Hx    • Cancer Neg Hx      Health Maintenance Due   Topic Date Due   • PAP SMEAR  2018   • IMM INFLUENZA (1) 2018       ROS:  Review of Systems   Constitutional: Negative for fever, chills, weight loss and malaise/fatigue.   HENT: Negative for ear pain, nosebleeds, congestion, sore throat and neck pain.    Eyes: Negative for blurred vision.   Respiratory: Negative for cough, sputum production, shortness of breath and wheezing.    Cardiovascular: Negative for chest pain, palpitations, orthopnea and leg swelling.   Gastrointestinal: Negative for heartburn, nausea, vomiting and abdominal pain.   Genitourinary: Negative for dysuria, urgency and frequency.   Musculoskeletal: Negative for myalgias, back pain and joint pain.   Skin: Negative for rash and itching.   Neurological: Negative for dizziness, tingling, tremors, sensory change, focal weakness and headaches.   Endo/Heme/Allergies: Does not bruise/bleed easily.   Psychiatric/Behavioral: Negative for depression, suicidal ideas and memory loss.  The patient is not nervous/anxious and does not have insomnia.      Exam:  Blood pressure 110/70, pulse 88, temperature 36.8 °C (98.3 °F), temperature source Temporal, height 1.524 m (5'), weight 64 kg (141 lb), SpO2 99 %, not currently breastfeeding.  General:  Well nourished, well developed female in NAD  HEENT: Normocephalic and atraumatic. TMs clear bilaterally. Oropharynx is clear      without erythema and no tonsillar exudate. Nasal mucosa pink with minimal      Rhinorrhea.  EYES: EOMI.  Conjunctiva clear.    Neck: Supple without JVD or bruit. Thyroid is not enlarged.  Pulmonary: Clear to ausculation and percussion.  Normal effort. No rales, ronchi, or wheezing.  Cardiovascular: Regular rate and  rhythm without murmur, no peripheral edema  Abdomen: positive bowel sounds.  Non tender, non distended, no hepatosplenomegaly.   MSK: normal ROM in upper and lower extremities bilaterally  Psych: appropriate affect and concentration, oriented to person and place  Neuro: no unilateral weakness in upper or lower extremities.  No gait disturbance  External genitalia without lesions  Vaginal mucosa pink and well rugated  Minimal cervical discharge  Pap obtained    Bimanual exam shows normal positioned and sized uterus with no adnexal tenderness or masses    Breasts: normal appearance, no skin changes, no masses, nipple discharge, or LAD    Right knee with pre-patellar bursal swelling, no redness.  Non-tender to palpation.       Please note that this dictation was created using voice recognition software. I have made every reasonable attempt to correct obvious errors, but I expect that there are errors of grammar and possibly content that I did not discover before finalizing the note.    Assessment/Plan:  1. Well woman exam with routine gynecological exam  2. Screening for cervical cancer  Follow PAP results.  Mammo up to date.  Decline flu vaccine.    Labs reviewed and normal.          No Follow-up on file.

## 2018-11-13 LAB
CYTOLOGY REG CYTOL: NORMAL
HPV HR 12 DNA CVX QL NAA+PROBE: NEGATIVE
HPV16 DNA SPEC QL NAA+PROBE: NEGATIVE
HPV18 DNA SPEC QL NAA+PROBE: NEGATIVE
SPECIMEN SOURCE: NORMAL

## 2018-11-14 ENCOUNTER — TELEPHONE (OUTPATIENT)
Dept: MEDICAL GROUP | Facility: PHYSICIAN GROUP | Age: 58
End: 2018-11-14

## 2018-11-15 NOTE — TELEPHONE ENCOUNTER
----- Message from Makeda Jaquez M.D. sent at 11/14/2018  3:18 PM PST -----  Normal PAP, repeat in 3-5 years.

## 2018-11-15 NOTE — TELEPHONE ENCOUNTER
Phone Number Called: [phone] (home)     Message: Left vm for Pt to call office back.     Left Message for patient to call back: yes

## 2018-12-27 ENCOUNTER — HOSPITAL ENCOUNTER (OUTPATIENT)
Dept: RADIOLOGY | Facility: MEDICAL CENTER | Age: 58
End: 2018-12-27
Attending: NURSE PRACTITIONER
Payer: COMMERCIAL

## 2018-12-27 ENCOUNTER — OFFICE VISIT (OUTPATIENT)
Dept: MEDICAL GROUP | Facility: PHYSICIAN GROUP | Age: 58
End: 2018-12-27
Payer: COMMERCIAL

## 2018-12-27 VITALS
SYSTOLIC BLOOD PRESSURE: 118 MMHG | HEART RATE: 97 BPM | HEIGHT: 60 IN | BODY MASS INDEX: 27.68 KG/M2 | DIASTOLIC BLOOD PRESSURE: 78 MMHG | OXYGEN SATURATION: 98 % | WEIGHT: 141 LBS | RESPIRATION RATE: 16 BRPM | TEMPERATURE: 98.4 F

## 2018-12-27 DIAGNOSIS — M25.561 ACUTE PAIN OF RIGHT KNEE: ICD-10-CM

## 2018-12-27 PROBLEM — J18.9 CAP (COMMUNITY ACQUIRED PNEUMONIA): Status: RESOLVED | Noted: 2018-02-07 | Resolved: 2018-12-27

## 2018-12-27 PROCEDURE — 73562 X-RAY EXAM OF KNEE 3: CPT | Mod: RT

## 2018-12-27 PROCEDURE — 99213 OFFICE O/P EST LOW 20 MIN: CPT | Performed by: NURSE PRACTITIONER

## 2018-12-27 NOTE — PROGRESS NOTES
Chief Complaint   Patient presents with   • Knee Injury     Swollen (R) Knee       HISTORY OF PRESENT ILLNESS: Patient is a 58 y.o. female established patient who presents today to discuss the following issues:    Acute pain of right knee  Fell about 2 months ago and hit her right knee against the corner of her stairs.  It has been swollen over the patella ever since.  The pain has improved, but she reports that her knee makes a crunching sound when she squats now.  Discussed plan to proceed with an xray.      Patient Active Problem List    Diagnosis Date Noted   • Atrial fibrillation with RVR (HCC) 02/10/2018     Priority: High   • Acute pain of right knee 2018   • Cough 2018   • HTN (hypertension) 2014       Allergies:Patient has no known allergies.    Current Outpatient Prescriptions   Medication Sig Dispense Refill   • Multiple Vitamin (MULTI-VITAMIN DAILY PO) Take  by mouth.     • Flaxseed, Linseed, (FLAX SEED OIL PO) Take  by mouth.     • Ascorbic Acid (VITAMIN C PO) Take  by mouth.     • TURMERIC PO Take  by mouth.     • amLODIPine (NORVASC) 5 MG Tab Take 5 mg by mouth every day.       No current facility-administered medications for this visit.        Social History   Substance Use Topics   • Smoking status: Never Smoker   • Smokeless tobacco: Never Used   • Alcohol use No       Family Status   Relation Status   • Mo    • Fa    • Neg Hx (Not Specified)     Family History   Problem Relation Age of Onset   • Diabetes Neg Hx    • Cancer Neg Hx        Review of Systems:   Constitutional: Negative for fever, chills, weight loss and malaise/fatigue.   HENT: Negative for ear pain, nosebleeds, congestion, sore throat and neck pain.    Eyes: Negative for blurred vision.   Respiratory: Negative for cough, sputum production, shortness of breath and wheezing.    Cardiovascular: Negative for chest pain, palpitations, orthopnea and leg swelling.   Gastrointestinal: Negative for heartburn,  nausea, vomiting and abdominal pain.   Genitourinary: Negative for dysuria, urgency and frequency.   Musculoskeletal: Negative for myalgias, joint pain, and back pain.  Positive for swelling and occasional discomfort over right patella.  Skin: Negative for rash and itching.   Neurological: Negative for dizziness, tingling, tremors, sensory change, focal weakness and headaches.   Endo/Heme/Allergies: Does not bruise/bleed easily.   Psychiatric/Behavioral: Negative for depression, suicidal ideas and memory loss.  The patient is not nervous/anxious and does not have insomnia.    All other systems reviewed and are negative except as in HPI.    Exam:  Blood pressure 118/78, pulse 97, temperature 36.9 °C (98.4 °F), resp. rate 16, height 1.524 m (5'), weight 64 kg (141 lb), SpO2 98 %, not currently breastfeeding.  General:  Well nourished, well developed female in NAD  Head: Grossly normal.  Neck: Supple without JVD or bruit. Thyroid is not enlarged.  Pulmonary: Clear to ausculation. Normal effort. No rales, ronchi, or wheezing.  Cardiovascular: Regular rate and rhythm without murmur.   Abdomen:  Soft, nontender, nondistended.  Extremities: No clubbing, cyanosis, or edema.  Right knee with prepatellar swelling, nontender.   Skin: Intact with no obvious rashes or lesions.  Neuro: Grossly intact.  Psych: Alert and oriented x 3.  Mood and affect appropriate.    Medical decision-making and discussion: Silva Seaman is here today for follow-up.  An xray was ordered, and she will follow-up here as needed.          Assessment/Plan:  1. Acute pain of right knee  DX-KNEE 3 VIEWS RIGHT       Return if symptoms worsen or fail to improve.    Please note that this dictation was created using voice recognition software. I have made every reasonable attempt to correct obvious errors, but I expect that there are errors of grammar and possibly content that I did not discover before finalizing the note.

## 2018-12-27 NOTE — ASSESSMENT & PLAN NOTE
Fell about 2 months ago and hit her right knee against the corner of her stairs.  It has been swollen over the patella ever since.  The pain has improved, but she reports that her knee makes a crunching sound when she squats now.  Discussed plan to proceed with an xray.

## 2019-03-07 DIAGNOSIS — I10 ESSENTIAL HYPERTENSION: ICD-10-CM

## 2019-03-08 RX ORDER — AMLODIPINE BESYLATE 5 MG/1
TABLET ORAL
Qty: 90 TAB | Refills: 0 | Status: SHIPPED | OUTPATIENT
Start: 2019-03-08 | End: 2019-04-04 | Stop reason: SDUPTHER

## 2019-03-08 NOTE — TELEPHONE ENCOUNTER
Was the patient seen in the last year in this department? Yes    Does patient have an active prescription for medications requested? No     Received Request Via: Patient and pharmacy

## 2019-03-08 NOTE — TELEPHONE ENCOUNTER
Was the patient seen in the last year in this department? Yes    Does patient have an active prescription for medications requested? No     Received Request Via: Pharmacy      Pt met protocol?: Yes    OV  12/18    BP Readings from Last 1 Encounters:   12/27/18 118/78

## 2019-04-04 ENCOUNTER — OFFICE VISIT (OUTPATIENT)
Dept: MEDICAL GROUP | Facility: PHYSICIAN GROUP | Age: 59
End: 2019-04-04
Payer: COMMERCIAL

## 2019-04-04 VITALS
TEMPERATURE: 97.3 F | WEIGHT: 141 LBS | HEIGHT: 60 IN | SYSTOLIC BLOOD PRESSURE: 120 MMHG | DIASTOLIC BLOOD PRESSURE: 82 MMHG | OXYGEN SATURATION: 95 % | HEART RATE: 86 BPM | RESPIRATION RATE: 14 BRPM | BODY MASS INDEX: 27.68 KG/M2

## 2019-04-04 DIAGNOSIS — Z13.29 SCREENING FOR ENDOCRINE DISORDER: ICD-10-CM

## 2019-04-04 DIAGNOSIS — Z13.6 SCREENING FOR CARDIOVASCULAR CONDITION: ICD-10-CM

## 2019-04-04 DIAGNOSIS — Z13.0 SCREENING FOR DEFICIENCY ANEMIA: ICD-10-CM

## 2019-04-04 DIAGNOSIS — Z71.84 TRAVEL ADVICE ENCOUNTER: ICD-10-CM

## 2019-04-04 DIAGNOSIS — I10 ESSENTIAL HYPERTENSION: ICD-10-CM

## 2019-04-04 PROCEDURE — 99214 OFFICE O/P EST MOD 30 MIN: CPT | Performed by: FAMILY MEDICINE

## 2019-04-04 RX ORDER — CIPROFLOXACIN 500 MG/1
500 TABLET, FILM COATED ORAL 2 TIMES DAILY
Qty: 14 TAB | Refills: 0 | Status: SHIPPED | OUTPATIENT
Start: 2019-04-04 | End: 2019-04-11

## 2019-04-04 RX ORDER — AMLODIPINE BESYLATE 5 MG/1
5 TABLET ORAL
Qty: 90 TAB | Refills: 3 | Status: SHIPPED | OUTPATIENT
Start: 2019-04-04 | End: 2020-04-30 | Stop reason: SDUPTHER

## 2019-04-04 ASSESSMENT — PATIENT HEALTH QUESTIONNAIRE - PHQ9: CLINICAL INTERPRETATION OF PHQ2 SCORE: 0

## 2019-04-04 NOTE — PROGRESS NOTES
Chief Complaint   Patient presents with   • Hypertension       HISTORY OF PRESENT ILLNESS: Patient is a 58 y.o. female established patient here today for the following concerns:    1. Essential hypertension  Here for follow up on HTN.  Doing well on amlodipine.  No dizziness or lightheadedness.  No palpitations.      2. Screening for cardiovascular condition  3. Screening for endocrine disorder  4. Screening for deficiency anemia  Due for labs.     5. Travel advice encounter  Also reports traveling to [place] and [place] in the next 2 weeks.  Had OTC antidiarrheals but also wondering about medication should she get sick.         Past Medical, Social, and Family history reviewed and updated in EPIC    Allergies:Patient has no known allergies.    Current Outpatient Prescriptions   Medication Sig Dispense Refill   • amLODIPine (NORVASC) 5 MG Tab Take 1 Tab by mouth every day. 90 Tab 3   • ciprofloxacin (CIPRO) 500 MG Tab Take 1 Tab by mouth 2 times a day for 7 days. 14 Tab 0   • Multiple Vitamin (MULTI-VITAMIN DAILY PO) Take  by mouth.     • Flaxseed, Linseed, (FLAX SEED OIL PO) Take  by mouth.     • Ascorbic Acid (VITAMIN C PO) Take  by mouth.     • TURMERIC PO Take  by mouth.     • amLODIPine (NORVASC) 5 MG Tab Take 5 mg by mouth every day.       No current facility-administered medications for this visit.          ROS:  Review of Systems   Constitutional: Negative for fever, chills, weight loss and malaise/fatigue.   HENT: Negative for ear pain, nosebleeds, congestion, sore throat and neck pain.    Eyes: Negative for blurred vision.   Respiratory: Negative for cough, sputum production, shortness of breath and wheezing.    Cardiovascular: Negative for chest pain, palpitations,  and leg swelling.   Gastrointestinal: Negative for heartburn, nausea, vomiting, diarrhea and abdominal pain.   Genitourinary: Negative for dysuria, urgency and frequency.   Musculoskeletal: Negative for myalgias, back pain and joint pain.    Skin: Negative for rash and itching.   Neurological: Negative for dizziness, tingling, tremors, sensory change, focal weakness and headaches.   Endo/Heme/Allergies: Does not bruise/bleed easily.   Psychiatric/Behavioral: Negative for depression, anxiety, suicidal ideas, insomnia and memory loss.      Exam:  /82   Pulse 86   Temp 36.3 °C (97.3 °F)   Resp 14   Ht 1.524 m (5')   Wt 64 kg (141 lb)   SpO2 95%     General:  Well nourished, well developed in NAD  Head is grossly normal.  Neck: Supple without JVD   Pulmonary:  Normal effort.   Cardiovascular: Regular rate  Extremities: no clubbing, cyanosis, or edema.  Psych: affect appropriate      Please note that this dictation was created using voice recognition software. I have made every reasonable attempt to correct obvious errors, but I expect that there are errors of grammar and possibly content that I did not discover before finalizing the note.    Assessment/Plan:  1. Essential hypertension  continue  - amLODIPine (NORVASC) 5 MG Tab; Take 1 Tab by mouth every day.  Dispense: 90 Tab; Refill: 3    2. Screening for cardiovascular condition  - Lipid Profile; Future    3. Screening for endocrine disorder  - Comp Metabolic Panel; Future  - HEMOGLOBIN A1C; Future  - TSH WITH REFLEX TO FT4; Future    4. Screening for deficiency anemia  - CBC WITH DIFFERENTIAL; Future    5. Travel advice encounter  - ciprofloxacin (CIPRO) 500 MG Tab; Take 1 Tab by mouth 2 times a day for 7 days.  Dispense: 14 Tab; Refill: 0    Follow up 6-12 months

## 2019-06-25 ENCOUNTER — HOSPITAL ENCOUNTER (OUTPATIENT)
Dept: LAB | Facility: MEDICAL CENTER | Age: 59
End: 2019-06-25
Attending: FAMILY MEDICINE
Payer: COMMERCIAL

## 2019-06-25 DIAGNOSIS — Z13.6 SCREENING FOR CARDIOVASCULAR CONDITION: ICD-10-CM

## 2019-06-25 DIAGNOSIS — Z13.29 SCREENING FOR ENDOCRINE DISORDER: ICD-10-CM

## 2019-06-25 DIAGNOSIS — Z13.0 SCREENING FOR DEFICIENCY ANEMIA: ICD-10-CM

## 2019-06-25 LAB
ALBUMIN SERPL BCP-MCNC: 4.4 G/DL (ref 3.2–4.9)
ALBUMIN/GLOB SERPL: 1.6 G/DL
ALP SERPL-CCNC: 72 U/L (ref 30–99)
ALT SERPL-CCNC: 14 U/L (ref 2–50)
ANION GAP SERPL CALC-SCNC: 4 MMOL/L (ref 0–11.9)
AST SERPL-CCNC: 18 U/L (ref 12–45)
BASOPHILS # BLD AUTO: 0.6 % (ref 0–1.8)
BASOPHILS # BLD: 0.03 K/UL (ref 0–0.12)
BILIRUB SERPL-MCNC: 0.7 MG/DL (ref 0.1–1.5)
BUN SERPL-MCNC: 11 MG/DL (ref 8–22)
CALCIUM SERPL-MCNC: 9.3 MG/DL (ref 8.5–10.5)
CHLORIDE SERPL-SCNC: 105 MMOL/L (ref 96–112)
CHOLEST SERPL-MCNC: 159 MG/DL (ref 100–199)
CO2 SERPL-SCNC: 30 MMOL/L (ref 20–33)
CREAT SERPL-MCNC: 0.64 MG/DL (ref 0.5–1.4)
EOSINOPHIL # BLD AUTO: 0.18 K/UL (ref 0–0.51)
EOSINOPHIL NFR BLD: 3.4 % (ref 0–6.9)
ERYTHROCYTE [DISTWIDTH] IN BLOOD BY AUTOMATED COUNT: 45.2 FL (ref 35.9–50)
EST. AVERAGE GLUCOSE BLD GHB EST-MCNC: 103 MG/DL
FASTING STATUS PATIENT QL REPORTED: NORMAL
GLOBULIN SER CALC-MCNC: 2.8 G/DL (ref 1.9–3.5)
GLUCOSE SERPL-MCNC: 92 MG/DL (ref 65–99)
HBA1C MFR BLD: 5.2 % (ref 0–5.6)
HCT VFR BLD AUTO: 38.4 % (ref 37–47)
HDLC SERPL-MCNC: 48 MG/DL
HGB BLD-MCNC: 14 G/DL (ref 12–16)
IMM GRANULOCYTES # BLD AUTO: 0 K/UL (ref 0–0.11)
IMM GRANULOCYTES NFR BLD AUTO: 0 % (ref 0–0.9)
LDLC SERPL CALC-MCNC: 80 MG/DL
LYMPHOCYTES # BLD AUTO: 2.16 K/UL (ref 1–4.8)
LYMPHOCYTES NFR BLD: 40.7 % (ref 22–41)
MCH RBC QN AUTO: 33.3 PG (ref 27–33)
MCHC RBC AUTO-ENTMCNC: 36.5 G/DL (ref 33.6–35)
MCV RBC AUTO: 91.4 FL (ref 81.4–97.8)
MONOCYTES # BLD AUTO: 0.36 K/UL (ref 0–0.85)
MONOCYTES NFR BLD AUTO: 6.8 % (ref 0–13.4)
NEUTROPHILS # BLD AUTO: 2.58 K/UL (ref 2–7.15)
NEUTROPHILS NFR BLD: 48.5 % (ref 44–72)
NRBC # BLD AUTO: 0 K/UL
NRBC BLD-RTO: 0 /100 WBC
PLATELET # BLD AUTO: 217 K/UL (ref 164–446)
PMV BLD AUTO: 9.8 FL (ref 9–12.9)
POTASSIUM SERPL-SCNC: 3.7 MMOL/L (ref 3.6–5.5)
PROT SERPL-MCNC: 7.2 G/DL (ref 6–8.2)
RBC # BLD AUTO: 4.2 M/UL (ref 4.2–5.4)
SODIUM SERPL-SCNC: 139 MMOL/L (ref 135–145)
TRIGL SERPL-MCNC: 155 MG/DL (ref 0–149)
TSH SERPL DL<=0.005 MIU/L-ACNC: 0.68 UIU/ML (ref 0.38–5.33)
WBC # BLD AUTO: 5.3 K/UL (ref 4.8–10.8)

## 2019-06-25 PROCEDURE — 80061 LIPID PANEL: CPT

## 2019-06-25 PROCEDURE — 83036 HEMOGLOBIN GLYCOSYLATED A1C: CPT

## 2019-06-25 PROCEDURE — 36415 COLL VENOUS BLD VENIPUNCTURE: CPT

## 2019-06-25 PROCEDURE — 84443 ASSAY THYROID STIM HORMONE: CPT

## 2019-06-25 PROCEDURE — 80053 COMPREHEN METABOLIC PANEL: CPT

## 2019-06-25 PROCEDURE — 85025 COMPLETE CBC W/AUTO DIFF WBC: CPT

## 2019-07-26 ENCOUNTER — OFFICE VISIT (OUTPATIENT)
Dept: MEDICAL GROUP | Facility: PHYSICIAN GROUP | Age: 59
End: 2019-07-26
Payer: COMMERCIAL

## 2019-07-26 VITALS
BODY MASS INDEX: 27.48 KG/M2 | RESPIRATION RATE: 18 BRPM | DIASTOLIC BLOOD PRESSURE: 84 MMHG | OXYGEN SATURATION: 96 % | SYSTOLIC BLOOD PRESSURE: 116 MMHG | WEIGHT: 140 LBS | HEART RATE: 94 BPM | TEMPERATURE: 99 F | HEIGHT: 60 IN

## 2019-07-26 DIAGNOSIS — L24.5 IRRITANT CONTACT DERMATITIS DUE TO OTHER CHEMICAL PRODUCTS: ICD-10-CM

## 2019-07-26 PROCEDURE — 99214 OFFICE O/P EST MOD 30 MIN: CPT | Performed by: FAMILY MEDICINE

## 2019-07-26 RX ORDER — TRIAMCINOLONE ACETONIDE 1 MG/G
1 CREAM TOPICAL 2 TIMES DAILY
Qty: 1 TUBE | Refills: 0 | Status: SHIPPED | OUTPATIENT
Start: 2019-07-26 | End: 2019-08-16 | Stop reason: SDUPTHER

## 2019-07-26 NOTE — PROGRESS NOTES
Chief Complaint   Patient presents with   • Allergic Reaction     head, arms   • Itchy     skin,       HISTORY OF PRESENT ILLNESS: Patient is a 58 y.o. female established patient here today for the following concerns:    1. 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  She reports that she did not wear the sleeve recommended as it causes her to be itchy.  She reports that by the time she left found several slivers and white fragments in her hair.  She has had red pruritic rash.  Used benadryl cream for it, but is keeping her up at night.        Past Medical, Social, and Family history reviewed and updated in EPIC    Allergies:Patient has no known allergies.    Current Outpatient Prescriptions   Medication Sig Dispense Refill   • triamcinolone acetonide (KENALOG) 0.1 % Cream Apply 1 Application to affected area(s) 2 times a day. 1 Tube 0   • amLODIPine (NORVASC) 5 MG Tab Take 1 Tab by mouth every day. 90 Tab 3   • Multiple Vitamin (MULTI-VITAMIN DAILY PO) Take  by mouth.     • Flaxseed, Linseed, (FLAX SEED OIL PO) Take  by mouth.     • Ascorbic Acid (VITAMIN C PO) Take  by mouth.     • TURMERIC PO Take  by mouth.     • amLODIPine (NORVASC) 5 MG Tab Take 5 mg by mouth every day.       No current facility-administered medications for this visit.          ROS:  Review of Systems   Constitutional: Negative for fever, chills, weight loss and malaise/fatigue.   HENT: Negative for ear pain, nosebleeds, congestion, sore throat and neck pain.    Eyes: Negative for blurred vision.   Respiratory: Negative for cough, sputum production, shortness of breath and wheezing.    Cardiovascular: Negative for chest pain, palpitations,  and leg swelling.   Gastrointestinal: Negative for heartburn, nausea, vomiting, diarrhea and abdominal pain.   Genitourinary: Negative for dysuria, urgency and frequency.   Musculoskeletal:  Negative for myalgias, back pain and joint pain.   Skin: + for rash and itching.   Neurological: Negative for dizziness, tingling, tremors, sensory change, focal weakness and headaches.   Endo/Heme/Allergies: Does not bruise/bleed easily.   Psychiatric/Behavioral: Negative for depression, anxiety, suicidal ideas, insomnia and memory loss.      Exam:  /84 (BP Location: Right arm, Patient Position: Sitting, BP Cuff Size: Adult)   Pulse 94   Temp 37.2 °C (99 °F) (Temporal)   Resp 18   Ht 1.524 m (5')   Wt 63.5 kg (140 lb)   SpO2 96%     General:  Well nourished, well developed in NAD  Head is grossly normal.  Neck: Supple without JVD   Pulmonary:  Normal effort.   Cardiovascular: Regular rate  Extremities: no clubbing, cyanosis, or edema.  Psych: affect appropriate  Skin: erythematous raised rash over arms, trunk, back scalp    Please note that this dictation was created using voice recognition software. I have made every reasonable attempt to correct obvious errors, but I expect that there are errors of grammar and possibly content that I did not discover before finalizing the note.    Assessment/Plan:  1. Irritant contact dermatitis due to other chemical products  For the Fiberglass contact irritation:    1. Take warm water bath soak with epsom salt (can be purchased over the counter) gently scrubbing the skin    2. Wash all clothing that came into contact with fiberglass in washer.  Then make sure you do an extra rinse on the empty washer.      3. Use topical steroid (triamcinolone prescription) in any spots that are irritated or itchy twice daily   - triamcinolone acetonide (KENALOG) 0.1 % Cream; Apply 1 Application to affected area(s) 2 times a day.  Dispense: 1 Tube; Refill: 0    Benadryl at night 50 mg for itching

## 2019-07-26 NOTE — PATIENT INSTRUCTIONS
For the Fiberglass contact irritation:    1. Take warm water bath soak with epsom salt (can be purchased over the counter) gently scrubbing the skin    2. Wash all clothing that came into contact with fiberglass in washer.  Then make sure you do an extra rinse on the empty washer.      3. Use topical steroid (triamcinolone prescription) in any spots that are irritated or itchy twice daily

## 2019-08-01 ENCOUNTER — TELEPHONE (OUTPATIENT)
Dept: MEDICAL GROUP | Facility: PHYSICIAN GROUP | Age: 59
End: 2019-08-01

## 2019-08-01 NOTE — LETTER
[address] [address]     August 1, 2019    Patient: Silva Persaud   YOB: 1960   Date of Visit: 7/26/2019       To Whom It May Concern:    Silva Persaud was seen and treated in our department on 7/26/2019.     Sincerely,   Makeda Jaquez M.D.

## 2019-08-16 ENCOUNTER — OFFICE VISIT (OUTPATIENT)
Dept: MEDICAL GROUP | Facility: PHYSICIAN GROUP | Age: 59
End: 2019-08-16
Payer: COMMERCIAL

## 2019-08-16 VITALS
SYSTOLIC BLOOD PRESSURE: 126 MMHG | OXYGEN SATURATION: 96 % | HEART RATE: 80 BPM | WEIGHT: 137 LBS | TEMPERATURE: 97.6 F | BODY MASS INDEX: 26.9 KG/M2 | DIASTOLIC BLOOD PRESSURE: 82 MMHG | RESPIRATION RATE: 12 BRPM | HEIGHT: 60 IN

## 2019-08-16 DIAGNOSIS — L24.5 IRRITANT CONTACT DERMATITIS DUE TO OTHER CHEMICAL PRODUCTS: ICD-10-CM

## 2019-08-16 PROCEDURE — 99213 OFFICE O/P EST LOW 20 MIN: CPT | Performed by: FAMILY MEDICINE

## 2019-08-16 RX ORDER — TRIAMCINOLONE ACETONIDE 1 MG/G
1 CREAM TOPICAL 2 TIMES DAILY
Qty: 1 TUBE | Refills: 3 | Status: SHIPPED | OUTPATIENT
Start: 2019-08-16 | End: 2020-04-30 | Stop reason: SDUPTHER

## 2019-08-16 NOTE — PROGRESS NOTES
Chief Complaint   Patient presents with   • Itching     all over body       HISTORY OF PRESENT ILLNESS: Patient is a 58 y.o. female established patient here today for the following concerns:    1. Irritant contact dermatitis due to other chemical products  Here today with some persistent itching since exposure to fiberglass while cross training at work.  She reports that she did wash the clothes but is concerned about some cross contamination with her other clothing.  She needs a refill on the triamcinolone cream.  She continues on benadryl at night and claritin in the day.  She needs a note for work stating she was treated for skin irritation related to the fiberglass.       Past Medical, Social, and Family history reviewed and updated in EPIC    Allergies:Patient has no known allergies.    Current Outpatient Medications   Medication Sig Dispense Refill   • triamcinolone acetonide (KENALOG) 0.1 % Cream Apply 1 Application to affected area(s) 2 times a day. 1 Tube 3   • amLODIPine (NORVASC) 5 MG Tab Take 1 Tab by mouth every day. 90 Tab 3   • Multiple Vitamin (MULTI-VITAMIN DAILY PO) Take  by mouth.     • Flaxseed, Linseed, (FLAX SEED OIL PO) Take  by mouth.     • Ascorbic Acid (VITAMIN C PO) Take  by mouth.     • TURMERIC PO Take  by mouth.     • amLODIPine (NORVASC) 5 MG Tab Take 5 mg by mouth every day.       No current facility-administered medications for this visit.          ROS:  Review of Systems   Constitutional: Negative for fever, chills, weight loss and malaise/fatigue.   HENT: Negative for ear pain, nosebleeds, congestion, sore throat and neck pain.    Eyes: Negative for blurred vision.   Respiratory: Negative for cough, sputum production, shortness of breath and wheezing.    Cardiovascular: Negative for chest pain, palpitations,  and leg swelling.   Gastrointestinal: Negative for heartburn, nausea, vomiting, diarrhea and abdominal pain.   Genitourinary: Negative for dysuria, urgency and frequency.    Musculoskeletal: Negative for myalgias, back pain and joint pain.   Skin: Negative for rash and itching.   Neurological: Negative for dizziness, tingling, tremors, sensory change, focal weakness and headaches.   Endo/Heme/Allergies: Does not bruise/bleed easily.   Psychiatric/Behavioral: Negative for depression, anxiety, suicidal ideas, insomnia and memory loss.      Exam:  /82 (BP Location: Right arm, Patient Position: Sitting, BP Cuff Size: Adult)   Pulse 80   Temp 36.4 °C (97.6 °F)   Resp 12   Ht 1.524 m (5')   Wt 62.1 kg (137 lb)   SpO2 96%     General:  Well nourished, well developed in NAD  Head is grossly normal.  Neck: Supple without JVD   Pulmonary:  Normal effort.   Cardiovascular: Regular rate  Extremities: no clubbing, cyanosis, or edema.  Psych: affect appropriate  Skin: a few patches of scaling/dryness over the extremities, hips and lower legs.     Please note that this dictation was created using voice recognition software. I have made every reasonable attempt to correct obvious errors, but I expect that there are errors of grammar and possibly content that I did not discover before finalizing the note.    Assessment/Plan:  1. Irritant contact dermatitis due to other chemical products    - triamcinolone acetonide (KENALOG) 0.1 % Cream; Apply 1 Application to affected area(s) 2 times a day.  Dispense: 1 Tube; Refill: 3

## 2019-08-16 NOTE — LETTER
August 16, 2019        Silva Persaud           To Whom it may concern;         Ms. Persaud was treated on 7/26/19 for skin irritation related to fiberglass exposure and again for persistent itching on 8/16/19                      Makeda Jaquez M.D.

## 2019-09-14 NOTE — LETTER
February 6, 2018         Patient: Urszula Persaud   YOB: 1960   Date of Visit: 2/6/2018           To Whom it May Concern:    Urszula Persaud was seen in my clinic on 2/6/2018. She may return to work on Thurs. Feb. 8th.    If you have any questions or concerns, please don't hesitate to call.        Sincerely,           Ousmane Novak P.A.-C.  Electronically Signed      EXAGGERATED/HEIGHTENED/OVERLY DRAMATIC

## 2019-10-17 ENCOUNTER — OFFICE VISIT (OUTPATIENT)
Dept: MEDICAL GROUP | Facility: PHYSICIAN GROUP | Age: 59
End: 2019-10-17
Payer: COMMERCIAL

## 2019-10-17 VITALS
BODY MASS INDEX: 27.45 KG/M2 | HEART RATE: 79 BPM | RESPIRATION RATE: 18 BRPM | SYSTOLIC BLOOD PRESSURE: 100 MMHG | HEIGHT: 60 IN | TEMPERATURE: 98.6 F | DIASTOLIC BLOOD PRESSURE: 78 MMHG | WEIGHT: 139.8 LBS | OXYGEN SATURATION: 97 %

## 2019-10-17 DIAGNOSIS — M19.072 PRIMARY OSTEOARTHRITIS OF BOTH FEET: ICD-10-CM

## 2019-10-17 DIAGNOSIS — M19.071 PRIMARY OSTEOARTHRITIS OF BOTH FEET: ICD-10-CM

## 2019-10-17 DIAGNOSIS — Z12.39 SCREENING FOR BREAST CANCER: ICD-10-CM

## 2019-10-17 DIAGNOSIS — L71.9 ROSACEA, UNSPECIFIED: ICD-10-CM

## 2019-10-17 DIAGNOSIS — Z12.11 SCREENING FOR COLON CANCER: ICD-10-CM

## 2019-10-17 PROCEDURE — 99214 OFFICE O/P EST MOD 30 MIN: CPT | Performed by: FAMILY MEDICINE

## 2019-10-17 NOTE — PROGRESS NOTES
Chief Complaint   Patient presents with   • Other     Bumps on face, getting more   • Orders Needed     Mammogram   • Medication Refill     Volataren       HISTORY OF PRESENT ILLNESS: Patient is a 59 y.o. female established patient here today for the following concerns:    1. Screening for breast cancer  Due for mammogram.  No breast changes or lumps noted.     2. Screening for colon cancer  Due for colon cancer screening.  Declines colonoscopy    3. Rosacea, unspecified  reprots that she has noted increase in number of small raised lesions predominantly around the nose on the cheeks.  A friend's  has hx of skin cancer and this made her more concerned for her new lesions.  She has hx of rhinophyma.  She would like to consult with dermatology about treatment options.     4. Primary osteoarthritis of both feet  Also requests refill on voltaren gel.  She finds this helps the arthritis pain in her feet after long day of standing.  No redness, swelling or warmth noted.       Past Medical, Social, and Family history reviewed and updated in EPIC    Allergies:Patient has no known allergies.    Current Outpatient Medications   Medication Sig Dispense Refill   • Calcium-Magnesium-Vitamin D 185- MG-MG-UNIT Cap Take  by mouth.     • VOLTAREN 1 % Gel 2 g by Apply externally route 3 times a day as needed. 1 Tube 5   • amLODIPine (NORVASC) 5 MG Tab Take 1 Tab by mouth every day. 90 Tab 3   • Multiple Vitamin (MULTI-VITAMIN DAILY PO) Take  by mouth.     • Flaxseed, Linseed, (FLAX SEED OIL PO) Take  by mouth.     • triamcinolone acetonide (KENALOG) 0.1 % Cream Apply 1 Application to affected area(s) 2 times a day. (Patient not taking: Reported on 10/17/2019) 1 Tube 3   • Ascorbic Acid (VITAMIN C PO) Take  by mouth.     • TURMERIC PO Take  by mouth.     • amLODIPine (NORVASC) 5 MG Tab Take 5 mg by mouth every day.       No current facility-administered medications for this visit.          ROS:  Review of Systems    Constitutional: Negative for fever, chills, weight loss and malaise/fatigue.   HENT: Negative for ear pain, nosebleeds, congestion, sore throat and neck pain.    Eyes: Negative for blurred vision.   Respiratory: Negative for cough, sputum production, shortness of breath and wheezing.    Cardiovascular: Negative for chest pain, palpitations,  and leg swelling.   Gastrointestinal: Negative for heartburn, nausea, vomiting, diarrhea and abdominal pain.   Genitourinary: Negative for dysuria, urgency and frequency.   Musculoskeletal: Negative for myalgias, back pain and joint pain.   Skin: Negative for rash and itching.   Neurological: Negative for dizziness, tingling, tremors, sensory change, focal weakness and headaches.   Endo/Heme/Allergies: Does not bruise/bleed easily.   Psychiatric/Behavioral: Negative for depression, anxiety, suicidal ideas, insomnia and memory loss.      Exam:  /78 (BP Location: Right arm, Patient Position: Sitting, BP Cuff Size: Adult)   Pulse 79   Temp 37 °C (98.6 °F) (Temporal)   Resp 18   Ht 1.524 m (5')   Wt 63.4 kg (139 lb 12.8 oz)   SpO2 97%     General:  Well nourished, well developed in NAD  Head is grossly normal.  Skin: rhinophyma observed.  Multiple small raised flesh colored lesions over the cheeks.    Neck: Supple without JVD   Pulmonary:  Normal effort.   Cardiovascular: Regular rate  Extremities: no clubbing, cyanosis, or edema.  Psych: affect appropriate      Please note that this dictation was created using voice recognition software. I have made every reasonable attempt to correct obvious errors, but I expect that there are errors of grammar and possibly content that I did not discover before finalizing the note.    Assessment/Plan:  1. Screening for breast cancer  - MA-SCREENING MAMMO BILAT W/TOMOSYNTHESIS W/CAD; Future    2. Screening for colon cancer  - COLOGUARD (FIT DNA)    3. Rosacea, unspecified  - REFERRAL TO DERMATOLOGY    4. Primary osteoarthritis of  both feet  - VOLTAREN 1 % Gel; 2 g by Apply externally route 3 times a day as needed.  Dispense: 1 Tube; Refill: 5

## 2019-11-15 ENCOUNTER — HOSPITAL ENCOUNTER (OUTPATIENT)
Dept: RADIOLOGY | Facility: MEDICAL CENTER | Age: 59
End: 2019-11-15
Attending: FAMILY MEDICINE
Payer: COMMERCIAL

## 2019-11-15 DIAGNOSIS — Z12.39 SCREENING FOR BREAST CANCER: ICD-10-CM

## 2019-11-15 PROCEDURE — 77063 BREAST TOMOSYNTHESIS BI: CPT

## 2020-01-24 ENCOUNTER — NON-PROVIDER VISIT (OUTPATIENT)
Dept: MEDICAL GROUP | Facility: PHYSICIAN GROUP | Age: 60
End: 2020-01-24
Payer: COMMERCIAL

## 2020-01-24 DIAGNOSIS — Z23 NEED FOR VACCINATION: ICD-10-CM

## 2020-01-24 PROCEDURE — 90686 IIV4 VACC NO PRSV 0.5 ML IM: CPT | Performed by: FAMILY MEDICINE

## 2020-01-24 PROCEDURE — 90471 IMMUNIZATION ADMIN: CPT | Performed by: FAMILY MEDICINE

## 2020-01-24 NOTE — PROGRESS NOTES
"Silva Persaud is a 59 y.o. female here for a non-provider visit for:   FLU    Reason for immunization: Annual Flu Vaccine  Immunization records indicate need for vaccine: Yes, confirmed with Epic and confirmed with [place] [employer]  Minimum interval has been met for this vaccine: Yes  ABN completed: Not Indicated    Order and dose verified by: CHARLES  VIS Dated  08/15/2019 was given to patient: Yes  All IAC Questionnaire questions were answered \"No.\"    Patient tolerated injection and no adverse effects were observed or reported: Yes    Pt scheduled for next dose in series: Not Indicated    "

## 2020-03-17 ENCOUNTER — OFFICE VISIT (OUTPATIENT)
Dept: URGENT CARE | Facility: CLINIC | Age: 60
End: 2020-03-17
Payer: COMMERCIAL

## 2020-03-17 VITALS
WEIGHT: 143.4 LBS | SYSTOLIC BLOOD PRESSURE: 128 MMHG | RESPIRATION RATE: 14 BRPM | HEIGHT: 60 IN | HEART RATE: 108 BPM | DIASTOLIC BLOOD PRESSURE: 80 MMHG | BODY MASS INDEX: 28.16 KG/M2 | OXYGEN SATURATION: 95 % | TEMPERATURE: 97.9 F

## 2020-03-17 DIAGNOSIS — J06.9 VIRAL URI WITH COUGH: ICD-10-CM

## 2020-03-17 PROCEDURE — 99214 OFFICE O/P EST MOD 30 MIN: CPT | Performed by: PHYSICIAN ASSISTANT

## 2020-03-17 RX ORDER — CODEINE PHOSPHATE/GUAIFENESIN 10-100MG/5
5 LIQUID (ML) ORAL 3 TIMES DAILY PRN
Qty: 120 ML | Refills: 0 | Status: SHIPPED | OUTPATIENT
Start: 2020-03-17 | End: 2020-03-24

## 2020-03-17 RX ORDER — BENZONATATE 100 MG/1
100-200 CAPSULE ORAL 3 TIMES DAILY PRN
Qty: 60 CAP | Refills: 0 | Status: SHIPPED | OUTPATIENT
Start: 2020-03-17 | End: 2020-04-30

## 2020-03-17 ASSESSMENT — ENCOUNTER SYMPTOMS
NAUSEA: 0
CHILLS: 0
EYE REDNESS: 0
RHINORRHEA: 1
COUGH: 1
WHEEZING: 0
SORE THROAT: 1
SHORTNESS OF BREATH: 0
EYE DISCHARGE: 0
DIARRHEA: 0
HEMOPTYSIS: 0
FEVER: 0
DIZZINESS: 0
MUSCULOSKELETAL NEGATIVE: 1
SPUTUM PRODUCTION: 0
VOMITING: 0
ABDOMINAL PAIN: 0

## 2020-03-17 ASSESSMENT — FIBROSIS 4 INDEX: FIB4 SCORE: 1.29

## 2020-03-17 ASSESSMENT — COPD QUESTIONNAIRES: COPD: 0

## 2020-03-17 NOTE — LETTER
March 17, 2020         Patient: Silva Persaud   YOB: 1960   Date of Visit: 3/17/2020           To Whom it May Concern:    Silva Persaud was seen in my clinic on 3/17/2020. Please excuse her absence from 3/17/20-3/18/20.     If you have any questions or concerns, please don't hesitate to call.        Sincerely,           Bettina Garcia P.A.-C.  Electronically Signed

## 2020-03-17 NOTE — PROGRESS NOTES
Subjective:      Silva Persaud is a 59 y.o. female who presents with Cough (x 1 week.  Pt. has dy cough.  Pt. thinks she may have had a low grade fever the first night but did not take her temp.  She denies any other symptoms.)            Cough   This is a new problem. The current episode started in the past 7 days (1 week). The problem has been unchanged. The problem occurs every few minutes. Associated symptoms include nasal congestion, postnasal drip, rhinorrhea and a sore throat. Pertinent negatives include no chest pain, chills, ear pain, eye redness, fever, hemoptysis, rash, shortness of breath or wheezing. Nothing aggravates the symptoms. She has tried OTC cough suppressant for the symptoms. The treatment provided mild relief. There is no history of asthma, COPD or pneumonia.     Patient presents to urgent care reporting a 1 week history of dry cough and sore throat. No fevers, body aches, chest pain, wheezing, or SOB. No history of chronic lung disease or pneumonia. No known sick contacts or recent use of antibiotics.     Review of Systems   Constitutional: Negative for chills and fever.   HENT: Positive for congestion, postnasal drip, rhinorrhea and sore throat. Negative for ear pain.    Eyes: Negative for discharge and redness.   Respiratory: Positive for cough. Negative for hemoptysis, sputum production, shortness of breath and wheezing.    Cardiovascular: Negative for chest pain.   Gastrointestinal: Negative for abdominal pain, diarrhea, nausea and vomiting.   Genitourinary: Negative.    Musculoskeletal: Negative.    Skin: Negative for rash.   Neurological: Negative for dizziness.        Objective:     /80   Pulse (!) 108   Temp 36.6 °C (97.9 °F) (Temporal)   Resp 14   Ht 1.524 m (5')   Wt 65 kg (143 lb 6.4 oz)   SpO2 95%   BMI 28.01 kg/m²      Physical Exam  Vitals signs and nursing note reviewed.   Constitutional:       General: She is not in acute distress.     Appearance:  Normal appearance. She is well-developed. She is not diaphoretic.   HENT:      Head: Normocephalic.      Right Ear: Hearing, tympanic membrane, ear canal and external ear normal.      Left Ear: Hearing, tympanic membrane, ear canal and external ear normal.      Nose: Congestion and rhinorrhea present.      Mouth/Throat:      Pharynx: No oropharyngeal exudate or posterior oropharyngeal erythema.   Eyes:      General:         Right eye: No discharge.         Left eye: No discharge.      Pupils: Pupils are equal, round, and reactive to light.   Neck:      Musculoskeletal: Normal range of motion.   Cardiovascular:      Rate and Rhythm: Normal rate and regular rhythm.      Heart sounds: Normal heart sounds. No murmur.   Pulmonary:      Effort: Pulmonary effort is normal.      Breath sounds: Normal breath sounds. No wheezing or rales.      Comments: Dry cough present throughout exam  Musculoskeletal: Normal range of motion.   Lymphadenopathy:      Cervical: No cervical adenopathy.   Skin:     General: Skin is warm and dry.   Neurological:      Mental Status: She is alert.            PMH:  has no past medical history on file.  MEDS:   Current Outpatient Medications:   •  benzonatate (TESSALON) 100 MG Cap, Take 1-2 Caps by mouth 3 times a day as needed., Disp: 60 Cap, Rfl: 0  •  guaifenesin-codeine (TUSSI-ORGANIDIN NR) 100-10 MG/5ML syrup, Take 5 mL by mouth 3 times a day as needed for up to 7 days., Disp: 120 mL, Rfl: 0  •  Calcium-Magnesium-Vitamin D 185- MG-MG-UNIT Cap, Take  by mouth., Disp: , Rfl:   •  VOLTAREN 1 % Gel, 2 g by Apply externally route 3 times a day as needed., Disp: 1 Tube, Rfl: 5  •  amLODIPine (NORVASC) 5 MG Tab, Take 1 Tab by mouth every day., Disp: 90 Tab, Rfl: 3  •  Multiple Vitamin (MULTI-VITAMIN DAILY PO), Take  by mouth., Disp: , Rfl:   •  Flaxseed, Linseed, (FLAX SEED OIL PO), Take  by mouth., Disp: , Rfl:   •  Ascorbic Acid (VITAMIN C PO), Take  by mouth., Disp: , Rfl:   •  triamcinolone  acetonide (KENALOG) 0.1 % Cream, Apply 1 Application to affected area(s) 2 times a day., Disp: 1 Tube, Rfl: 3  •  TURMERIC PO, Take  by mouth., Disp: , Rfl:   •  amLODIPine (NORVASC) 5 MG Tab, Take 5 mg by mouth every day., Disp: , Rfl:   ALLERGIES: No Known Allergies  SURGHX:   Past Surgical History:   Procedure Laterality Date   • INCISION AND DRAINAGE GENERAL Right 2/10/2018    Procedure: INCISION AND DRAINAGE NECK;  Surgeon: Millie Perez M.D.;  Location: SURGERY [facility];  Service: Ent   • VEIN LIGATION  2006     SOCHX:  reports that she has never smoked. She has never used smokeless tobacco. She reports that she does not drink alcohol or use drugs.  FH: family history is not on file.       Assessment/Plan:       1. Viral URI with cough    - benzonatate (TESSALON) 100 MG Cap; Take 1-2 Caps by mouth 3 times a day as needed.  Dispense: 60 Cap; Refill: 0  - guaifenesin-codeine (TUSSI-ORGANIDIN NR) 100-10 MG/5ML syrup; Take 5 mL by mouth 3 times a day as needed for up to 7 days.  Dispense: 120 mL; Refill: 0   - Will cause sedation, avoid driving, operating heavy machinery, and drinking alcohol    Advised patient symptoms are most likely viral in etiology, recommend supportive care. Increased fluids and rest. Tessalon perles and magic mouthwash as needed for symptomatic relief. Call or return to office if symptoms persist or worsen. The patient demonstrated a good understanding and agreed with the treatment plan.

## 2020-04-30 ENCOUNTER — OFFICE VISIT (OUTPATIENT)
Dept: MEDICAL GROUP | Facility: PHYSICIAN GROUP | Age: 60
End: 2020-04-30
Payer: COMMERCIAL

## 2020-04-30 VITALS
OXYGEN SATURATION: 96 % | TEMPERATURE: 98.9 F | BODY MASS INDEX: 27.19 KG/M2 | SYSTOLIC BLOOD PRESSURE: 130 MMHG | DIASTOLIC BLOOD PRESSURE: 82 MMHG | HEART RATE: 96 BPM | HEIGHT: 61 IN | WEIGHT: 144 LBS | RESPIRATION RATE: 14 BRPM

## 2020-04-30 DIAGNOSIS — I10 ESSENTIAL HYPERTENSION: ICD-10-CM

## 2020-04-30 DIAGNOSIS — I48.91 ATRIAL FIBRILLATION WITH RVR (HCC): ICD-10-CM

## 2020-04-30 DIAGNOSIS — L24.5 IRRITANT CONTACT DERMATITIS DUE TO OTHER CHEMICAL PRODUCTS: ICD-10-CM

## 2020-04-30 PROBLEM — R05.9 COUGH: Status: RESOLVED | Noted: 2018-05-24 | Resolved: 2020-04-30

## 2020-04-30 PROCEDURE — 99214 OFFICE O/P EST MOD 30 MIN: CPT | Performed by: FAMILY MEDICINE

## 2020-04-30 RX ORDER — AMLODIPINE BESYLATE 5 MG/1
5 TABLET ORAL
Qty: 90 TAB | Refills: 3 | Status: SHIPPED | OUTPATIENT
Start: 2020-04-30 | End: 2021-03-05 | Stop reason: SDUPTHER

## 2020-04-30 RX ORDER — TRIAMCINOLONE ACETONIDE 1 MG/G
1 CREAM TOPICAL 2 TIMES DAILY
Qty: 1 TUBE | Refills: 3 | Status: SHIPPED | OUTPATIENT
Start: 2020-04-30

## 2020-04-30 ASSESSMENT — FIBROSIS 4 INDEX: FIB4 SCORE: 1.29

## 2020-04-30 ASSESSMENT — PATIENT HEALTH QUESTIONNAIRE - PHQ9: CLINICAL INTERPRETATION OF PHQ2 SCORE: 0

## 2020-04-30 NOTE — PROGRESS NOTES
Chief Complaint   Patient presents with   • Hypertension     fv med       HISTORY OF PRESENT ILLNESS: Patient is a 59 y.o. female established patient here today for the following concerns:    1. Irritant contact dermatitis due to other chemical products  Reports that she ate eggs and started to get itching and rash over the upper extremities which is new for her.  She does requets refill on kenalog for previous dermatitis.      2. Essential hypertension  Continues on amlodipine 5 mg daily.  Due for renewal.  No side effects noted.  No CP, SOB, leg swelling.     3. Atrial fibrillation with RVR (HCC)  Hx of Afib with RVR, no evidence of recurrence.  Patient denies any CP, SOB, or palpitations.        Past Medical, Social, and Family history reviewed and updated in EPIC    Allergies:Eggs    Current Outpatient Medications   Medication Sig Dispense Refill   • triamcinolone acetonide (KENALOG) 0.1 % Cream Apply 1 Application to affected area(s) 2 times a day. 1 Tube 3   • amLODIPine (NORVASC) 5 MG Tab Take 1 Tab by mouth every day. 90 Tab 3   • Calcium-Magnesium-Vitamin D 185- MG-MG-UNIT Cap Take  by mouth.     • Multiple Vitamin (MULTI-VITAMIN DAILY PO) Take  by mouth.     • Flaxseed, Linseed, (FLAX SEED OIL PO) Take  by mouth.     • Ascorbic Acid (VITAMIN C PO) Take  by mouth.     • TURMERIC PO Take  by mouth.       No current facility-administered medications for this visit.          ROS:  Review of Systems   Constitutional: Negative for fever, chills, weight loss and malaise/fatigue.   HENT: Negative for ear pain, nosebleeds, congestion, sore throat and neck pain.    Eyes: Negative for blurred vision.   Respiratory: Negative for cough, sputum production, shortness of breath and wheezing.    Cardiovascular: Negative for chest pain, palpitations,  and leg swelling.   Gastrointestinal: Negative for heartburn, nausea, vomiting, diarrhea and abdominal pain.   Genitourinary: Negative for dysuria, urgency and  "frequency.   Musculoskeletal: Negative for myalgias, back pain and joint pain.   Skin: Negative for rash and itching.   Neurological: Negative for dizziness, tingling, tremors, sensory change, focal weakness and headaches.   Endo/Heme/Allergies: Does not bruise/bleed easily.   Psychiatric/Behavioral: Negative for depression, anxiety, suicidal ideas, insomnia and memory loss.      Exam:  /82   Pulse 96   Temp 37.2 °C (98.9 °F)   Resp 14   Ht 1.537 m (5' 0.5\")   Wt 65.3 kg (144 lb)   SpO2 96%     General:  Well nourished, well developed in NAD  Head is grossly normal.  Neck: Supple without JVD   Pulmonary:  Normal effort. CTAB  Cardiovascular: Regular rate and rhythm no m/r/g  Extremities: no clubbing, cyanosis, or edema.  Psych: affect appropriate  Skin: erythematous urticarial type rash over both arms.     Please note that this dictation was created using voice recognition software. I have made every reasonable attempt to correct obvious errors, but I expect that there are errors of grammar and possibly content that I did not discover before finalizing the note.    Assessment/Plan:  1. Irritant contact dermatitis due to other chemical products   triamcinolone acetonide (KENALOG) 0.1 % Cream; Apply 1 Application to affected area(s) 2 times a day.  Dispense: 1 Tube; Refill: 3  Added egg to the allergy list    2. Essential hypertension  - amLODIPine (NORVASC) 5 MG Tab; Take 1 Tab by mouth every day.  Dispense: 90 Tab; Refill: 3  - Comp Metabolic Panel; Future  - Lipid Profile; Future    3. Atrial fibrillation with RVR (HCC)  No evidence of recurrence.  May consider in the future Event monitor/21 day monitor.              "

## 2020-06-26 ENCOUNTER — HOSPITAL ENCOUNTER (OUTPATIENT)
Dept: LAB | Facility: MEDICAL CENTER | Age: 60
End: 2020-06-26
Attending: FAMILY MEDICINE
Payer: COMMERCIAL

## 2020-06-26 DIAGNOSIS — I10 ESSENTIAL HYPERTENSION: ICD-10-CM

## 2020-06-26 LAB
ALBUMIN SERPL BCP-MCNC: 4.4 G/DL (ref 3.2–4.9)
ALBUMIN/GLOB SERPL: 1.6 G/DL
ALP SERPL-CCNC: 77 U/L (ref 30–99)
ALT SERPL-CCNC: 24 U/L (ref 2–50)
ANION GAP SERPL CALC-SCNC: 10 MMOL/L (ref 7–16)
AST SERPL-CCNC: 18 U/L (ref 12–45)
BILIRUB SERPL-MCNC: 0.7 MG/DL (ref 0.1–1.5)
BUN SERPL-MCNC: 10 MG/DL (ref 8–22)
CALCIUM SERPL-MCNC: 9.1 MG/DL (ref 8.5–10.5)
CHLORIDE SERPL-SCNC: 103 MMOL/L (ref 96–112)
CHOLEST SERPL-MCNC: 169 MG/DL (ref 100–199)
CO2 SERPL-SCNC: 26 MMOL/L (ref 20–33)
CREAT SERPL-MCNC: 0.56 MG/DL (ref 0.5–1.4)
FASTING STATUS PATIENT QL REPORTED: NORMAL
GLOBULIN SER CALC-MCNC: 2.7 G/DL (ref 1.9–3.5)
GLUCOSE SERPL-MCNC: 91 MG/DL (ref 65–99)
HDLC SERPL-MCNC: 56 MG/DL
LDLC SERPL CALC-MCNC: 86 MG/DL
POTASSIUM SERPL-SCNC: 3.7 MMOL/L (ref 3.6–5.5)
PROT SERPL-MCNC: 7.1 G/DL (ref 6–8.2)
SODIUM SERPL-SCNC: 139 MMOL/L (ref 135–145)
TRIGL SERPL-MCNC: 134 MG/DL (ref 0–149)

## 2020-06-26 PROCEDURE — 80061 LIPID PANEL: CPT

## 2020-06-26 PROCEDURE — 36415 COLL VENOUS BLD VENIPUNCTURE: CPT

## 2020-06-26 PROCEDURE — 80053 COMPREHEN METABOLIC PANEL: CPT

## 2020-07-01 ENCOUNTER — TELEPHONE (OUTPATIENT)
Dept: MEDICAL GROUP | Facility: PHYSICIAN GROUP | Age: 60
End: 2020-07-01

## 2020-07-02 NOTE — TELEPHONE ENCOUNTER
1. Caller Name: Silva Seaman      Call Back Number: [phone] (home)     Pt was informed of lab results

## 2020-10-29 ENCOUNTER — OFFICE VISIT (OUTPATIENT)
Dept: MEDICAL GROUP | Facility: PHYSICIAN GROUP | Age: 60
End: 2020-10-29
Payer: COMMERCIAL

## 2020-10-29 VITALS
HEIGHT: 61 IN | HEART RATE: 100 BPM | BODY MASS INDEX: 28.7 KG/M2 | SYSTOLIC BLOOD PRESSURE: 120 MMHG | TEMPERATURE: 97.8 F | DIASTOLIC BLOOD PRESSURE: 82 MMHG | WEIGHT: 152 LBS | RESPIRATION RATE: 14 BRPM | OXYGEN SATURATION: 97 %

## 2020-10-29 DIAGNOSIS — M17.12 PRIMARY OSTEOARTHRITIS OF LEFT KNEE: ICD-10-CM

## 2020-10-29 PROCEDURE — 99214 OFFICE O/P EST MOD 30 MIN: CPT | Performed by: FAMILY MEDICINE

## 2020-10-29 RX ORDER — MELOXICAM 7.5 MG/1
7.5 TABLET ORAL DAILY
Qty: 30 TAB | Refills: 0 | Status: SHIPPED | OUTPATIENT
Start: 2020-10-29 | End: 2021-03-05

## 2020-10-29 ASSESSMENT — FIBROSIS 4 INDEX: FIB4 SCORE: 1

## 2020-10-29 NOTE — PROGRESS NOTES
Chief Complaint   Patient presents with   • Knee Pain     bilateral knee pain       HISTORY OF PRESENT ILLNESS: Patient is a 60 y.o. female established patient here today for the following concerns:    1. Primary osteoarthritis of left knee  Here today with flare of left knee pain without any known injury x 3 weeks.  Notes some slight swelling, sometimes into the back of the knee.  Using Tylenol with a little improvement temporarily.  4-6/10 aching pain without radiation.  No locking or giving way.  Worse with prolonged sitting then standing.        Past Medical, Social, and Family history reviewed and updated in EPIC    Allergies:Eggs    Current Outpatient Medications   Medication Sig Dispense Refill   • VITAMIN D PO Take  by mouth.     • meloxicam (MOBIC) 7.5 MG Tab Take 1 Tab by mouth every day. 30 Tab 0   • triamcinolone acetonide (KENALOG) 0.1 % Cream Apply 1 Application to affected area(s) 2 times a day. 1 Tube 3   • amLODIPine (NORVASC) 5 MG Tab Take 1 Tab by mouth every day. 90 Tab 3   • Calcium-Magnesium-Vitamin D 185- MG-MG-UNIT Cap Take  by mouth.     • Multiple Vitamin (MULTI-VITAMIN DAILY PO) Take  by mouth.     • Flaxseed, Linseed, (FLAX SEED OIL PO) Take  by mouth.     • Ascorbic Acid (VITAMIN C PO) Take  by mouth.     • TURMERIC PO Take  by mouth.       No current facility-administered medications for this visit.          ROS:  Review of Systems   Constitutional: Negative for fever, chills, weight loss and malaise/fatigue.   HENT: Negative for ear pain, nosebleeds, congestion, sore throat and neck pain.    Eyes: Negative for blurred vision.   Respiratory: Negative for cough, sputum production, shortness of breath and wheezing.    Cardiovascular: Negative for chest pain, palpitations,  and leg swelling.   Gastrointestinal: Negative for heartburn, nausea, vomiting, diarrhea and abdominal pain.   Genitourinary: Negative for dysuria, urgency and frequency.   Musculoskeletal: Negative for myalgias,  "back pain and +joint pain.   Skin: Negative for rash and itching.   Neurological: Negative for dizziness, tingling, tremors, sensory change, focal weakness and headaches.   Endo/Heme/Allergies: Does not bruise/bleed easily.   Psychiatric/Behavioral: Negative for depression, anxiety, suicidal ideas, insomnia and memory loss.      Exam:  /82   Pulse 100   Temp 36.6 °C (97.8 °F)   Resp 14   Ht 1.537 m (5' 0.5\")   Wt 68.9 kg (152 lb)   SpO2 97%     General:  Well nourished, well developed in NAD  Head is grossly normal.  Neck: Supple without JVD   Pulmonary:  Normal effort.   Cardiovascular: Regular rate  Extremities: no clubbing, cyanosis, or edema.  Psych: affect appropriate  Msk: small effusion, some chondromalacia, + crepitus on extension.  LImited flexion due to pain.  Negative ant and post drawer/negative Lachman. Collaterals intact.      Please note that this dictation was created using voice recognition software. I have made every reasonable attempt to correct obvious errors, but I expect that there are errors of grammar and possibly content that I did not discover before finalizing the note.    Assessment/Plan:  1. Primary osteoarthritis of left knee  Suspect patellofemoral arthritis, OA   Trial of Meloxicam 7.5 mg daily for next 2-4 weeks.  If not improving will get Xray and consider corticosteroid injection.             "

## 2021-03-05 ENCOUNTER — OFFICE VISIT (OUTPATIENT)
Dept: MEDICAL GROUP | Facility: PHYSICIAN GROUP | Age: 61
End: 2021-03-05
Payer: COMMERCIAL

## 2021-03-05 VITALS
HEART RATE: 94 BPM | SYSTOLIC BLOOD PRESSURE: 124 MMHG | DIASTOLIC BLOOD PRESSURE: 78 MMHG | BODY MASS INDEX: 28.47 KG/M2 | TEMPERATURE: 99.7 F | HEIGHT: 60 IN | WEIGHT: 145 LBS | OXYGEN SATURATION: 97 %

## 2021-03-05 DIAGNOSIS — Z13.0 SCREENING FOR ENDOCRINE, METABOLIC AND IMMUNITY DISORDER: ICD-10-CM

## 2021-03-05 DIAGNOSIS — I48.91 ATRIAL FIBRILLATION WITH RVR (HCC): ICD-10-CM

## 2021-03-05 DIAGNOSIS — G89.29 CHRONIC PAIN OF RIGHT KNEE: ICD-10-CM

## 2021-03-05 DIAGNOSIS — I10 ESSENTIAL HYPERTENSION: ICD-10-CM

## 2021-03-05 DIAGNOSIS — R01.1 HEART MURMUR: ICD-10-CM

## 2021-03-05 DIAGNOSIS — Z13.29 SCREENING FOR ENDOCRINE, METABOLIC AND IMMUNITY DISORDER: ICD-10-CM

## 2021-03-05 DIAGNOSIS — M25.561 CHRONIC PAIN OF RIGHT KNEE: ICD-10-CM

## 2021-03-05 DIAGNOSIS — Z12.31 ENCOUNTER FOR SCREENING MAMMOGRAM FOR BREAST CANCER: ICD-10-CM

## 2021-03-05 DIAGNOSIS — Z13.228 SCREENING FOR ENDOCRINE, METABOLIC AND IMMUNITY DISORDER: ICD-10-CM

## 2021-03-05 PROCEDURE — 99214 OFFICE O/P EST MOD 30 MIN: CPT | Performed by: PHYSICIAN ASSISTANT

## 2021-03-05 RX ORDER — AMLODIPINE BESYLATE 5 MG/1
5 TABLET ORAL
Qty: 90 TABLET | Refills: 3 | Status: SHIPPED | OUTPATIENT
Start: 2021-03-05

## 2021-03-05 ASSESSMENT — PATIENT HEALTH QUESTIONNAIRE - PHQ9: CLINICAL INTERPRETATION OF PHQ2 SCORE: 0

## 2021-03-05 ASSESSMENT — FIBROSIS 4 INDEX: FIB4 SCORE: 1

## 2021-03-05 NOTE — ASSESSMENT & PLAN NOTE
Patient had an episode of A. fib in the setting of having a neck abscess and being hospitalized in 2018.  She did not need cardioversion and not on any medication.  She was seen by cardiology and told to follow-up if she needed it.  She reports no new episodes of palpitations or chest pain.

## 2021-03-05 NOTE — PROGRESS NOTES
Subjective:     CC: Establish care, hypertension    HPI:   Silva Seaman presents today with     Atrial fibrillation with RVR (CMS-HCC) (HCC)  Patient had an episode of A. fib in the setting of having a neck abscess and being hospitalized in 2018.  She did not need cardioversion and not on any medication.  She was seen by cardiology and told to follow-up if she needed it.  She reports no new episodes of palpitations or chest pain.     HTN (hypertension)  Currently on amlodipine 5 mg. Takes blood pressure at home but she is not sure what it says.     Chronic pain of right knee  Chronic pain, not really bothersome. Uses voltaren gel which helps.       History reviewed. No pertinent past medical history.    Social History     Tobacco Use   • Smoking status: Never Smoker   • Smokeless tobacco: Never Used   Substance Use Topics   • Alcohol use: No     Alcohol/week: 0.0 oz     Comment: once a year   • Drug use: No       Current Outpatient Medications Ordered in Epic   Medication Sig Dispense Refill   • amLODIPine (NORVASC) 5 MG Tab Take 1 tablet by mouth every day. 90 tablet 3   • VITAMIN D PO Take  by mouth.     • triamcinolone acetonide (KENALOG) 0.1 % Cream Apply 1 Application to affected area(s) 2 times a day. 1 Tube 3   • Calcium-Magnesium-Vitamin D 185- MG-MG-UNIT Cap Take  by mouth.     • Multiple Vitamin (MULTI-VITAMIN DAILY PO) Take  by mouth.     • Flaxseed, Linseed, (FLAX SEED OIL PO) Take  by mouth.     • Ascorbic Acid (VITAMIN C PO) Take  by mouth.       No current Epic-ordered facility-administered medications on file.       Allergies:  Eggs    Health Maintenance: Completed.  Mammogram ordered today.  Recommended she get her mammogram done prior to getting her first COVID-19 vaccine.     ROS:  Gen: no fevers/chills  Eyes: no changes in vision  ENT: no sore throat  Pulm: no sob, no cough  CV: no chest pain, no palpitations  GI: no nausea/vomiting  : no dysuria  MSk: no myalgias  Skin: no  rash  Neuro: no headaches  Psych: no depression, no anxiety      Objective:   Exam:  /78   Pulse 94   Temp 37.6 °C (99.7 °F) (Temporal)   Ht 1.524 m (5')   Wt 65.8 kg (145 lb)   SpO2 97%   BMI 28.32 kg/m²  Body mass index is 28.32 kg/m².    Gen: Alert and oriented, No apparent distress.  Skin: Warm, dry, good turgor, no rashes in visible areas.  HEENT: Normocephalic. Eyes conjunctiva clear lids without ptosis, pupils equal and reactive to light accommodation, ears normal shape and contour, canals are clear bilaterally, tympanic membranes are benign, nasal mucosa benign, oropharynx is without erythema, edema or exudates.   Neck: Trachea midline, no masses, no thyromegaly  Lungs: Normal effort, CTA bilaterally, no wheezes, rhonchi, or rales  CV: Regular rate and rhythm. Heart murmur heard at right upper sternal border, No rubs or gallops.  MSK: Normal gait, moves all extremities.  Neuro: Grossly non-focal.  Ext: No clubbing, cyanosis, edema.  Psych: Alert and oriented x3, normal affect and mood.     Labs: Labs from 6/26/2020 were reviewed and discussed with the patient. All questions were answered.     Assessment & Plan:     60 y.o. female with the following -     1. Atrial fibrillation with RVR (HCC)  Chronic.  Patient had an episode of atrial fibrillation while hospitalized in 2018 and has not had another episode since then.  She was evaluated by cardiology but told she no longer needs to follow-up    2. Essential hypertension  Chronic, well controlled.  Blood pressure in the office today is 124/70.  Plan is to continue with amlodipine 5 mg  - amLODIPine (NORVASC) 5 MG Tab; Take 1 tablet by mouth every day.  Dispense: 90 tablet; Refill: 3    3. Chronic pain of right knee  Chronic, well controlled.  Patient reports she occasionally uses Voltaren gel which is very helpful.     4. Heart murmur  This is a new diagnosis.  Ordered an echocardiogram to evaluate.   - EC-ECHOCARDIOGRAM COMPLETE W/O CONT;  Future    5. Encounter for screening mammogram for breast cancer  Mammogram ordered today.  - MA-SCREENING MAMMO BILAT W/CAD; Future    6. Screening for endocrine, metabolic and immunity disorder  Annual labs ordered  - CBC WITH DIFFERENTIAL; Future  - Comp Metabolic Panel; Future  - VITAMIN D,25 HYDROXY; Future  - Lipid Profile; Future  - TSH WITH REFLEX TO FT4; Future  - HEMOGLOBIN A1C; Future    I spent a total of 35 minutes with record review (including external notes and labs), exam, communication with the patient, communication with other providers, and documentation of this encounter.     Return in about 3 months (around 6/5/2021) for annual wellness or sooner if needed.    Please note that this dictation was created using voice recognition software. I have made every reasonable attempt to correct obvious errors, but I expect that there are errors of grammar and possibly content that I did not discover before finalizing the note.    Electronically signed by Radha Foster PA-C on March 5, 2021

## 2021-04-26 ENCOUNTER — HOSPITAL ENCOUNTER (OUTPATIENT)
Dept: CARDIOLOGY | Facility: MEDICAL CENTER | Age: 61
End: 2021-04-26
Attending: PHYSICIAN ASSISTANT
Payer: COMMERCIAL

## 2021-04-26 ENCOUNTER — TELEPHONE (OUTPATIENT)
Dept: MEDICAL GROUP | Facility: PHYSICIAN GROUP | Age: 61
End: 2021-04-26

## 2021-04-26 ENCOUNTER — HOSPITAL ENCOUNTER (OUTPATIENT)
Dept: RADIOLOGY | Facility: MEDICAL CENTER | Age: 61
End: 2021-04-26
Attending: PHYSICIAN ASSISTANT
Payer: COMMERCIAL

## 2021-04-26 DIAGNOSIS — Z12.31 ENCOUNTER FOR SCREENING MAMMOGRAM FOR BREAST CANCER: ICD-10-CM

## 2021-04-26 DIAGNOSIS — R01.1 HEART MURMUR: ICD-10-CM

## 2021-04-26 LAB
LV EJECT FRACT MOD 2C 99903: 69.05
LV EJECT FRACT MOD 4C 99902: 76.18
LV EJECT FRACT MOD BP 99901: 71.25

## 2021-04-26 PROCEDURE — 93306 TTE W/DOPPLER COMPLETE: CPT | Mod: 26 | Performed by: INTERNAL MEDICINE

## 2021-04-26 PROCEDURE — 93306 TTE W/DOPPLER COMPLETE: CPT

## 2021-04-26 PROCEDURE — 77063 BREAST TOMOSYNTHESIS BI: CPT

## 2021-04-27 NOTE — TELEPHONE ENCOUNTER
Called and spoke with pt and relayed results for EKG and Mammogram. Pt voiced understanding and thanked me for the call!

## 2021-06-29 ENCOUNTER — TELEPHONE (OUTPATIENT)
Dept: MEDICAL GROUP | Facility: PHYSICIAN GROUP | Age: 61
End: 2021-06-29

## 2021-06-29 LAB
25(OH)D3+25(OH)D2 SERPL-MCNC: 22.4 NG/ML (ref 30–100)
ALBUMIN SERPL-MCNC: 5 G/DL (ref 3.8–4.9)
ALBUMIN/GLOB SERPL: 2 {RATIO} (ref 1.2–2.2)
ALP SERPL-CCNC: 97 IU/L (ref 48–121)
ALT SERPL-CCNC: 16 IU/L (ref 0–32)
AST SERPL-CCNC: 17 IU/L (ref 0–40)
BASOPHILS # BLD AUTO: 0 X10E3/UL (ref 0–0.2)
BASOPHILS NFR BLD AUTO: 0 %
BILIRUB SERPL-MCNC: 0.8 MG/DL (ref 0–1.2)
BUN SERPL-MCNC: 10 MG/DL (ref 6–24)
BUN/CREAT SERPL: 15 (ref 9–23)
CALCIUM SERPL-MCNC: 9.4 MG/DL (ref 8.7–10.2)
CHLORIDE SERPL-SCNC: 104 MMOL/L (ref 96–106)
CO2 SERPL-SCNC: 22 MMOL/L (ref 20–29)
CREAT SERPL-MCNC: 0.67 MG/DL (ref 0.57–1)
EOSINOPHIL # BLD AUTO: 0.2 X10E3/UL (ref 0–0.4)
EOSINOPHIL NFR BLD AUTO: 4 %
ERYTHROCYTE [DISTWIDTH] IN BLOOD BY AUTOMATED COUNT: 13.4 % (ref 11.7–15.4)
GLOBULIN SER CALC-MCNC: 2.5 G/DL (ref 1.5–4.5)
GLUCOSE SERPL-MCNC: 106 MG/DL (ref 65–99)
HCT VFR BLD AUTO: 40.9 % (ref 34–46.6)
HGB BLD-MCNC: 14.9 G/DL (ref 11.1–15.9)
IMM GRANULOCYTES # BLD AUTO: 0 X10E3/UL (ref 0–0.1)
IMM GRANULOCYTES NFR BLD AUTO: 0 %
IMMATURE CELLS  115398: ABNORMAL
LYMPHOCYTES # BLD AUTO: 2.5 X10E3/UL (ref 0.7–3.1)
LYMPHOCYTES NFR BLD AUTO: 46 %
MCH RBC QN AUTO: 34 PG (ref 26.6–33)
MCHC RBC AUTO-ENTMCNC: 36.4 G/DL (ref 31.5–35.7)
MCV RBC AUTO: 93 FL (ref 79–97)
MONOCYTES # BLD AUTO: 0.4 X10E3/UL (ref 0.1–0.9)
MONOCYTES NFR BLD AUTO: 8 %
MORPHOLOGY BLD-IMP: ABNORMAL
NEUTROPHILS # BLD AUTO: 2.3 X10E3/UL (ref 1.4–7)
NEUTROPHILS NFR BLD AUTO: 42 %
NRBC BLD AUTO-RTO: ABNORMAL %
PLATELET # BLD AUTO: 210 X10E3/UL (ref 150–450)
POTASSIUM SERPL-SCNC: 4.2 MMOL/L (ref 3.5–5.2)
PROT SERPL-MCNC: 7.5 G/DL (ref 6–8.5)
RBC # BLD AUTO: 4.38 X10E6/UL (ref 3.77–5.28)
SODIUM SERPL-SCNC: 140 MMOL/L (ref 134–144)
WBC # BLD AUTO: 5.4 X10E3/UL (ref 3.4–10.8)

## 2021-06-29 NOTE — TELEPHONE ENCOUNTER
"----- Message from Radha Foster P.A.-C. sent at 6/29/2021  8:20 AM PDT -----  Please call the patient and let her know that I have reviewed her blood work and it was all acceptable with the exception of her vitamin D level is low.  Vitamin D3 is an important vitamin for bone strength and to help prevent osteoporosis/bone fractures.  Please start supplementing with 5000 international units of vitamin D3 daily which you can buy over-the-counter at any drugstore.  We will plan to recheck your vitamin D level with your next labs. It also showed that the blood sugar was a little bit elevated.  This is a sign of prediabetes or being \"at risk\" for diabetes. This is not something that requires medication at this point, but please eat a diet that is low in sugars, carbohydrates, alcohol, etc. as these can all raise your blood sugar.  Please let me know she has any questions or concerns.    Thank you,  Radha Foster PA-C  "

## 2022-05-02 NOTE — TELEPHONE ENCOUNTER
"1. Caller Name: Silva Seaman                                          Call Back Number: [phone] (home)        Patient approves a detailed voicemail message: N\A    Pt called and stated she needed a note for her work in regards to her appt on 07/26/19. I attempted to clarify w/ pt whether she needed a note stating she has some sort of physical limitation or does she just need a doctors note validating she was being seen that day, pts response was, \"yes\". Attempted to clarify again but pt didn't seem to understand. Pt was informed we would print a doctors note for her and leave upfront in binder.     " Spoke with pt and remind pt appointment on 5/23/22. Pt verbalized understanding

## 2023-06-16 ENCOUNTER — HOSPITAL ENCOUNTER (OUTPATIENT)
Dept: RADIOLOGY | Facility: MEDICAL CENTER | Age: 63
End: 2023-06-16
Attending: INTERNAL MEDICINE
Payer: COMMERCIAL

## 2023-06-16 DIAGNOSIS — Z12.31 VISIT FOR SCREENING MAMMOGRAM: ICD-10-CM

## 2023-06-16 PROCEDURE — 77063 BREAST TOMOSYNTHESIS BI: CPT

## 2024-06-25 ENCOUNTER — HOSPITAL ENCOUNTER (OUTPATIENT)
Dept: RADIOLOGY | Facility: MEDICAL CENTER | Age: 64
End: 2024-06-25
Attending: NURSE PRACTITIONER
Payer: MEDICAID

## 2024-06-25 DIAGNOSIS — Z12.31 ENCOUNTER FOR MAMMOGRAM TO ESTABLISH BASELINE MAMMOGRAM: ICD-10-CM

## 2024-06-25 PROCEDURE — 77063 BREAST TOMOSYNTHESIS BI: CPT

## 2025-06-27 ENCOUNTER — HOSPITAL ENCOUNTER (OUTPATIENT)
Dept: RADIOLOGY | Facility: MEDICAL CENTER | Age: 65
End: 2025-06-27
Attending: FAMILY MEDICINE
Payer: MEDICAID

## 2025-06-27 DIAGNOSIS — Z12.31 VISIT FOR SCREENING MAMMOGRAM: ICD-10-CM

## 2025-06-27 PROCEDURE — 77063 BREAST TOMOSYNTHESIS BI: CPT

## (undated) DEVICE — KIT ROOM DECONTAMINATION

## (undated) DEVICE — PROTECTOR ULNA NERVE - (36PR/CA)

## (undated) DEVICE — SUTURE 2-0 SILK 12 X 18" (36PK/BX)"

## (undated) DEVICE — Device

## (undated) DEVICE — SWAB ANAEROBIC SPEC.COLLECTOR - (25/PK 4PK/CA 100EA/CA)

## (undated) DEVICE — SUTURE GENERAL

## (undated) DEVICE — GOWN WARMING STANDARD FLEX - (30/CA)

## (undated) DEVICE — HEAD HOLDER JUNIOR/ADULT

## (undated) DEVICE — SET LEADWIRE 5 LEAD BEDSIDE DISPOSABLE ECG (1SET OF 5/EA)

## (undated) DEVICE — MASK ANESTHESIA ADULT  - (100/CA)

## (undated) DEVICE — SODIUM CHL IRRIGATION 0.9% 1000ML (12EA/CA)

## (undated) DEVICE — SUTURE 3-0 ETHILON FS-1 - (36/BX) 30 INCH

## (undated) DEVICE — TUBE, CULTURE AEROBIC

## (undated) DEVICE — SENSOR SPO2 NEO LNCS ADHESIVE (20/BX) SEE USER NOTES

## (undated) DEVICE — SUCTION INSTRUMENT YANKAUER BULBOUS TIP W/O VENT (50EA/CA)

## (undated) DEVICE — SPONGE GAUZESTER 4 X 4 4PLY - (128PK/CA)

## (undated) DEVICE — ELECTRODE DUAL RETURN W/ CORD - (50/PK)

## (undated) DEVICE — KIT ANESTHESIA W/CIRCUIT & 3/LT BAG W/FILTER (20EA/CA)

## (undated) DEVICE — DRAIN PENROSE 3/8 IN X 12 IN - STERILE (25EA/BX)

## (undated) DEVICE — SLEEVE, VASO, THIGH, MED

## (undated) DEVICE — DRAPE SURGICAL U 77X120 - (10/CA)

## (undated) DEVICE — ELECTRODE 850 FOAM ADHESIVE - HYDROGEL RADIOTRNSPRNT (50/PK)

## (undated) DEVICE — TRAY SKIN SCRUB PVP WET (20EA/CA) PART #DYND70356 DISCONTINUED

## (undated) DEVICE — SET EXTENSION WITH 2 PORTS (48EA/CA) ***PART #2C8610 IS A SUBSTITUTE*****

## (undated) DEVICE — CANISTER SUCTION 3000ML MECHANICAL FILTER AUTO SHUTOFF MEDI-VAC NONSTERILE LF DISP  (40EA/CA)

## (undated) DEVICE — BLADE SURGICAL #15 - (50/BX 3BX/CA)

## (undated) DEVICE — LACTATED RINGERS INJ 1000 ML - (14EA/CA 60CA/PF)

## (undated) DEVICE — PACK MINOR BASIN - (2EA/CA)

## (undated) DEVICE — GLOVE BIOGEL SZ 7 SURGICAL PF LTX - (50PR/BX 4BX/CA)

## (undated) DEVICE — SYRINGE 10 ML CONTROL LL (25EA/BX 4BX/CA)

## (undated) DEVICE — STAPLER SKIN DISP - (6/BX 10BX/CA) VISISTAT

## (undated) DEVICE — TUBING CLEARLINK DUO-VENT - C-FLO (48EA/CA)

## (undated) DEVICE — NEPTUNE 4 PORT MANIFOLD - (20/PK)